# Patient Record
Sex: MALE | Race: OTHER | HISPANIC OR LATINO | Employment: PART TIME | URBAN - METROPOLITAN AREA
[De-identification: names, ages, dates, MRNs, and addresses within clinical notes are randomized per-mention and may not be internally consistent; named-entity substitution may affect disease eponyms.]

---

## 2022-07-29 ENCOUNTER — HOSPITAL ENCOUNTER (OUTPATIENT)
Facility: HOSPITAL | Age: 32
Discharge: HOME | End: 2022-08-02
Attending: STUDENT IN AN ORGANIZED HEALTH CARE EDUCATION/TRAINING PROGRAM | Admitting: STUDENT IN AN ORGANIZED HEALTH CARE EDUCATION/TRAINING PROGRAM

## 2022-07-29 DIAGNOSIS — R07.9 CHEST PAIN: ICD-10-CM

## 2022-07-29 DIAGNOSIS — L03.319 CELLULITIS OF SUPRAPUBIC REGION: Primary | ICD-10-CM

## 2022-07-29 DIAGNOSIS — R06.02 SOB (SHORTNESS OF BREATH): ICD-10-CM

## 2022-07-29 DIAGNOSIS — E66.2 OBESITY HYPOVENTILATION SYNDROME: ICD-10-CM

## 2022-07-29 DIAGNOSIS — J96.02 ACUTE HYPERCAPNIC RESPIRATORY FAILURE: ICD-10-CM

## 2022-07-29 LAB
ALBUMIN SERPL-MCNC: 3.1 GM/DL (ref 3.5–5)
ALBUMIN/GLOB SERPL: 0.9 RATIO (ref 1.1–2)
ALP SERPL-CCNC: 108 UNIT/L (ref 40–150)
ALT SERPL-CCNC: 50 UNIT/L (ref 0–55)
APPEARANCE UR: CLEAR
AST SERPL-CCNC: 27 UNIT/L (ref 5–34)
BACTERIA #/AREA URNS AUTO: ABNORMAL /HPF
BASOPHILS # BLD AUTO: 0.03 X10(3)/MCL (ref 0–0.2)
BASOPHILS NFR BLD AUTO: 0.4 %
BILIRUB UR QL STRIP.AUTO: NEGATIVE MG/DL
BILIRUBIN DIRECT+TOT PNL SERPL-MCNC: 0.8 MG/DL
BUN SERPL-MCNC: 20.5 MG/DL (ref 8.9–20.6)
CALCIUM SERPL-MCNC: 8.6 MG/DL (ref 8.4–10.2)
CHLORIDE SERPL-SCNC: 100 MMOL/L (ref 98–107)
CO2 SERPL-SCNC: 35 MMOL/L (ref 22–29)
COLOR UR AUTO: ABNORMAL
CREAT SERPL-MCNC: 0.9 MG/DL (ref 0.73–1.18)
CRP SERPL-MCNC: 13.3 MG/L
EOSINOPHIL # BLD AUTO: 0.12 X10(3)/MCL (ref 0–0.9)
EOSINOPHIL NFR BLD AUTO: 1.5 %
ERYTHROCYTE [DISTWIDTH] IN BLOOD BY AUTOMATED COUNT: 16.2 % (ref 11.5–17)
GLOBULIN SER-MCNC: 3.6 GM/DL (ref 2.4–3.5)
GLUCOSE SERPL-MCNC: 86 MG/DL (ref 74–100)
GLUCOSE UR QL STRIP.AUTO: NORMAL MG/DL
HCT VFR BLD AUTO: 54.4 % (ref 42–52)
HGB BLD-MCNC: 15.6 GM/DL (ref 14–18)
HYALINE CASTS #/AREA URNS LPF: ABNORMAL /LPF
IMM GRANULOCYTES # BLD AUTO: 0.02 X10(3)/MCL (ref 0–0.04)
IMM GRANULOCYTES NFR BLD AUTO: 0.3 %
KETONES UR QL STRIP.AUTO: NEGATIVE MG/DL
LACTATE SERPL-SCNC: 1.4 MMOL/L (ref 0.5–2.2)
LEUKOCYTE ESTERASE UR QL STRIP.AUTO: NEGATIVE UNIT/L
LYMPHOCYTES # BLD AUTO: 1.23 X10(3)/MCL (ref 0.6–4.6)
LYMPHOCYTES NFR BLD AUTO: 15.9 %
MCH RBC QN AUTO: 26.4 PG (ref 27–31)
MCHC RBC AUTO-ENTMCNC: 28.7 MG/DL (ref 33–36)
MCV RBC AUTO: 91.9 FL (ref 80–94)
MONOCYTES # BLD AUTO: 1.1 X10(3)/MCL (ref 0.1–1.3)
MONOCYTES NFR BLD AUTO: 14.2 %
NEUTROPHILS # BLD AUTO: 5.3 X10(3)/MCL (ref 2.1–9.2)
NEUTROPHILS NFR BLD AUTO: 67.7 %
NITRITE UR QL STRIP.AUTO: NEGATIVE
NRBC BLD AUTO-RTO: 0.5 %
PH UR STRIP.AUTO: 6 [PH]
PLATELET # BLD AUTO: 228 X10(3)/MCL (ref 130–400)
PMV BLD AUTO: 10.5 FL (ref 7.4–10.4)
POTASSIUM SERPL-SCNC: 4.4 MMOL/L (ref 3.5–5.1)
PROT SERPL-MCNC: 6.7 GM/DL (ref 6.4–8.3)
PROT UR QL STRIP.AUTO: ABNORMAL MG/DL
RBC # BLD AUTO: 5.92 X10(6)/MCL (ref 4.7–6.1)
RBC #/AREA URNS AUTO: ABNORMAL /HPF
RBC UR QL AUTO: NEGATIVE UNIT/L
SODIUM SERPL-SCNC: 142 MMOL/L (ref 136–145)
SP GR UR STRIP.AUTO: 1.03
SQUAMOUS #/AREA URNS LPF: ABNORMAL /HPF
UROBILINOGEN UR STRIP-ACNC: NORMAL MG/DL
WBC # SPEC AUTO: 7.8 X10(3)/MCL (ref 4.5–11.5)
WBC #/AREA URNS AUTO: ABNORMAL /HPF

## 2022-07-29 PROCEDURE — 25500020 PHARM REV CODE 255

## 2022-07-29 PROCEDURE — 87591 N.GONORRHOEAE DNA AMP PROB: CPT | Performed by: PHYSICIAN ASSISTANT

## 2022-07-29 PROCEDURE — 36415 COLL VENOUS BLD VENIPUNCTURE: CPT | Performed by: PHYSICIAN ASSISTANT

## 2022-07-29 PROCEDURE — 96365 THER/PROPH/DIAG IV INF INIT: CPT

## 2022-07-29 PROCEDURE — 80053 COMPREHEN METABOLIC PANEL: CPT | Performed by: PHYSICIAN ASSISTANT

## 2022-07-29 PROCEDURE — 99285 EMERGENCY DEPT VISIT HI MDM: CPT | Mod: 25

## 2022-07-29 PROCEDURE — 25000003 PHARM REV CODE 250: Performed by: PHYSICIAN ASSISTANT

## 2022-07-29 PROCEDURE — 83605 ASSAY OF LACTIC ACID: CPT | Performed by: PHYSICIAN ASSISTANT

## 2022-07-29 PROCEDURE — 81001 URINALYSIS AUTO W/SCOPE: CPT | Performed by: PHYSICIAN ASSISTANT

## 2022-07-29 PROCEDURE — 87040 BLOOD CULTURE FOR BACTERIA: CPT | Performed by: PHYSICIAN ASSISTANT

## 2022-07-29 PROCEDURE — 63600175 PHARM REV CODE 636 W HCPCS: Performed by: PHYSICIAN ASSISTANT

## 2022-07-29 PROCEDURE — 96361 HYDRATE IV INFUSION ADD-ON: CPT

## 2022-07-29 PROCEDURE — 87491 CHLMYD TRACH DNA AMP PROBE: CPT | Performed by: PHYSICIAN ASSISTANT

## 2022-07-29 PROCEDURE — 85025 COMPLETE CBC W/AUTO DIFF WBC: CPT | Performed by: PHYSICIAN ASSISTANT

## 2022-07-29 PROCEDURE — 86140 C-REACTIVE PROTEIN: CPT | Performed by: PHYSICIAN ASSISTANT

## 2022-07-29 RX ORDER — CLINDAMYCIN PHOSPHATE 600 MG/50ML
600 INJECTION, SOLUTION INTRAVENOUS
Status: COMPLETED | OUTPATIENT
Start: 2022-07-29 | End: 2022-07-29

## 2022-07-29 RX ADMIN — IOPAMIDOL 100 ML: 755 INJECTION, SOLUTION INTRAVENOUS at 09:07

## 2022-07-29 RX ADMIN — CLINDAMYCIN PHOSPHATE 600 MG: 600 INJECTION, SOLUTION INTRAVENOUS at 11:07

## 2022-07-29 RX ADMIN — SODIUM CHLORIDE, POTASSIUM CHLORIDE, SODIUM LACTATE AND CALCIUM CHLORIDE 1000 ML: 600; 310; 30; 20 INJECTION, SOLUTION INTRAVENOUS at 09:07

## 2022-07-30 LAB
ALBUMIN SERPL-MCNC: 3 GM/DL (ref 3.5–5)
ALBUMIN/GLOB SERPL: 0.8 RATIO (ref 1.1–2)
ALP SERPL-CCNC: 111 UNIT/L (ref 40–150)
ALT SERPL-CCNC: 51 UNIT/L (ref 0–55)
AMPHET UR QL SCN: NEGATIVE
AST SERPL-CCNC: 24 UNIT/L (ref 5–34)
BARBITURATE SCN PRESENT UR: NEGATIVE
BASOPHILS # BLD AUTO: 0.02 X10(3)/MCL (ref 0–0.2)
BASOPHILS NFR BLD AUTO: 0.3 %
BENZODIAZ UR QL SCN: NEGATIVE
BILIRUBIN DIRECT+TOT PNL SERPL-MCNC: 0.5 MG/DL
BNP BLD-MCNC: 378.4 PG/ML
BUN SERPL-MCNC: 18.9 MG/DL (ref 8.9–20.6)
C TRACH DNA SPEC QL NAA+PROBE: NOT DETECTED
CALCIUM SERPL-MCNC: 8.4 MG/DL (ref 8.4–10.2)
CANNABINOIDS UR QL SCN: NEGATIVE
CHLORIDE SERPL-SCNC: 102 MMOL/L (ref 98–107)
CHOLEST SERPL-MCNC: 83 MG/DL
CHOLEST/HDLC SERPL: 6 {RATIO} (ref 0–5)
CO2 SERPL-SCNC: 32 MMOL/L (ref 22–29)
COCAINE UR QL SCN: NEGATIVE
CORRECTED TEMPERATURE (PCO2): 80 MMHG (ref 20–50)
CORRECTED TEMPERATURE (PCO2): 85 MMHG (ref 20–50)
CORRECTED TEMPERATURE (PH): 7.23 (ref 7.3–7.6)
CORRECTED TEMPERATURE (PH): 7.26 (ref 7.3–7.6)
CORRECTED TEMPERATURE (PO2): 246 MMHG (ref 75–100)
CORRECTED TEMPERATURE (PO2): 67 MMHG (ref 75–100)
CREAT SERPL-MCNC: 0.75 MG/DL (ref 0.73–1.18)
D DIMER PPP IA.FEU-MCNC: 1.25 UG/ML FEU (ref 0–0.5)
EOSINOPHIL # BLD AUTO: 0.13 X10(3)/MCL (ref 0–0.9)
EOSINOPHIL NFR BLD AUTO: 1.9 %
ERYTHROCYTE [DISTWIDTH] IN BLOOD BY AUTOMATED COUNT: 16.3 % (ref 11.5–17)
ERYTHROCYTE [SEDIMENTATION RATE] IN BLOOD: 10 MM/HR (ref 0–15)
EST. AVERAGE GLUCOSE BLD GHB EST-MCNC: 142.7 MG/DL
FENTANYL UR QL SCN: NEGATIVE
GLOBULIN SER-MCNC: 3.6 GM/DL (ref 2.4–3.5)
GLUCOSE SERPL-MCNC: 102 MG/DL (ref 74–100)
HBA1C MFR BLD: 6.6 %
HCO3 UR-SCNC: 35.6 MMOL/L (ref 22–26)
HCO3 UR-SCNC: 35.9 MMOL/L (ref 22–26)
HCT VFR BLD AUTO: 54.8 % (ref 42–52)
HDLC SERPL-MCNC: 15 MG/DL (ref 35–60)
HGB BLD-MCNC: 15.7 G/DL (ref 12–18)
HGB BLD-MCNC: 16 G/DL (ref 12–18)
HGB BLD-MCNC: 16.1 GM/DL (ref 14–18)
HIV 1+2 AB+HIV1 P24 AG SERPL QL IA: NONREACTIVE
IMM GRANULOCYTES # BLD AUTO: 0.02 X10(3)/MCL (ref 0–0.04)
IMM GRANULOCYTES NFR BLD AUTO: 0.3 %
LDLC SERPL CALC-MCNC: 47 MG/DL (ref 50–140)
LYMPHOCYTES # BLD AUTO: 1.05 X10(3)/MCL (ref 0.6–4.6)
LYMPHOCYTES NFR BLD AUTO: 15.1 %
MAGNESIUM SERPL-MCNC: 1.6 MG/DL (ref 1.6–2.6)
MCH RBC QN AUTO: 27 PG (ref 27–31)
MCHC RBC AUTO-ENTMCNC: 29.4 MG/DL (ref 33–36)
MCV RBC AUTO: 91.9 FL (ref 80–94)
MDMA UR QL SCN: NEGATIVE
MONOCYTES # BLD AUTO: 0.92 X10(3)/MCL (ref 0.1–1.3)
MONOCYTES NFR BLD AUTO: 13.2 %
N GONORRHOEA DNA SPEC QL NAA+PROBE: NOT DETECTED
NEUTROPHILS # BLD AUTO: 4.8 X10(3)/MCL (ref 2.1–9.2)
NEUTROPHILS NFR BLD AUTO: 69.2 %
NRBC BLD AUTO-RTO: 0.3 %
OPIATES UR QL SCN: NEGATIVE
PCO2 BLDA: 80 MMHG (ref 20–50)
PCO2 BLDA: 85 MMHG (ref 20–50)
PCP UR QL: NEGATIVE
PH SMN: 7.23 [PH] (ref 7.3–7.6)
PH SMN: 7.26 [PH] (ref 7.3–7.6)
PH UR: 5.5 [PH] (ref 3–11)
PHOSPHATE SERPL-MCNC: 3.9 MG/DL (ref 2.3–4.7)
PLATELET # BLD AUTO: 220 X10(3)/MCL (ref 130–400)
PMV BLD AUTO: 10.9 FL (ref 7.4–10.4)
PO2 BLDA: 246 MMHG (ref 75–100)
PO2 BLDA: 67 MMHG (ref 75–100)
POC BASE DEFICIT: 4.4 MMOL/L (ref -2–2)
POC BASE DEFICIT: 5.3 MMOL/L (ref -2–2)
POC COHB: 1.6 % (ref 0.5–1.5)
POC COHB: 2 % (ref 0.5–1.5)
POC METHB: 0.5 % (ref 0–1.5)
POC METHB: 0.7 % (ref 0–1.5)
POC O2HB: 88.5 % (ref 94–100)
POC O2HB: 96.7 % (ref 94–100)
POC PERFORMED BY: ABNORMAL
POC PERFORMED BY: ABNORMAL
POC SATURATED O2: 89.8 % (ref 90–100)
POC SATURATED O2: 99.8 % (ref 90–100)
POC TEMPERATURE: 37 C
POC TEMPERATURE: 37 C
POCT GLUCOSE: 111 MG/DL (ref 70–110)
POCT GLUCOSE: 115 MG/DL (ref 70–110)
POCT GLUCOSE: 120 MG/DL (ref 70–110)
POTASSIUM SERPL-SCNC: 4.5 MMOL/L (ref 3.5–5.1)
PROT SERPL-MCNC: 6.6 GM/DL (ref 6.4–8.3)
RBC # BLD AUTO: 5.96 X10(6)/MCL (ref 4.7–6.1)
SARS-COV-2 RDRP RESP QL NAA+PROBE: NEGATIVE
SODIUM SERPL-SCNC: 141 MMOL/L (ref 136–145)
SPECIFIC GRAVITY, URINE AUTO (.000) (OHS): 1.02 (ref 1–1.03)
SPECIMEN SOURCE: ABNORMAL
SPECIMEN SOURCE: ABNORMAL
T PALLIDUM AB SER QL: NONREACTIVE
TRIGL SERPL-MCNC: 103 MG/DL (ref 34–140)
TSH SERPL-ACNC: 3.27 UIU/ML (ref 0.35–4.94)
VANCOMYCIN SERPL-MCNC: 15.6 UG/ML (ref 15–20)
VANCOMYCIN TROUGH SERPL-MCNC: <1.4 UG/ML (ref 15–20)
VLDLC SERPL CALC-MCNC: 21 MG/DL
WBC # SPEC AUTO: 7 X10(3)/MCL (ref 4.5–11.5)

## 2022-07-30 PROCEDURE — 83036 HEMOGLOBIN GLYCOSYLATED A1C: CPT

## 2022-07-30 PROCEDURE — 80202 ASSAY OF VANCOMYCIN: CPT | Performed by: STUDENT IN AN ORGANIZED HEALTH CARE EDUCATION/TRAINING PROGRAM

## 2022-07-30 PROCEDURE — G0378 HOSPITAL OBSERVATION PER HR: HCPCS

## 2022-07-30 PROCEDURE — 80061 LIPID PANEL: CPT

## 2022-07-30 PROCEDURE — 86780 TREPONEMA PALLIDUM: CPT

## 2022-07-30 PROCEDURE — 87635 SARS-COV-2 COVID-19 AMP PRB: CPT | Performed by: PHYSICIAN ASSISTANT

## 2022-07-30 PROCEDURE — 27000190 HC CPAP FULL FACE MASK W/VALVE

## 2022-07-30 PROCEDURE — 80307 DRUG TEST PRSMV CHEM ANLYZR: CPT

## 2022-07-30 PROCEDURE — 87389 HIV-1 AG W/HIV-1&-2 AB AG IA: CPT

## 2022-07-30 PROCEDURE — 83735 ASSAY OF MAGNESIUM: CPT

## 2022-07-30 PROCEDURE — 96368 THER/DIAG CONCURRENT INF: CPT

## 2022-07-30 PROCEDURE — 80202 ASSAY OF VANCOMYCIN: CPT

## 2022-07-30 PROCEDURE — 99900035 HC TECH TIME PER 15 MIN (STAT)

## 2022-07-30 PROCEDURE — 82803 BLOOD GASES ANY COMBINATION: CPT

## 2022-07-30 PROCEDURE — 96366 THER/PROPH/DIAG IV INF ADDON: CPT

## 2022-07-30 PROCEDURE — 84443 ASSAY THYROID STIM HORMONE: CPT

## 2022-07-30 PROCEDURE — 63600175 PHARM REV CODE 636 W HCPCS

## 2022-07-30 PROCEDURE — 25500020 PHARM REV CODE 255

## 2022-07-30 PROCEDURE — 36415 COLL VENOUS BLD VENIPUNCTURE: CPT

## 2022-07-30 PROCEDURE — 25000003 PHARM REV CODE 250

## 2022-07-30 PROCEDURE — 94660 CPAP INITIATION&MGMT: CPT

## 2022-07-30 PROCEDURE — 36600 WITHDRAWAL OF ARTERIAL BLOOD: CPT

## 2022-07-30 PROCEDURE — 63600175 PHARM REV CODE 636 W HCPCS: Performed by: STUDENT IN AN ORGANIZED HEALTH CARE EDUCATION/TRAINING PROGRAM

## 2022-07-30 PROCEDURE — 25000003 PHARM REV CODE 250: Performed by: STUDENT IN AN ORGANIZED HEALTH CARE EDUCATION/TRAINING PROGRAM

## 2022-07-30 PROCEDURE — 83880 ASSAY OF NATRIURETIC PEPTIDE: CPT

## 2022-07-30 PROCEDURE — 27000221 HC OXYGEN, UP TO 24 HOURS

## 2022-07-30 PROCEDURE — 93005 ELECTROCARDIOGRAM TRACING: CPT

## 2022-07-30 PROCEDURE — 85025 COMPLETE CBC W/AUTO DIFF WBC: CPT

## 2022-07-30 PROCEDURE — 96367 TX/PROPH/DG ADDL SEQ IV INF: CPT

## 2022-07-30 PROCEDURE — 85651 RBC SED RATE NONAUTOMATED: CPT

## 2022-07-30 PROCEDURE — 96372 THER/PROPH/DIAG INJ SC/IM: CPT

## 2022-07-30 PROCEDURE — 94761 N-INVAS EAR/PLS OXIMETRY MLT: CPT

## 2022-07-30 PROCEDURE — 84100 ASSAY OF PHOSPHORUS: CPT

## 2022-07-30 PROCEDURE — 85379 FIBRIN DEGRADATION QUANT: CPT

## 2022-07-30 PROCEDURE — 80053 COMPREHEN METABOLIC PANEL: CPT

## 2022-07-30 RX ORDER — VANCOMYCIN 1.75 GRAM/500 ML IN 0.9 % SODIUM CHLORIDE INTRAVENOUS
1750
Status: DISCONTINUED | OUTPATIENT
Start: 2022-07-30 | End: 2022-07-31

## 2022-07-30 RX ORDER — GLUCAGON 1 MG
1 KIT INJECTION
Status: DISCONTINUED | OUTPATIENT
Start: 2022-07-30 | End: 2022-07-30

## 2022-07-30 RX ORDER — IBUPROFEN 200 MG
24 TABLET ORAL
Status: DISCONTINUED | OUTPATIENT
Start: 2022-07-30 | End: 2022-08-02 | Stop reason: HOSPADM

## 2022-07-30 RX ORDER — ENOXAPARIN SODIUM 60 MG/.6ML
1 INJECTION SUBCUTANEOUS
Status: DISCONTINUED | OUTPATIENT
Start: 2022-07-30 | End: 2022-07-30

## 2022-07-30 RX ORDER — IBUPROFEN 200 MG
16 TABLET ORAL
Status: DISCONTINUED | OUTPATIENT
Start: 2022-07-30 | End: 2022-07-30

## 2022-07-30 RX ORDER — SODIUM CHLORIDE 0.9 % (FLUSH) 0.9 %
10 SYRINGE (ML) INJECTION EVERY 12 HOURS PRN
Status: DISCONTINUED | OUTPATIENT
Start: 2022-07-30 | End: 2022-08-02 | Stop reason: HOSPADM

## 2022-07-30 RX ORDER — INSULIN ASPART 100 [IU]/ML
0-5 INJECTION, SOLUTION INTRAVENOUS; SUBCUTANEOUS
Status: DISCONTINUED | OUTPATIENT
Start: 2022-07-30 | End: 2022-08-02 | Stop reason: HOSPADM

## 2022-07-30 RX ORDER — PANTOPRAZOLE SODIUM 40 MG/1
40 TABLET, DELAYED RELEASE ORAL DAILY
Status: DISCONTINUED | OUTPATIENT
Start: 2022-07-30 | End: 2022-08-02 | Stop reason: HOSPADM

## 2022-07-30 RX ORDER — IBUPROFEN 200 MG
16 TABLET ORAL
Status: DISCONTINUED | OUTPATIENT
Start: 2022-07-30 | End: 2022-08-02 | Stop reason: HOSPADM

## 2022-07-30 RX ORDER — GLUCAGON 1 MG
1 KIT INJECTION
Status: DISCONTINUED | OUTPATIENT
Start: 2022-07-30 | End: 2022-08-02 | Stop reason: HOSPADM

## 2022-07-30 RX ORDER — ENOXAPARIN SODIUM 100 MG/ML
40 INJECTION SUBCUTANEOUS EVERY 12 HOURS
Status: DISCONTINUED | OUTPATIENT
Start: 2022-07-30 | End: 2022-07-30

## 2022-07-30 RX ORDER — IBUPROFEN 200 MG
24 TABLET ORAL
Status: DISCONTINUED | OUTPATIENT
Start: 2022-07-30 | End: 2022-07-30

## 2022-07-30 RX ORDER — ENOXAPARIN SODIUM 150 MG/ML
150 INJECTION SUBCUTANEOUS
Status: DISCONTINUED | OUTPATIENT
Start: 2022-07-30 | End: 2022-07-31

## 2022-07-30 RX ORDER — LEVOFLOXACIN 5 MG/ML
750 INJECTION, SOLUTION INTRAVENOUS
Status: DISCONTINUED | OUTPATIENT
Start: 2022-07-30 | End: 2022-08-01

## 2022-07-30 RX ORDER — MAGNESIUM SULFATE HEPTAHYDRATE 40 MG/ML
2 INJECTION, SOLUTION INTRAVENOUS ONCE
Status: COMPLETED | OUTPATIENT
Start: 2022-07-30 | End: 2022-07-30

## 2022-07-30 RX ORDER — NALOXONE HCL 0.4 MG/ML
0.02 VIAL (ML) INJECTION
Status: DISCONTINUED | OUTPATIENT
Start: 2022-07-30 | End: 2022-08-02 | Stop reason: HOSPADM

## 2022-07-30 RX ADMIN — MAGNESIUM SULFATE 2 G: 2 INJECTION INTRAVENOUS at 04:07

## 2022-07-30 RX ADMIN — IOPAMIDOL 100 ML: 755 INJECTION, SOLUTION INTRAVENOUS at 11:07

## 2022-07-30 RX ADMIN — VANCOMYCIN HYDROCHLORIDE 2000 MG: 1 INJECTION, POWDER, LYOPHILIZED, FOR SOLUTION INTRAVENOUS at 02:07

## 2022-07-30 RX ADMIN — VANCOMYCIN 1.75 GRAM/500 ML IN 0.9 % SODIUM CHLORIDE INTRAVENOUS 1750 MG: at 01:07

## 2022-07-30 RX ADMIN — VANCOMYCIN 1.75 GRAM/500 ML IN 0.9 % SODIUM CHLORIDE INTRAVENOUS 1750 MG: at 10:07

## 2022-07-30 RX ADMIN — ENOXAPARIN SODIUM 150 MG: 150 INJECTION SUBCUTANEOUS at 07:07

## 2022-07-30 RX ADMIN — LEVOFLOXACIN 750 MG: 750 INJECTION, SOLUTION INTRAVENOUS at 03:07

## 2022-07-30 RX ADMIN — PANTOPRAZOLE SODIUM 40 MG: 40 TABLET, DELAYED RELEASE ORAL at 09:07

## 2022-07-30 RX ADMIN — ENOXAPARIN SODIUM 150 MG: 150 INJECTION SUBCUTANEOUS at 06:07

## 2022-07-30 NOTE — PT/OT/SLP PROGRESS
Physical Therapy      Patient Name:  Tawanda Izaguirre   MRN:  [id number]    Patient not seen today secondary to Testing/imaging (xray/CT/MRI), Other (Comment) (Pt awaiting CTE - PE protocol test/results, will re-attempt eval when results available and pt appropriate.  Will check pt status tomorrow.). Will follow-up 7/31/22.

## 2022-07-30 NOTE — PLAN OF CARE
Problem: Adult Inpatient Plan of Care  Goal: Plan of Care Review  Outcome: Ongoing, Progressing  Goal: Patient-Specific Goal (Individualized)  Outcome: Ongoing, Progressing  Goal: Absence of Hospital-Acquired Illness or Injury  Outcome: Ongoing, Progressing  Goal: Optimal Comfort and Wellbeing  Outcome: Ongoing, Progressing  Goal: Readiness for Transition of Care  Outcome: Ongoing, Progressing     Problem: Bariatric Environmental Safety  Goal: Safety Maintained with Care  Outcome: Ongoing, Progressing     Problem: Infection  Goal: Absence of Infection Signs and Symptoms  Outcome: Ongoing, Progressing     Problem: Pain Acute  Goal: Acceptable Pain Control and Functional Ability  Outcome: Ongoing, Progressing

## 2022-07-30 NOTE — ED PROVIDER NOTES
Encounter Date: 7/29/2022       History     Chief Complaint   Patient presents with    Dysuria    Pelvic Pain     Pt arrived to ED after going to urgent care. Pt brought papers from urgent care stating he needs IV abx for possible pelvic cellulitis. Pt does require . Pt states his penis is swollen and red x2 days. Pt states he has had trouble urinating and painful urination since this AM. Pt states pain 4/10     Patient is a 32 year old [place]z speaking male who was sent to ED from urgent care for IV antibiotics for possible pelvic cellulitis of skin.  He states it burns whenever he urinates and he thinks his penis is infected due to swelling and redness x 4 days.  He denies fever, chills, nausea, vomiting.  He also states that his doctor mentioned that he needs oxygen.      The history is provided by the patient. A  was used.     Review of patient's allergies indicates:  No Known Allergies  History reviewed. No pertinent past medical history.  History reviewed. No pertinent surgical history.  History reviewed. No pertinent family history.     Review of Systems   Constitutional: Negative for chills and fever.   Respiratory: Negative for cough and chest tightness.    Cardiovascular: Negative for chest pain and palpitations.   Gastrointestinal: Positive for abdominal pain (lower abdomen/suprapubic swelling, pain and redness consistent with cellulitis ). Negative for nausea and vomiting.   Genitourinary: Positive for dysuria and penile pain (swollen, red).   Neurological: Negative for dizziness, numbness and headaches.   Hematological: Negative.        Physical Exam     Initial Vitals [07/29/22 1955]   BP Pulse Resp Temp SpO2   136/83 86 18 99.1 °F (37.3 °C) 95 %      MAP       --         Physical Exam    Nursing note and vitals reviewed.  Constitutional: He is Obese .   HENT:   Nose: Nose normal.   Mouth/Throat: Oropharynx is clear and moist.   Eyes: Conjunctivae are normal.    Cardiovascular: Normal rate, normal heart sounds and intact distal pulses.   Pulmonary/Chest: Breath sounds normal.   Abdominal: Abdomen is soft. Bowel sounds are normal.   Cellulitis in lower abdomen, suprapubic area into bi groin.  Tenderness, erythema, swelling     Neurological: He is alert.   Skin: Skin is warm. Capillary refill takes less than 2 seconds.         ED Course   Procedures  Labs Reviewed   COMPREHENSIVE METABOLIC PANEL - Abnormal; Notable for the following components:       Result Value    Carbon Dioxide 35 (*)     Albumin Level 3.1 (*)     Globulin 3.6 (*)     Albumin/Globulin Ratio 0.9 (*)     All other components within normal limits   C-REACTIVE PROTEIN - Abnormal; Notable for the following components:    C-Reactive Protein 13.30 (*)     All other components within normal limits   URINALYSIS, REFLEX TO URINE CULTURE - Abnormal; Notable for the following components:    Color, UA Light-Yellow (*)     Protein, UA Trace (*)     All other components within normal limits   CBC WITH DIFFERENTIAL - Abnormal; Notable for the following components:    Hct 54.4 (*)     MCH 26.4 (*)     MCHC 28.7 (*)     MPV 10.5 (*)     All other components within normal limits   LACTIC ACID, PLASMA - Normal   CHLAMYDIA/GONORRHOEAE(GC), PCR   BLOOD CULTURE [facility]   BLOOD CULTURE [facility]   CBC W/ AUTO DIFFERENTIAL    Narrative:     The following orders were created for panel order CBC Auto Differential.  Procedure                               Abnormality         Status                     ---------                               -----------         ------                     CBC with Differential[[id number]]        Abnormal            Final result                 Please view results for these tests on the individual orders.   EXTRA TUBES    Narrative:     The following orders were created for panel order EXTRA TUBES.  Procedure                               Abnormality         Status                     ---------                                -----------         ------                     Light Blue Top Hold[[id number]]                              In process                 Red Top Hold[[id number]]                                     In process                   Please view results for these tests on the individual orders.   LIGHT BLUE TOP HOLD   RED TOP HOLD   EXTRA TUBES    Narrative:     The following orders were created for panel order EXTRA TUBES.  Procedure                               Abnormality         Status                     ---------                               -----------         ------                     Light Green Top Hold[[id number]]                             In process                   Please view results for these tests on the individual orders.   LIGHT GREEN TOP HOLD   SARS-COV-2 RNA AMPLIFICATION, QUAL          Imaging Results          CT Abdomen Pelvis With Contrast (Preliminary result)  Result time 07/29/22 21:46:46    Preliminary result by Interface, Rad Results In (07/29/22 21:46:46)                 Narrative:    START OF REPORT:  Technique: CT of the abdomen and pelvis was performed with axial images as well as sagittal and coronal reconstruction images with intravenous contrast but without oral contrast.    Comparison: None available.    Clinical History: Cellulitis, lower abdomen, genital area.    Dosage Information: Automated Exposure Control was utilized 1436.38 mGy.cm.    Findings:  Lines and Tubes: None.  Thorax:  Lungs: The visualized lung bases appear unremarkable.  Pleura: No effusions or thickening.  Heart: The heart size is within normal limits.  Abdomen:  Abdominal Wall: There is moderate diffuse subcutaneous fat stranding along the ventral mid and lower abdominal wall with associated mild cutaneous thickening. This may reflect cellulitis. No discrete fluid collection or abscess is identified.  Liver: The liver appears unremarkable.  Biliary System: No extrahepatic biliary duct dilatation is  seen.  Gallbladder: The gallbladder is non-distended and appears otherwise unremarkable.  Pancreas: The pancreas appears unremarkable.  Spleen: The spleen appears unremarkable.  Adrenals: The adrenal glands appear unremarkable.  Kidneys: The kidneys appear unremarkable with no stones cysts masses or hydronephrosis.  Aorta: The abdominal aorta appears unremarkable.  IVC: Unremarkable.  Bowel:  Esophagus: The visualized esophagus appears unremarkable.  Stomach: The stomach appears unremarkable.  Duodenum: Unremarkable appearing duodenum.  Small Bowel: The small bowel appears unremarkable.  Colon: Nondistended.  Appendix: The appendix appears unremarkable (series 2 image 61-71).  Peritoneum: No intraperitoneal free air or ascites is seen.    Pelvis:  Bladder: The bladder appears unremarkable.  Male:  Prostate gland: The prostate gland appears unremarkable.  Inguinal Findings: Multiple prominent inguinal lymph nodes are seen bilaterally. This is consistent with reactive lymphadenopathy.  Inguinal Hernia: Incidental note is made of small uncomplicated mesenteric fat containing right inguinal hernia.    Bony structures:  Dorsal Spine: The visualized dorsal spine appears unremarkable.      Impression:  1. There is moderate diffuse subcutaneous fat stranding along the ventral mid and lower abdominal wall with associated mild cutaneous thickening. This may reflect cellulitis. No discrete fluid collection or abscess is identified.  2. Multiple prominent inguinal lymph nodes are seen bilaterally. This is consistent with reactive lymphadenopathy.  3. No acute intraabdominal or pelvic solid organ or bowel pathology identified. Details and other findings as discussed above.                      Preliminary result by Drew Calabrese MD (07/29/22 21:46:46)                 Narrative:    START OF REPORT:  Technique: CT of the abdomen and pelvis was performed with axial images as well as sagittal and coronal reconstruction images with  intravenous contrast but without oral contrast.    Comparison: None available.    Clinical History: Cellulitis, lower abdomen, genital area.    Dosage Information: Automated Exposure Control was utilized 1436.38 mGy.cm.    Findings:  Lines and Tubes: None.  Thorax:  Lungs: The visualized lung bases appear unremarkable.  Pleura: No effusions or thickening.  Heart: The heart size is within normal limits.  Abdomen:  Abdominal Wall: There is moderate diffuse subcutaneous fat stranding along the ventral mid and lower abdominal wall with associated mild cutaneous thickening. This may reflect cellulitis. No discrete fluid collection or abscess is identified.  Liver: The liver appears unremarkable.  Biliary System: No extrahepatic biliary duct dilatation is seen.  Gallbladder: The gallbladder is non-distended and appears otherwise unremarkable.  Pancreas: The pancreas appears unremarkable.  Spleen: The spleen appears unremarkable.  Adrenals: The adrenal glands appear unremarkable.  Kidneys: The kidneys appear unremarkable with no stones cysts masses or hydronephrosis.  Aorta: The abdominal aorta appears unremarkable.  IVC: Unremarkable.  Bowel:  Esophagus: The visualized esophagus appears unremarkable.  Stomach: The stomach appears unremarkable.  Duodenum: Unremarkable appearing duodenum.  Small Bowel: The small bowel appears unremarkable.  Colon: Nondistended.  Appendix: The appendix appears unremarkable (series 2 image 61-71).  Peritoneum: No intraperitoneal free air or ascites is seen.    Pelvis:  Bladder: The bladder appears unremarkable.  Male:  Prostate gland: The prostate gland appears unremarkable.  Inguinal Findings: Multiple prominent inguinal lymph nodes are seen bilaterally. This is consistent with reactive lymphadenopathy.  Inguinal Hernia: Incidental note is made of small uncomplicated mesenteric fat containing right inguinal hernia.    Bony structures:  Dorsal Spine: The visualized dorsal spine appears  unremarkable.      Impression:  1. There is moderate diffuse subcutaneous fat stranding along the ventral mid and lower abdominal wall with associated mild cutaneous thickening. This may reflect cellulitis. No discrete fluid collection or abscess is identified.  2. Multiple prominent inguinal lymph nodes are seen bilaterally. This is consistent with reactive lymphadenopathy.  3. No acute intraabdominal or pelvic solid organ or bowel pathology identified. Details and other findings as discussed above.                                   Medications   lactated ringers bolus 1,000 mL (0 mLs Intravenous Stopped 7/29/22 2204)   iopamidoL (ISOVUE-370) 76 % injection (100 mLs Intravenous Given 7/29/22 2115)   clindamycin in D5W 600 mg/50 mL IVPB 600 mg (600 mg Intravenous New Bag 7/29/22 2300)     Medical Decision Making:   Clinical Tests:   Lab Tests: Ordered and Reviewed  Radiological Study: Reviewed and Ordered  Other:   I have discussed this case with another health care provider.       <> Summary of the Discussion: I consulted Dr. Gipson for a bed for face to face evaluation of patient prior to admission             ED Course as of 07/30/22 0055 Fri Jul 29, 2022 2130 CRP(!): 13.30 [ER]   2130 CO2(!): 35 [ER]   2254 CT Abdomen Pelvis With Contrast  1. There is moderate diffuse subcutaneous fat stranding along the ventral mid and lower abdominal wall with associated mild cutaneous thickening. This may reflect cellulitis. No discrete fluid collection or abscess is identified.  2. Multiple prominent inguinal lymph nodes are seen bilaterally. This is consistent with reactive lymphadenopathy.  3. No acute intraabdominal or pelvic solid organ or bowel pathology identified. Details and other findings as discussed above.    [ER]   2333 Lactate, Damon: 1.4 [ER]      ED Course User Index  [ER] LIZETT Cochran             Clinical Impression:   Final diagnoses:  [L03.319] Cellulitis of suprapubic region (Primary)          ED  Disposition Condition    Admit               LIZETT Cochran  07/30/22 0057

## 2022-07-30 NOTE — H&P
"[facility] Medicine Wards   History & Physical Note     Resident Team: [facility] Medicine List 2  Attending Physician: Juan Miller MD  Resident: Raul Ramirez MD   Intern: Carley Zelaya MD     Date of Admit: 7/29/2022    Chief Complaint:     Dysuria and Pelvic Pain (Pt arrived to ED after going to urgent care. Pt brought papers from urgent care stating he needs IV abx for possible pelvic cellulitis. Pt does require . Pt states his penis is swollen and red x2 days. Pt states he has had trouble urinating and painful urination since this AM. Pt states pain 4/10)      Subjective:      History of Present Illness:  Tawanda Izaguirre is a 32 y.o. male who with no known PMH who presented to [facility] ED on 7/29/2022  with complaint of pelvic pain and dysuria.  Pt states he started to notice small blisters around his groin region aprox. 1 week ago.  The blisters started to spread up his abdomin and he began to notice swelling and redness about 4 days ago.  Over the course of that time he states the rash has continued to worsen and now feels very hot and swollen. He rates the pain as a 5/10 and denies any alleviating or aggravating factors. 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 The swelling has also been associated with problems urinating.  Pt states he has pain with urination and that he feels like "the urine isn't coming out as easily".  He states he has not been sexually active in 6 months and denies any penile discharge or lesions on his genital region. He denies any trauma to this region and states he has not been swimming recently.  He denies any F/C, N/V, chest pain, diarrhea, constipation, hematuria, abdominal pain, or lightheadedness.      Upon arrival to the ED, pt appeared to be short of breath and appeared to be satting in the 50s-60s on room air.  He was placed on 2L NC and his O2 sat " increased slightly.  He was then placed on BiPAP.  He has no history of oxygen requirements and does not use supplemental oxygen at home.  He is Covid negative. An ABG was ordered in the ED which showed evidence of chronic CO2 retention and respiratory acidosis consistent with obesity hypoventilation syndrome.     Pt states he has not been to the doctor since he was 11 years old.  He denies any past medical history and is not currently taking any medications.  Labs in the ED showed a normal WBC of 7.8 and the patient was afebrile on presentation. He had a stable H/H of 15.6/54.4 and his electrolytes were within normal limits.  His CRP was mildly elevated at 13.3 and UA was unremarkable.       Past Medical History:   has no past medical history on file.     Past Surgical History:   has no past surgical history on file.     Family History:  family history is not on file.     Social History:    Pt denies any history of cigerette use or use of tobacco products.  He states he used to drink beer a few days a week but quit drinking about 6 years ago.  He denies any other recreational drug use.     Allergies:  has No Known Allergies.     Home Medications:  Prior to Admission medications    Not on File     Review of Systems   Constitutional: Negative for chills and fever.   HENT: Negative for sore throat.    Eyes: Negative for blurred vision and double vision.   Respiratory: Positive for shortness of breath. Negative for cough and wheezing.    Cardiovascular: Positive for leg swelling. Negative for chest pain.   Gastrointestinal: Negative for abdominal pain, constipation, diarrhea, nausea and vomiting.   Genitourinary: Positive for dysuria and frequency. Negative for flank pain.   Musculoskeletal: Negative.    Skin: Positive for rash.   Neurological: Negative for dizziness, loss of consciousness, weakness and headaches.   Endo/Heme/Allergies: Negative.    Psychiatric/Behavioral: Negative.         Objective:     Vital Signs  (Most Recent):  Temp: 97.6 °F (36.4 °C) (07/30/22 0235)  Pulse: 66 (07/30/22 0235)  Resp: 20 (07/30/22 0235)  BP: 116/81 (07/30/22 0235)  SpO2: 100 % (07/30/22 0235) Vital Signs (24h Range):  Temp:  [97.6 °F (36.4 °C)-99.1 °F (37.3 °C)] 97.6 °F (36.4 °C)  Pulse:  [66-91] 66  Resp:  [18-20] 20  SpO2:  [52 %-100 %] 100 %  BP: (116-137)/(61-84) 116/81     Physical Exam  Vitals reviewed.   Constitutional:       General: He is not in acute distress.     Appearance: Normal appearance.   HENT:      Head: Normocephalic and atraumatic.      Mouth/Throat:      Mouth: Mucous membranes are moist.      Pharynx: Oropharynx is clear.   Eyes:      Extraocular Movements: Extraocular movements intact.      Pupils: Pupils are equal, round, and reactive to light.   Cardiovascular:      Rate and Rhythm: Normal rate and regular rhythm.      Pulses: Normal pulses.      Heart sounds: Normal heart sounds.   Pulmonary:      Effort: Pulmonary effort is normal. No respiratory distress.      Breath sounds: Normal breath sounds. No wheezing or rhonchi.   Abdominal:      General: Bowel sounds are normal.   Musculoskeletal:         General: Normal range of motion.      Cervical back: Normal range of motion and neck supple.   Skin:     Findings: Rash present.      Comments: Red, swollen, erythematous rash appearing on lower abdomin and groin region.     Neurological:      General: No focal deficit present.      Mental Status: He is alert and oriented to person, place, and time.   Psychiatric:         Mood and Affect: Mood normal.         Behavior: Behavior normal.                 Laboratory:  Most Recent Data:  Recent Lab Results  (Last 5 results in the past 24 hours)      07/30/22  0201   07/30/22  0153   07/30/22  0150   07/30/22  0046   07/30/22  0026        Base Deficit       5.3  Comment: Result is outside patient normal (reference) range.         Performed By:       JONAH         Correct Temperature (PH)       7.26  Comment: Result is outside  panic range (critical limits).         Corrected Temperature (pCO2)       80  Comment: Result is outside panic range (critical limits).         Corrected Temperature (pO2)       67  Comment: Result is outside patient normal (reference) range.         POC COHb       2.0  Comment: Result is outside patient normal (reference) range.         POC MetHb       0.50         POC O2Hb       88.5  Comment: Result is outside patient normal (reference) range.         Specimen source       Arterial sample         Albumin/Globulin Ratio   0.8             Albumin   3.0  Comment: Fasting             Alkaline Phosphatase   111  Comment: Fasting             ALT   51  Comment: Fasting             AST   24  Comment: Fasting             Baso #   0.02             Basophil %   0.3             BILIRUBIN TOTAL   0.5  Comment: Fasting             BUN   18.9  Comment: Fasting             Calcium   8.4  Comment: Fasting             Chloride   102  Comment: Fasting             Cholesterol   83  Comment: Fasting             CO2   32  Comment: Fasting             ID NOW COVID-19, (NURIA)         Negative       Creatinine   0.75  Comment: Fasting             eGFR if non    >60             Eos #   0.13             Eosinophil %   1.9             Estimated Avg Glucose   142.7             Globulin, Total   3.6             Glucose   102  Comment: Fasting             HDL   15  Comment: Fasting             Hematocrit   54.8             Hemoglobin   16.1             Hemoglobin A1C External   6.6             HIV   Nonreactive             Immature Grans (Abs)   0.02             Immature Granulocytes   0.3             LDL Cholesterol External   47.00             Lymph #   1.05             LYMPH %   15.1             Magnesium 1.60               MCH   27.0             MCHC   29.4             MCV   91.9             Mono #   0.92             Mono %   13.2             MPV   10.9             Neut #   4.8             Neut %   69.2             nRBC    0.3             Phosphorus 3.9               Platelets   220             POC HCO3       35.9  Comment: Result is outside patient normal (reference) range.         POC HEMOGLOBIN       16.0         POC PCO2       80  Comment: Result is outside panic range (critical limits).         POC PH       7.26  Comment: Result is outside panic range (critical limits).         POC PO2       67  Comment: Result is outside patient normal (reference) range.         POC SATURATED O2       89.8  Comment: Result is outside patient normal (reference) range.         POC Temp       37.0         Potassium   4.5  Comment: Fasting             PROTEIN TOTAL   6.6  Comment: Fasting             RBC   5.96             RDW   16.3             Sed Rate   10             Sodium   141  Comment: Fasting             Syphilis Antibody   Nonreactive             Thyroid Stimulating Hormone   3.2731  Comment: Fasting             Total Cholesterol/HDL Ratio   6             Triglycerides   103  Comment: Fasting             Vancomycin-Trough     <1.4           Very Low Density Lipoprotein   21             WBC   7.0                                    Microbiology Data:  Blood cultures pending   Resp cultures ordered w/ gram stain  Vanc and Levoquin Day 1    Other Results:  EKG (my interpretation): normal EKG, normal sinus rhythm, low voltage read.     Radiology:  Imaging Results          X-Ray Chest 1 View (In process)                CT Abdomen Pelvis With Contrast (Preliminary result)  Result time 07/29/22 21:46:46    Preliminary result by Interface, Rad Results In (07/29/22 21:46:46)                 Narrative:    START OF REPORT:  Technique: CT of the abdomen and pelvis was performed with axial images as well as sagittal and coronal reconstruction images with intravenous contrast but without oral contrast.    Comparison: None available.    Clinical History: Cellulitis, lower abdomen, genital area.    Dosage Information: Automated Exposure Control was  utilized 1436.38 mGy.cm.    Findings:  Lines and Tubes: None.  Thorax:  Lungs: The visualized lung bases appear unremarkable.  Pleura: No effusions or thickening.  Heart: The heart size is within normal limits.  Abdomen:  Abdominal Wall: There is moderate diffuse subcutaneous fat stranding along the ventral mid and lower abdominal wall with associated mild cutaneous thickening. This may reflect cellulitis. No discrete fluid collection or abscess is identified.  Liver: The liver appears unremarkable.  Biliary System: No extrahepatic biliary duct dilatation is seen.  Gallbladder: The gallbladder is non-distended and appears otherwise unremarkable.  Pancreas: The pancreas appears unremarkable.  Spleen: The spleen appears unremarkable.  Adrenals: The adrenal glands appear unremarkable.  Kidneys: The kidneys appear unremarkable with no stones cysts masses or hydronephrosis.  Aorta: The abdominal aorta appears unremarkable.  IVC: Unremarkable.  Bowel:  Esophagus: The visualized esophagus appears unremarkable.  Stomach: The stomach appears unremarkable.  Duodenum: Unremarkable appearing duodenum.  Small Bowel: The small bowel appears unremarkable.  Colon: Nondistended.  Appendix: The appendix appears unremarkable (series 2 image [id number]).  Peritoneum: No intraperitoneal free air or ascites is seen.    Pelvis:  Bladder: The bladder appears unremarkable.  Male:  Prostate gland: The prostate gland appears unremarkable.  Inguinal Findings: Multiple prominent inguinal lymph nodes are seen bilaterally. This is consistent with reactive lymphadenopathy.  Inguinal Hernia: Incidental note is made of small uncomplicated mesenteric fat containing right inguinal hernia.    Bony structures:  Dorsal Spine: The visualized dorsal spine appears unremarkable.      Impression:  1. There is moderate diffuse subcutaneous fat stranding along the ventral mid and lower abdominal wall with associated mild cutaneous thickening. This may reflect  cellulitis. No discrete fluid collection or abscess is identified.  2. Multiple prominent inguinal lymph nodes are seen bilaterally. This is consistent with reactive lymphadenopathy.  3. No acute intraabdominal or pelvic solid organ or bowel pathology identified. Details and other findings as discussed above.                      Preliminary result by Drew Calabrese MD (07/29/22 21:46:46)                 Narrative:    START OF REPORT:  Technique: CT of the abdomen and pelvis was performed with axial images as well as sagittal and coronal reconstruction images with intravenous contrast but without oral contrast.    Comparison: None available.    Clinical History: Cellulitis, lower abdomen, genital area.    Dosage Information: Automated Exposure Control was utilized 1436.38 mGy.cm.    Findings:  Lines and Tubes: None.  Thorax:  Lungs: The visualized lung bases appear unremarkable.  Pleura: No effusions or thickening.  Heart: The heart size is within normal limits.  Abdomen:  Abdominal Wall: There is moderate diffuse subcutaneous fat stranding along the ventral mid and lower abdominal wall with associated mild cutaneous thickening. This may reflect cellulitis. No discrete fluid collection or abscess is identified.  Liver: The liver appears unremarkable.  Biliary System: No extrahepatic biliary duct dilatation is seen.  Gallbladder: The gallbladder is non-distended and appears otherwise unremarkable.  Pancreas: The pancreas appears unremarkable.  Spleen: The spleen appears unremarkable.  Adrenals: The adrenal glands appear unremarkable.  Kidneys: The kidneys appear unremarkable with no stones cysts masses or hydronephrosis.  Aorta: The abdominal aorta appears unremarkable.  IVC: Unremarkable.  Bowel:  Esophagus: The visualized esophagus appears unremarkable.  Stomach: The stomach appears unremarkable.  Duodenum: Unremarkable appearing duodenum.  Small Bowel: The small bowel appears unremarkable.  Colon:  Nondistended.  Appendix: The appendix appears unremarkable (series 2 image 61-71).  Peritoneum: No intraperitoneal free air or ascites is seen.    Pelvis:  Bladder: The bladder appears unremarkable.  Male:  Prostate gland: The prostate gland appears unremarkable.  Inguinal Findings: Multiple prominent inguinal lymph nodes are seen bilaterally. This is consistent with reactive lymphadenopathy.  Inguinal Hernia: Incidental note is made of small uncomplicated mesenteric fat containing right inguinal hernia.    Bony structures:  Dorsal Spine: The visualized dorsal spine appears unremarkable.      Impression:  1. There is moderate diffuse subcutaneous fat stranding along the ventral mid and lower abdominal wall with associated mild cutaneous thickening. This may reflect cellulitis. No discrete fluid collection or abscess is identified.  2. Multiple prominent inguinal lymph nodes are seen bilaterally. This is consistent with reactive lymphadenopathy.  3. No acute intraabdominal or pelvic solid organ or bowel pathology identified. Details and other findings as discussed above.                                     Lines/Drains/Airways     Peripheral Intravenous Line  Duration                Peripheral IV - Single Lumen 07/29/22 2100 18 G Right Antecubital <1 day                 Assessment & Plan:     Cellulitis   Lower abdominal and suprapubic region   -WBC wnl   -afebrile on presentation   -ESR 10, CRP 13.3   -blood culture x2 ordered    -Urinalysis unremarkable   -negative for G/C   -HIV and syphilis negative  -started on vancomycin     Respiratory Acidosis   Obesity Hypoventilation Syndrome  -Pt presented in ED saturating in 50s-60s on room air   -placed on 2LNC with minimal improvement   -ABG ordered in ED shows pH 7.26, CO2 80, and O2 67  -placed on BiPAP   -CO2 retention likely due to morbid obesity with BMI 77  -D-dimer ordered   -BNP ordered  -EKG ordered   -maintain head of bed at 30 degrees  -plan for echo Monday      Community-acquired pneumonia   -bilateral infiltrates present on chest x-ray with left-sided pleural effusion and wide mediastinum  -sputum respiratory cultures and gram stain ordered   -started levoquin 750mg   -possible CT Chest pending D-dimer     T2DM   -A1C 6.6   -LDSS ordered   -will monitor sugars     CODE STATUS: Full   Lines: PIV  Antibiotics: Vancomycin, Levaquin   Diet: Diabetic diet   Fluids: none   Oxygenation: BiPAP  DVT Prophylaxis: Lovenox   GI Prophylaxis: Protonix     Dispo: Pt admitted to telemetry for observation of respiratory status, IV antibiotics, and further workup.  Pt currently on BiPAP.     Carley Zelaya MD  [facility], -1

## 2022-07-31 LAB
ALBUMIN SERPL-MCNC: 3 GM/DL (ref 3.5–5)
ALBUMIN/GLOB SERPL: 0.8 RATIO (ref 1.1–2)
ALP SERPL-CCNC: 93 UNIT/L (ref 40–150)
ALT SERPL-CCNC: 42 UNIT/L (ref 0–55)
AST SERPL-CCNC: 20 UNIT/L (ref 5–34)
BASOPHILS # BLD AUTO: 0.03 X10(3)/MCL (ref 0–0.2)
BASOPHILS NFR BLD AUTO: 0.4 %
BILIRUBIN DIRECT+TOT PNL SERPL-MCNC: 0.5 MG/DL
BUN SERPL-MCNC: 11.2 MG/DL (ref 8.9–20.6)
CALCIUM SERPL-MCNC: 8.7 MG/DL (ref 8.4–10.2)
CHLORIDE SERPL-SCNC: 101 MMOL/L (ref 98–107)
CO2 SERPL-SCNC: 36 MMOL/L (ref 22–29)
CREAT SERPL-MCNC: 0.73 MG/DL (ref 0.73–1.18)
EOSINOPHIL # BLD AUTO: 0.12 X10(3)/MCL (ref 0–0.9)
EOSINOPHIL NFR BLD AUTO: 1.4 %
ERYTHROCYTE [DISTWIDTH] IN BLOOD BY AUTOMATED COUNT: 16.1 % (ref 11.5–17)
GLOBULIN SER-MCNC: 3.7 GM/DL (ref 2.4–3.5)
GLUCOSE SERPL-MCNC: 95 MG/DL (ref 74–100)
HCT VFR BLD AUTO: 54.7 % (ref 42–52)
HGB BLD-MCNC: 15.2 GM/DL (ref 14–18)
IMM GRANULOCYTES # BLD AUTO: 0.02 X10(3)/MCL (ref 0–0.04)
IMM GRANULOCYTES NFR BLD AUTO: 0.2 %
LYMPHOCYTES # BLD AUTO: 1.32 X10(3)/MCL (ref 0.6–4.6)
LYMPHOCYTES NFR BLD AUTO: 15.6 %
MAGNESIUM SERPL-MCNC: 1.6 MG/DL (ref 1.6–2.6)
MCH RBC QN AUTO: 26.3 PG (ref 27–31)
MCHC RBC AUTO-ENTMCNC: 27.8 MG/DL (ref 33–36)
MCV RBC AUTO: 94.5 FL (ref 80–94)
MONOCYTES # BLD AUTO: 1.4 X10(3)/MCL (ref 0.1–1.3)
MONOCYTES NFR BLD AUTO: 16.6 %
NEUTROPHILS # BLD AUTO: 5.6 X10(3)/MCL (ref 2.1–9.2)
NEUTROPHILS NFR BLD AUTO: 65.8 %
NRBC BLD AUTO-RTO: 0.2 %
PHOSPHATE SERPL-MCNC: 4.2 MG/DL (ref 2.3–4.7)
PLATELET # BLD AUTO: 210 X10(3)/MCL (ref 130–400)
PMV BLD AUTO: 11 FL (ref 7.4–10.4)
POCT GLUCOSE: 121 MG/DL (ref 70–110)
POCT GLUCOSE: 127 MG/DL (ref 70–110)
POCT GLUCOSE: 95 MG/DL (ref 70–110)
POCT GLUCOSE: 96 MG/DL (ref 70–110)
POTASSIUM SERPL-SCNC: 4.9 MMOL/L (ref 3.5–5.1)
PROT SERPL-MCNC: 6.7 GM/DL (ref 6.4–8.3)
RBC # BLD AUTO: 5.79 X10(6)/MCL (ref 4.7–6.1)
SODIUM SERPL-SCNC: 144 MMOL/L (ref 136–145)
VANCOMYCIN SERPL-MCNC: 20.7 UG/ML (ref 15–20)
VANCOMYCIN TROUGH SERPL-MCNC: 21.4 UG/ML (ref 15–20)
WBC # SPEC AUTO: 8.4 X10(3)/MCL (ref 4.5–11.5)

## 2022-07-31 PROCEDURE — 96366 THER/PROPH/DIAG IV INF ADDON: CPT

## 2022-07-31 PROCEDURE — 85025 COMPLETE CBC W/AUTO DIFF WBC: CPT

## 2022-07-31 PROCEDURE — 80202 ASSAY OF VANCOMYCIN: CPT | Performed by: STUDENT IN AN ORGANIZED HEALTH CARE EDUCATION/TRAINING PROGRAM

## 2022-07-31 PROCEDURE — 25000003 PHARM REV CODE 250

## 2022-07-31 PROCEDURE — G0378 HOSPITAL OBSERVATION PER HR: HCPCS

## 2022-07-31 PROCEDURE — 63600175 PHARM REV CODE 636 W HCPCS

## 2022-07-31 PROCEDURE — 80053 COMPREHEN METABOLIC PANEL: CPT

## 2022-07-31 PROCEDURE — 80202 ASSAY OF VANCOMYCIN: CPT

## 2022-07-31 PROCEDURE — 97162 PT EVAL MOD COMPLEX 30 MIN: CPT

## 2022-07-31 PROCEDURE — 36415 COLL VENOUS BLD VENIPUNCTURE: CPT

## 2022-07-31 PROCEDURE — 83735 ASSAY OF MAGNESIUM: CPT

## 2022-07-31 PROCEDURE — 84100 ASSAY OF PHOSPHORUS: CPT

## 2022-07-31 PROCEDURE — 25000003 PHARM REV CODE 250: Performed by: STUDENT IN AN ORGANIZED HEALTH CARE EDUCATION/TRAINING PROGRAM

## 2022-07-31 PROCEDURE — 96372 THER/PROPH/DIAG INJ SC/IM: CPT

## 2022-07-31 RX ORDER — VANCOMYCIN 1.75 GRAM/500 ML IN 0.9 % SODIUM CHLORIDE INTRAVENOUS
1750
Status: DISCONTINUED | OUTPATIENT
Start: 2022-07-31 | End: 2022-07-31

## 2022-07-31 RX ORDER — DOXYLAMINE SUCCINATE 25 MG
TABLET ORAL 2 TIMES DAILY
Status: DISCONTINUED | OUTPATIENT
Start: 2022-07-31 | End: 2022-08-01

## 2022-07-31 RX ORDER — ENOXAPARIN SODIUM 100 MG/ML
40 INJECTION SUBCUTANEOUS
Status: DISCONTINUED | OUTPATIENT
Start: 2022-08-01 | End: 2022-08-02 | Stop reason: HOSPADM

## 2022-07-31 RX ADMIN — VANCOMYCIN 1.75 GRAM/500 ML IN 0.9 % SODIUM CHLORIDE INTRAVENOUS 1750 MG: at 10:07

## 2022-07-31 RX ADMIN — ENOXAPARIN SODIUM 150 MG: 150 INJECTION SUBCUTANEOUS at 06:07

## 2022-07-31 RX ADMIN — MICONAZOLE NITRATE: 20 CREAM TOPICAL at 12:07

## 2022-07-31 RX ADMIN — PANTOPRAZOLE SODIUM 40 MG: 40 TABLET, DELAYED RELEASE ORAL at 08:07

## 2022-07-31 RX ADMIN — MICONAZOLE NITRATE: 20 CREAM TOPICAL at 08:07

## 2022-07-31 RX ADMIN — LEVOFLOXACIN 750 MG: 750 INJECTION, SOLUTION INTRAVENOUS at 02:07

## 2022-07-31 NOTE — CARE UPDATE
Received CN for trilogy, attempted to notify team that patient has no payor source, pending response.

## 2022-07-31 NOTE — PT/OT/SLP EVAL
Physical Therapy Evaluation    Patient Name:  Tawanda Izaguirre   MRN:  [id number]    Recommendations:     Discharge Recommendations:  home   Discharge Equipment Recommendations: none   Barriers to discharge: None    Assessment:     Tawanda Izaguirre is a 32 y.o. male admitted with a medical diagnosis of Cellulitis of Suprapubic Region.  He presents with the following impairments/functional limitations:  impaired endurance, impaired mobility .    Rehab Prognosis: Good; patient would benefit from acute skilled PT services to address these deficits and reach maximum level of function.    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  Equipment used at home: none.  DME owned (not currently used): none.  Upon discharge, patient will have assistance from his brother.    Objective:     Communicated with nurse prior to session.  Patient found supine with CPAP, peripheral IV  upon PT entry to room.    General Precautions: Standard, fall   Orthopedic Precautions:    Braces:    Respiratory Status: Room air except for CPAP for sleeping per nurse.    Exams:  · Cognitive Exam:  Patient is oriented to Person, Place, Time and Situation  · Fine Motor Coordination:    · -       Intact  · Gross Motor Coordination:  WFL  · Sensation:    · -       Intact  · RUE ROM: WFL  · RUE Strength: WFL  · LUE ROM: WFL  · LUE Strength: WFL  · RLE ROM:  WFL except as limited by obesity  · RLE Strength: WFL  · LLE ROM: WFL except as limited by obesity  · LLE Strength: WFL    Functional Mobility:  · Bed Mobility:     · Supine to Sit: supervision  · Sit to Supine: supervision  · Transfers:     · Sit to Stand:  supervision with no AD  · Gait: 130' with no AD with CGA on Room air, oxygen level after amb 88%  With return to 93% within 1 min, all on room air    Therapeutic Activities and Exercises:      AM-PAC 6 CLICK MOBILITY  Total Score:20     Patient left supine with all lines intact and call button in reach.    GOALS:   Multidisciplinary Problems     Physical Therapy Goals        Problem: Physical Therapy    Goal Priority Disciplines Outcome Goal Variances Interventions   Physical Therapy Goal     PT, PT/OT      Description: Goals to be met by: 22    Patient will increase functional independence with mobility by performin. Supine to sit with [place]  2. Sit to supine with [place]  3. Sit to stand transfer with [place]  4. Gait  x 260 feet with [place] using No Assistive Device.                      History:     History reviewed. No pertinent past medical history.    History reviewed. No pertinent surgical history.    Time Tracking:     PT Received On: 22  PT Start Time: 1040     PT Stop Time: 1140  PT Total Time (min): 60 min     Billable Minutes: Evaluation 60      2022

## 2022-07-31 NOTE — PROGRESS NOTES
[facility] Medicine Wards Progress Note     Resident Team: [facility] Medicine List 2  Attending Physician: Juan Milelr MD    Subjective:      Brief HPI:  32-year-old  male presented to [facility] ED with complaints of suprapubic rash that started about 4 days ago.  Patient states that he initially noticed a pimple in the suprapubic region 4 days ago and believes that he popped them when he was wearing a belt while at work. He work in construction.  Patient states that there is nothing that makes it better or anything that makes it worse.  Patient has not tried anything to alleviate his symptoms.  Patient also reports shortness of breath that he notices more on exertion.  Patient denies fever, chills, lightheadedness, dizziness, chest pain, PND, abdominal pain nausea, vomiting, dysuria, hematuria, hematochezia    Interval History: No acute events overnight.  #[id number] was used for duration of interview. Difficult to arouse this morning requiring sternal rub. C/o continued pain over suprapubic area and having difficulty breathing with BiPAP mask in place. Denies n/v, fevers, chills, SOB, chest pain, dysuria.    Review of Systems:  ROS completed and negative except as indicated above.     Objective:     Last 24 Hour Vital Signs:  BP  Min: 103/70  Max: 143/85  Temp  Av.5 °F (36.4 °C)  Min: 96.8 °F (36 °C)  Max: 97.9 °F (36.6 °C)  Pulse  Av.2  Min: 73  Max: 82  Resp  Av.7  Min: 20  Max: 22  SpO2  Av %  Min: 81 %  Max: 96 %  I/O last 3 completed shifts:  In: 1739 [P.O.:740; IV Piggyback:999]  Out: 600 [Urine:600]    Physical Examination:  General: alert and oriented, no acute distress  HEENT: normocephalic, atraumatic.   Respiratory: CTAB. No wheezing, crackles, rales, rhonchi. Non-labored breathing  Cardiovascular: RRR, no murmurs, rubs, or gallops. No peripheral edema. No JVD.  Gastrointestinal: soft, non-tender, non-distended. Normal bowel sounds.  Musculoskeletal: moves all extremities  purposefully  Integumentary: suprapubic erythema and induration. Moist beneath pannus with areas of peeling skin.    Laboratory:  Most Recent Data:  CBC:   Lab Results   Component Value Date    WBC 8.4 07/31/2022    HGB 15.2 07/31/2022    HCT 54.7 (H) 07/31/2022     07/31/2022    MCV 94.5 (H) 07/31/2022    RDW 16.1 07/31/2022     WBC Differential:   Recent Labs   Lab 07/29/22  2045 07/30/22  0153 07/31/22  0505   WBC 7.8 7.0 8.4   HGB 15.6 16.1 15.2   HCT 54.4* 54.8* 54.7*    220 210   MCV 91.9 91.9 94.5*     BMP:   Lab Results   Component Value Date     07/31/2022    K 4.9 07/31/2022    CO2 36 (H) 07/31/2022    BUN 11.2 07/31/2022    CREATININE 0.73 07/31/2022    CALCIUM 8.7 07/31/2022    MG 1.60 07/31/2022    PHOS 4.2 07/31/2022     LFTs:   Lab Results   Component Value Date    ALBUMIN 3.0 (L) 07/31/2022    BILITOT 0.5 07/31/2022    AST 20 07/31/2022    ALKPHOS 93 07/31/2022    ALT 42 07/31/2022     Coags: No results found for: INR, PROTIME, PTT  FLP:   Lab Results   Component Value Date    CHOL 83 07/30/2022    HDL 15 (L) 07/30/2022    TRIG 103 07/30/2022     DM:   Lab Results   Component Value Date    HGBA1C 6.6 07/30/2022    CREATININE 0.73 07/31/2022     Thyroid:   Lab Results   Component Value Date    TSH 3.2731 07/30/2022      Anemia: No results found for: IRON, TIBC, FERRITIN, SATURATEDIRO  No results found for: [id number]  No results found for: FOLATE     Cardiac:   Lab Results   Component Value Date    .4 (H) 07/30/2022         Microbiology Data:  Microbiology Results (last 7 days)     Procedure Component Value Units Date/Time    Blood Culture [[id number]]  (Normal) Collected: 07/29/22 2239    Order Status: Completed Specimen: Blood Updated: 07/31/22 1002     CULTURE, BLOOD ([facility]) No Growth At 24 Hours    Blood Culture [[id number]]  (Normal) Collected: 07/29/22 2255    Order Status: Completed Specimen: Blood Updated: 07/31/22 1002     CULTURE, BLOOD ([facility]) No Growth At 24 Hours     Respiratory Culture [[id number]]     Order Status: Sent Specimen: Sputum     Gram Stain [[id number]]     Order Status: Sent Specimen: Sputum     Chlamydia/GC, PCR [[id number]]  (Normal) Collected: 07/29/22 2220    Order Status: Completed Specimen: Urine Updated: 07/30/22 0056     Chlamydia trachomatis PCR Not Detected     N. gonorrhea PCR Not Detected           Other Results:    Radiology:  Imaging Results          X-Ray Chest 1 View (Final result)  Result time 07/30/22 08:10:42    Final result by Jorge Holliday MD (07/30/22 08:10:42)                 Impression:      Mild cardiomegaly.    Increase in interstitial and pulmonary vascular markings indicating a degree of pulmonary vascular congestion      Electronically signed by: Jorge Holliday  Date:    07/30/2022  Time:    08:10             Narrative:    EXAMINATION:  XR CHEST 1 VIEW    CPT 92192    CLINICAL HISTORY:  shortness of breath;    FINDINGS:  Mediastinal silhouette to be within normal limits cardiac silhouette is enlarged there is some increase in interstitial and pulmonary vascular markings which may indicate a degree of pulmonary vascular congestion and cardiac decompensation.    No definite focal consolidative changes                               CT Abdomen Pelvis With Contrast (Final result)  Result time 07/30/22 07:45:12    Final result by Layo Sexton MD (07/30/22 07:45:12)                 Impression:      1. Subcutaneous edema along the flanks with suspected cellulitis of the lower anterior abdominal wall.  2. Borderline enlarged inguinal and external iliac lymph nodes, likely reactive change.  3. Mild hepatomegaly with trace ascites.  No major discrepancy with the preliminary radiology report.      Electronically signed by: Layo Sexton  Date:    07/30/2022  Time:    07:45             Narrative:    EXAMINATION:  CT ABDOMEN PELVIS WITH CONTRAST    CLINICAL HISTORY:  cellulitis, lower abdomen, genital area;    TECHNIQUE:  CT imaging of the  abdomen and pelvis after the administration of intravenous contrast. Dose length product is 1436 mGycm. Automatic exposure control, adjustment of mA/kV or iterative reconstruction technique used to limit radiation dose.    COMPARISON:  No relevant comparison studies available at the time of dictation.    FINDINGS:  Liver/biliary: Liver mildly enlarged.  No biliary dilatation.    Pancreas: Normal.    Spleen: Normal.    Adrenals: Normal.    Genitourinary: Symmetric renal enhancement. No hydronephrosis. Bladder under distended with no definitive abnormality.    Stomach/bowel: No evidence of bowel obstruction. Normal appendix. No definitive sites of bowel inflammation.    Lymph nodes: Borderline enlarged bilateral inguinal and external iliac lymph nodes.    Peritoneum: Trace ascites.    Vasculature: Normal abdominal aortic caliber.  Patent SMV, splenic vein and main portal vein.    Abdominal wall: Mild to moderate subcutaneous edema along the flanks.  Areas of mild skin thickening in the lower anterior abdominal wall.    Lung bases: No consolidation or pleural effusion.    Musculoskeletal: No acute osseous findings.                    Preliminary result by [employer], Rad Results In (07/29/22 21:46:46)                 Narrative:    START OF REPORT:  Technique: CT of the abdomen and pelvis was performed with axial images as well as sagittal and coronal reconstruction images with intravenous contrast but without oral contrast.    Comparison: None available.    Clinical History: Cellulitis, lower abdomen, genital area.    Dosage Information: Automated Exposure Control was utilized 1436.38 mGy.cm.    Findings:  Lines and Tubes: None.  Thorax:  Lungs: The visualized lung bases appear unremarkable.  Pleura: No effusions or thickening.  Heart: The heart size is within normal limits.  Abdomen:  Abdominal Wall: There is moderate diffuse subcutaneous fat stranding along the ventral mid and lower abdominal wall with associated mild  cutaneous thickening. This may reflect cellulitis. No discrete fluid collection or abscess is identified.  Liver: The liver appears unremarkable.  Biliary System: No extrahepatic biliary duct dilatation is seen.  Gallbladder: The gallbladder is non-distended and appears otherwise unremarkable.  Pancreas: The pancreas appears unremarkable.  Spleen: The spleen appears unremarkable.  Adrenals: The adrenal glands appear unremarkable.  Kidneys: The kidneys appear unremarkable with no stones cysts masses or hydronephrosis.  Aorta: The abdominal aorta appears unremarkable.  IVC: Unremarkable.  Bowel:  Esophagus: The visualized esophagus appears unremarkable.  Stomach: The stomach appears unremarkable.  Duodenum: Unremarkable appearing duodenum.  Small Bowel: The small bowel appears unremarkable.  Colon: Nondistended.  Appendix: The appendix appears unremarkable (series 2 image [id number]).  Peritoneum: No intraperitoneal free air or ascites is seen.    Pelvis:  Bladder: The bladder appears unremarkable.  Male:  Prostate gland: The prostate gland appears unremarkable.  Inguinal Findings: Multiple prominent inguinal lymph nodes are seen bilaterally. This is consistent with reactive lymphadenopathy.  Inguinal Hernia: Incidental note is made of small uncomplicated mesenteric fat containing right inguinal hernia.    Bony structures:  Dorsal Spine: The visualized dorsal spine appears unremarkable.      Impression:  1. There is moderate diffuse subcutaneous fat stranding along the ventral mid and lower abdominal wall with associated mild cutaneous thickening. This may reflect cellulitis. No discrete fluid collection or abscess is identified.  2. Multiple prominent inguinal lymph nodes are seen bilaterally. This is consistent with reactive lymphadenopathy.  3. No acute intraabdominal or pelvic solid organ or bowel pathology identified. Details and other findings as discussed above.                      Preliminary result by Layo AUGUST  MD Darshan (07/29/22 21:46:46)                 Narrative:    START OF REPORT:  Technique: CT of the abdomen and pelvis was performed with axial images as well as sagittal and coronal reconstruction images with intravenous contrast but without oral contrast.    Comparison: None available.    Clinical History: Cellulitis, lower abdomen, genital area.    Dosage Information: Automated Exposure Control was utilized 1436.38 mGy.cm.    Findings:  Lines and Tubes: None.  Thorax:  Lungs: The visualized lung bases appear unremarkable.  Pleura: No effusions or thickening.  Heart: The heart size is within normal limits.  Abdomen:  Abdominal Wall: There is moderate diffuse subcutaneous fat stranding along the ventral mid and lower abdominal wall with associated mild cutaneous thickening. This may reflect cellulitis. No discrete fluid collection or abscess is identified.  Liver: The liver appears unremarkable.  Biliary System: No extrahepatic biliary duct dilatation is seen.  Gallbladder: The gallbladder is non-distended and appears otherwise unremarkable.  Pancreas: The pancreas appears unremarkable.  Spleen: The spleen appears unremarkable.  Adrenals: The adrenal glands appear unremarkable.  Kidneys: The kidneys appear unremarkable with no stones cysts masses or hydronephrosis.  Aorta: The abdominal aorta appears unremarkable.  IVC: Unremarkable.  Bowel:  Esophagus: The visualized esophagus appears unremarkable.  Stomach: The stomach appears unremarkable.  Duodenum: Unremarkable appearing duodenum.  Small Bowel: The small bowel appears unremarkable.  Colon: Nondistended.  Appendix: The appendix appears unremarkable (series 2 image 61-71).  Peritoneum: No intraperitoneal free air or ascites is seen.    Pelvis:  Bladder: The bladder appears unremarkable.  Male:  Prostate gland: The prostate gland appears unremarkable.  Inguinal Findings: Multiple prominent inguinal lymph nodes are seen bilaterally. This is consistent with  reactive lymphadenopathy.  Inguinal Hernia: Incidental note is made of small uncomplicated mesenteric fat containing right inguinal hernia.    Bony structures:  Dorsal Spine: The visualized dorsal spine appears unremarkable.      Impression:  1. There is moderate diffuse subcutaneous fat stranding along the ventral mid and lower abdominal wall with associated mild cutaneous thickening. This may reflect cellulitis. No discrete fluid collection or abscess is identified.  2. Multiple prominent inguinal lymph nodes are seen bilaterally. This is consistent with reactive lymphadenopathy.  3. No acute intraabdominal or pelvic solid organ or bowel pathology identified. Details and other findings as discussed above.                                  Current Medications:     Infusions:       Scheduled:   [START ON 8/1/2022] enoxaparin  40 mg Subcutaneous Q12H    levoFLOXacin  750 mg Intravenous Q24H    miconazole   Topical (Top) BID    pantoprazole  40 mg Oral Daily    vancomycin in 0.9 % sodium chl  1,750 mg Intravenous Q8H        PRN:  dextrose 10%, dextrose 10%, glucagon (human recombinant), glucose, glucose, insulin aspart U-100, naloxone, sodium chloride 0.9%    Antibiotics and Day Number of Therapy:  Vanc and Levaquin (7/30) day 2    Assessment & Plan:     Suprapubic cellulitis  - Suprapubic erythema, mildly tender to touch  - CRP 13, ESR and WBC wnl  - CTAP findings consistent with cellulitis   - Afebrile on presentation. Blood Cx x 2 with NGTD @24h  - Continue Vancomycin and levofloxacin (started 7/30) day 2  - Poss fungal involvement, topical micanazole 2%     Respiratory acidosis 2/2 Obesity hypoventilation syndrome  - BMI 77  - In ED, O2 sats dropped into 50s while he was sleeping  - ABG pH is 7.26, pCO2 80, PO2 67  - Patient was placed on a BiPAP to be worn anytime patient is sleeping during day or night  - Please avoid sedative medications to prevent further inhibition of respiratory drive  - Pt will need  Trilogy outpatient, will work with      Community-acquired pneumonia  - Bilateral infiltrates present on a chest x-ray with left-sided pleural effusion. Heart border difficult to appreciate on CXR  - Sputum Cx has not been collected  - Started patient on Levaquin 750 mg  - CTA PE was ordered     Elevated D-dimer  - D-dimer of 1.25  - Patient is on full-dose lovenox. Transition to prophylactic dose if CTA PE negative.  - CTA PE negative for PE. Multiple enlarged lymph nodes noted in mediastinum as well as a few lung nodules.  - Will need f/u outpatient for findings on CTA PE    Elevated BNP  - CXR with increased interstitial vascular markings indicative of cardiac decompensation. Mild cardiomegaly noted  - EKG normal sinus with R axis deviation  - .4  - Echo scheduled for Monday (8/1)    DM II  - A1c 6.6 on admission, no home medications currently   - Low-dose SSI   - Consider GLP 1 for concomitant treatment of DM II and obesity    CODE STATUS: FULL  Access: PIV  Antibiotics: Vanc and Levo (7/30) day 2  Diet: Diabetic/cardiac  DVT Prophylaxis: Lovenox  GI Prophylaxis: Protonix  Fluids: none    Disposition: Monitor respiratory status and place on BiPAP while sleeping. IV antibiotic for CAP and cellulitis treatment. Echo to be done 8/1. Will need to arrange Trilogy outpatient.    Lauri Tan MD  [facility] -I

## 2022-08-01 LAB
ALBUMIN SERPL-MCNC: 3 GM/DL (ref 3.5–5)
ALBUMIN/GLOB SERPL: 0.9 RATIO (ref 1.1–2)
ALP SERPL-CCNC: 89 UNIT/L (ref 40–150)
ALT SERPL-CCNC: 32 UNIT/L (ref 0–55)
AST SERPL-CCNC: 16 UNIT/L (ref 5–34)
BASOPHILS # BLD AUTO: 0.02 X10(3)/MCL (ref 0–0.2)
BASOPHILS NFR BLD AUTO: 0.3 %
BILIRUBIN DIRECT+TOT PNL SERPL-MCNC: 0.7 MG/DL
BSA FOR ECHO PROCEDURE: 2.77 M2
BUN SERPL-MCNC: 9.6 MG/DL (ref 8.9–20.6)
CALCIUM SERPL-MCNC: 8.7 MG/DL (ref 8.4–10.2)
CHLORIDE SERPL-SCNC: 98 MMOL/L (ref 98–107)
CO2 SERPL-SCNC: 33 MMOL/L (ref 22–29)
CREAT SERPL-MCNC: 0.66 MG/DL (ref 0.73–1.18)
CV ECHO LV RWT: 0.37 CM
DOP CALC LVOT AREA: 3.9 CM2
DOP CALC LVOT DIAMETER: 2.23 CM
DOP CALC MV VTI: 24.3 CM
E WAVE DECELERATION TIME: 249.69 MSEC
E/A RATIO: 1.57
ECHO LV POSTERIOR WALL: 0.99 CM (ref 0.6–1.1)
EJECTION FRACTION: 55 %
EOSINOPHIL # BLD AUTO: 0.13 X10(3)/MCL (ref 0–0.9)
EOSINOPHIL NFR BLD AUTO: 1.7 %
ERYTHROCYTE [DISTWIDTH] IN BLOOD BY AUTOMATED COUNT: 16 % (ref 11.5–17)
FRACTIONAL SHORTENING: 27 % (ref 28–44)
GLOBULIN SER-MCNC: 3.5 GM/DL (ref 2.4–3.5)
GLUCOSE SERPL-MCNC: 86 MG/DL (ref 74–100)
HCT VFR BLD AUTO: 53.9 % (ref 42–52)
HGB BLD-MCNC: 15.5 GM/DL (ref 14–18)
IMM GRANULOCYTES # BLD AUTO: 0.01 X10(3)/MCL (ref 0–0.04)
IMM GRANULOCYTES NFR BLD AUTO: 0.1 %
INTERVENTRICULAR SEPTUM: 1.18 CM (ref 0.6–1.1)
LEFT ATRIUM SIZE: 4.3 CM
LEFT INTERNAL DIMENSION IN SYSTOLE: 3.93 CM (ref 2.1–4)
LEFT VENTRICLE DIASTOLIC VOLUME INDEX: 56.1 ML/M2
LEFT VENTRICLE DIASTOLIC VOLUME: 140.81 ML
LEFT VENTRICLE MASS INDEX: 92 G/M2
LEFT VENTRICLE SYSTOLIC VOLUME INDEX: 26.7 ML/M2
LEFT VENTRICLE SYSTOLIC VOLUME: 66.93 ML
LEFT VENTRICULAR INTERNAL DIMENSION IN DIASTOLE: 5.39 CM (ref 3.5–6)
LEFT VENTRICULAR MASS: 229.81 G
LV LATERAL E/E' RATIO: 8.55 M/S
LYMPHOCYTES # BLD AUTO: 1.1 X10(3)/MCL (ref 0.6–4.6)
LYMPHOCYTES NFR BLD AUTO: 14.7 %
MAGNESIUM SERPL-MCNC: 1.5 MG/DL (ref 1.6–2.6)
MCH RBC QN AUTO: 27 PG (ref 27–31)
MCHC RBC AUTO-ENTMCNC: 28.8 MG/DL (ref 33–36)
MCV RBC AUTO: 93.9 FL (ref 80–94)
MONOCYTES # BLD AUTO: 1.06 X10(3)/MCL (ref 0.1–1.3)
MONOCYTES NFR BLD AUTO: 14.2 %
MV MEAN GRADIENT: 2 MMHG
MV PEAK A VEL: 0.6 M/S
MV PEAK E VEL: 0.94 M/S
MV PEAK GRADIENT: 6 MMHG
MV STENOSIS PRESSURE HALF TIME: 72.41 MS
MV VALVE AREA P 1/2 METHOD: 3.04 CM2
NEUTROPHILS # BLD AUTO: 5.2 X10(3)/MCL (ref 2.1–9.2)
NEUTROPHILS NFR BLD AUTO: 69 %
NRBC BLD AUTO-RTO: 0 %
PHOSPHATE SERPL-MCNC: 3.5 MG/DL (ref 2.3–4.7)
PISA TR MAX VEL: 2.21 M/S
PLATELET # BLD AUTO: 203 X10(3)/MCL (ref 130–400)
PMV BLD AUTO: 11.1 FL (ref 7.4–10.4)
POCT GLUCOSE: 116 MG/DL (ref 70–110)
POCT GLUCOSE: 143 MG/DL (ref 70–110)
POCT GLUCOSE: 96 MG/DL (ref 70–110)
POCT GLUCOSE: 98 MG/DL (ref 70–110)
POTASSIUM SERPL-SCNC: 4.4 MMOL/L (ref 3.5–5.1)
PROT SERPL-MCNC: 6.5 GM/DL (ref 6.4–8.3)
RA PRESSURE: 8 MMHG
RBC # BLD AUTO: 5.74 X10(6)/MCL (ref 4.7–6.1)
RIGHT VENTRICULAR END-DIASTOLIC DIMENSION: 3.8 CM
SODIUM SERPL-SCNC: 138 MMOL/L (ref 136–145)
TDI LATERAL: 0.11 M/S
TR MAX PG: 20 MMHG
TV REST PULMONARY ARTERY PRESSURE: 28 MMHG
VANCOMYCIN SERPL-MCNC: 12 UG/ML (ref 15–20)
VANCOMYCIN SERPL-MCNC: 6.7 UG/ML (ref 15–20)
WBC # SPEC AUTO: 7.5 X10(3)/MCL (ref 4.5–11.5)

## 2022-08-01 PROCEDURE — 80053 COMPREHEN METABOLIC PANEL: CPT

## 2022-08-01 PROCEDURE — 25000003 PHARM REV CODE 250

## 2022-08-01 PROCEDURE — 96366 THER/PROPH/DIAG IV INF ADDON: CPT

## 2022-08-01 PROCEDURE — 96365 THER/PROPH/DIAG IV INF INIT: CPT | Mod: 59

## 2022-08-01 PROCEDURE — 97116 GAIT TRAINING THERAPY: CPT

## 2022-08-01 PROCEDURE — 96368 THER/DIAG CONCURRENT INF: CPT

## 2022-08-01 PROCEDURE — 27000221 HC OXYGEN, UP TO 24 HOURS

## 2022-08-01 PROCEDURE — 97166 OT EVAL MOD COMPLEX 45 MIN: CPT

## 2022-08-01 PROCEDURE — 63600175 PHARM REV CODE 636 W HCPCS

## 2022-08-01 PROCEDURE — 80202 ASSAY OF VANCOMYCIN: CPT | Performed by: STUDENT IN AN ORGANIZED HEALTH CARE EDUCATION/TRAINING PROGRAM

## 2022-08-01 PROCEDURE — 36415 COLL VENOUS BLD VENIPUNCTURE: CPT | Performed by: STUDENT IN AN ORGANIZED HEALTH CARE EDUCATION/TRAINING PROGRAM

## 2022-08-01 PROCEDURE — 99900035 HC TECH TIME PER 15 MIN (STAT)

## 2022-08-01 PROCEDURE — 83735 ASSAY OF MAGNESIUM: CPT

## 2022-08-01 PROCEDURE — 84100 ASSAY OF PHOSPHORUS: CPT

## 2022-08-01 PROCEDURE — 63700000 PHARM REV CODE 250 ALT 637 W/O HCPCS

## 2022-08-01 PROCEDURE — 63600175 PHARM REV CODE 636 W HCPCS: Performed by: STUDENT IN AN ORGANIZED HEALTH CARE EDUCATION/TRAINING PROGRAM

## 2022-08-01 PROCEDURE — G0378 HOSPITAL OBSERVATION PER HR: HCPCS

## 2022-08-01 PROCEDURE — 25500020 PHARM REV CODE 255: Performed by: INTERNAL MEDICINE

## 2022-08-01 PROCEDURE — 94799 UNLISTED PULMONARY SVC/PX: CPT

## 2022-08-01 PROCEDURE — 36415 COLL VENOUS BLD VENIPUNCTURE: CPT

## 2022-08-01 PROCEDURE — 96372 THER/PROPH/DIAG INJ SC/IM: CPT | Performed by: STUDENT IN AN ORGANIZED HEALTH CARE EDUCATION/TRAINING PROGRAM

## 2022-08-01 PROCEDURE — 94660 CPAP INITIATION&MGMT: CPT

## 2022-08-01 PROCEDURE — 25000003 PHARM REV CODE 250: Performed by: STUDENT IN AN ORGANIZED HEALTH CARE EDUCATION/TRAINING PROGRAM

## 2022-08-01 PROCEDURE — 85025 COMPLETE CBC W/AUTO DIFF WBC: CPT

## 2022-08-01 RX ORDER — CLINDAMYCIN HYDROCHLORIDE 150 MG/1
450 CAPSULE ORAL EVERY 8 HOURS
Status: DISCONTINUED | OUTPATIENT
Start: 2022-08-01 | End: 2022-08-02 | Stop reason: HOSPADM

## 2022-08-01 RX ORDER — MAGNESIUM SULFATE HEPTAHYDRATE 40 MG/ML
4 INJECTION, SOLUTION INTRAVENOUS ONCE
Status: COMPLETED | OUTPATIENT
Start: 2022-08-01 | End: 2022-08-01

## 2022-08-01 RX ORDER — MICONAZOLE NITRATE 2 %
POWDER (GRAM) TOPICAL 2 TIMES DAILY
Status: DISCONTINUED | OUTPATIENT
Start: 2022-08-01 | End: 2022-08-02 | Stop reason: HOSPADM

## 2022-08-01 RX ADMIN — ENOXAPARIN SODIUM 40 MG: 40 INJECTION SUBCUTANEOUS at 05:08

## 2022-08-01 RX ADMIN — PANTOPRAZOLE SODIUM 40 MG: 40 TABLET, DELAYED RELEASE ORAL at 08:08

## 2022-08-01 RX ADMIN — CLINDAMYCIN HYDROCHLORIDE 450 MG: 150 CAPSULE ORAL at 02:08

## 2022-08-01 RX ADMIN — MICONAZOLE NITRATE 2 % TOPICAL POWDER: at 08:08

## 2022-08-01 RX ADMIN — HUMAN ALBUMIN MICROSPHERES AND PERFLUTREN 0.11 MG: 10; .22 INJECTION, SOLUTION INTRAVENOUS at 07:08

## 2022-08-01 RX ADMIN — MICONAZOLE NITRATE: 20 CREAM TOPICAL at 08:08

## 2022-08-01 RX ADMIN — VANCOMYCIN HYDROCHLORIDE 1500 MG: 1 INJECTION, POWDER, LYOPHILIZED, FOR SOLUTION INTRAVENOUS at 02:08

## 2022-08-01 RX ADMIN — ENOXAPARIN SODIUM 40 MG: 40 INJECTION SUBCUTANEOUS at 04:08

## 2022-08-01 RX ADMIN — MAGNESIUM SULFATE 4 G: 2 INJECTION INTRAVENOUS at 08:08

## 2022-08-01 RX ADMIN — LEVOFLOXACIN 750 MG: 750 INJECTION, SOLUTION INTRAVENOUS at 02:08

## 2022-08-01 RX ADMIN — FLUCONAZOLE 150 MG: 100 TABLET ORAL at 02:08

## 2022-08-01 RX ADMIN — CLINDAMYCIN HYDROCHLORIDE 450 MG: 150 CAPSULE ORAL at 08:08

## 2022-08-01 NOTE — CONSULTS
Consult for Trilogy. Notified Dr Tan that pt is self pay and an illegal immigrant so we are unable to get this for him. He stated understanding.

## 2022-08-01 NOTE — PT/OT/SLP PROGRESS
Physical Therapy Treatment Note  and Discharge Summary    Name: Tawanda Izaguirre  MRN: [id number]   Principal Problem: <principal problem not specified>       Recommendations:     Discharge Recommendations:  home  Discharge Equipment Recommendations:  none  Barriers to discharge: None    Subjective     Chief Complaint: none  Patient/Family Comments/goals:  Return home  Pain/Comfort:  ·  Pain Rating 1: 0/10  · Pain Rating Post-Intervention 1: 0/10      Objective:     Communicated with nurse Evangelina prior to session.  Patient found ambulatory in room/jimenez with peripheral IV, telemetry upon PT entry to room.     General Precautions: Standard, fall   Orthopedic Precautions:N/A   Braces: N/A  Respiratory Status: Room air     Functional Mobility:  · Bed Mobility:     · Supine to Sit: modified independence  · Transfers:     · Sit to Stand:  modified independence with no AD  · Gait: Pt. ambulated in jimenez and in his room mod I with good balance. Pt. has a wide based gait, otherwise WFL's  · Balance: good    Patient left supine with all lines intact, call button in reach and nurse notified..      Time Tracking:     PT Received On: 22  PT Start Time: 1230     PT Stop Time: 1241  PT Total Time (min): 11 min       Billable Minutes: Gait Training 11       Patient Discharged from acute Physical Therapy on 2022  Pt. Is mod I and met all of his goals.       Assessment:     Patient has met all goals and is not appropriate for therapy.    Objective:     GOALS:   Multidisciplinary Problems     Physical Therapy Goals        Problem: Physical Therapy    Goal Priority Disciplines Outcome Goal Variances Interventions   Physical Therapy Goal     PT, PT/OT      Description: Goals to be met by: 22    Patient will increase functional independence with mobility by performin. Supine to sit with [place]  2. Sit to supine with [place]  3. Sit to stand transfer with [place]  4. Gait  x 260 feet with  [place] using No Assistive Device.   All goals were met                   Reasons for Discontinuation of Therapy Services  Satisfactory goal achievement.      Plan:     Patient Discharged to: Pt. is still a pt. at this hospital.      8/1/2022

## 2022-08-01 NOTE — PROGRESS NOTES
[facility] Medicine Wards Progress Note     Resident Team: [facility] Medicine List 2  Attending Physician: Juan Miller MD    Subjective:      Brief HPI:  32-year-old  male presented to [facility] ED with complaints of suprapubic rash that started about 4 days ago.  Patient states that he initially noticed a pimple in the suprapubic region 4 days ago and believes that he popped them when he was wearing a belt while at work. He work in construction.  Patient states that there is nothing that makes it better or anything that makes it worse.  Patient has not tried anything to alleviate his symptoms.  Patient also reports shortness of breath that he notices more on exertion.  Patient denies fever, chills, lightheadedness, dizziness, chest pain, PND, abdominal pain nausea, vomiting, dysuria, hematuria, hematochezia    Interval History: No acute events overnight. Reporting no fevers/chills. Mild pain over affected area in suprapubic region. C/o difficulty breathing only while wearing BiPAP. States that he has machine at home to help with breathing but unable to characterize further. Denies n/v, headache, chest pain, constipation, dysuria.     Review of Systems:  ROS completed and negative except as indicated above.     Objective:     Last 24 Hour Vital Signs:  BP  Min: 93/58  Max: 115/70  Temp  Av.8 °F (36.6 °C)  Min: 96.8 °F (36 °C)  Max: 98.3 °F (36.8 °C)  Pulse  Av.2  Min: 73  Max: 91  Resp  Av.6  Min: 18  Max: 20  SpO2  Av %  Min: 81 %  Max: 99 %  I/O last 3 completed shifts:  In: 1700 [P.O.:1700]  Out: 600 [Urine:600]    Physical Examination:  General: alert and oriented, no acute distress  HEENT: normocephalic, atraumatic   Respiratory: rhonchi present bilaterally. Breathing somewhat labored 2/2 habitus. No wheezing, crackles, rales.   Cardiovascular: RRR, no murmurs, rubs, or gallops. No peripheral edema. No JVD.  Gastrointestinal: soft, non-tender, non-distended. Normal bowel sounds.  Musculoskeletal:  moves all extremities purposefully  Integumentary: suprapubic region mildly TTP, erythematous, indurated. Multiple comedones over affected area. No fluctuance, drainage.  Neurologic: CNs II-XII are grossly intact.   Cognition and Speech: oriented, speech clear and coherent.     Laboratory:  Most Recent Data:  CBC:   Lab Results   Component Value Date    WBC 7.5 08/01/2022    HGB 15.5 08/01/2022    HCT 53.9 (H) 08/01/2022     08/01/2022    MCV 93.9 08/01/2022    RDW 16.0 08/01/2022     WBC Differential:   Recent Labs   Lab 07/29/22  2045 07/30/22  0153 07/31/22  0505 08/01/22  0442   WBC 7.8 7.0 8.4 7.5   HGB 15.6 16.1 15.2 15.5   HCT 54.4* 54.8* 54.7* 53.9*    220 210 203   MCV 91.9 91.9 94.5* 93.9     BMP:   Lab Results   Component Value Date     08/01/2022    K 4.4 08/01/2022    CO2 33 (H) 08/01/2022    BUN 9.6 08/01/2022    CREATININE 0.66 (L) 08/01/2022    CALCIUM 8.7 08/01/2022    MG 1.50 (L) 08/01/2022    PHOS 3.5 08/01/2022     LFTs:   Lab Results   Component Value Date    ALBUMIN 3.0 (L) 08/01/2022    BILITOT 0.7 08/01/2022    AST 16 08/01/2022    ALKPHOS 89 08/01/2022    ALT 32 08/01/2022     Coags: No results found for: INR, PROTIME, PTT  FLP:   Lab Results   Component Value Date    CHOL 83 07/30/2022    HDL 15 (L) 07/30/2022    TRIG 103 07/30/2022     DM:   Lab Results   Component Value Date    HGBA1C 6.6 07/30/2022    CREATININE 0.66 (L) 08/01/2022     Thyroid:   Lab Results   Component Value Date    TSH 3.2731 07/30/2022      Anemia: No results found for: IRON, TIBC, FERRITIN, SATURATEDIRO  No results found for: TGOVVFTV74  No results found for: FOLATE     Cardiac:   Lab Results   Component Value Date    .4 (H) 07/30/2022       Microbiology Data:  Microbiology Results (last 7 days)     Procedure Component Value Units Date/Time    Blood Culture [[id number]]  (Normal) Collected: 07/29/22 2239    Order Status: Completed Specimen: Blood Updated: 07/31/22 1002     CULTURE, BLOOD  ([facility]) No Growth At 24 Hours    Blood Culture [[id number]]  (Normal) Collected: 07/29/22 2255    Order Status: Completed Specimen: Blood Updated: 07/31/22 1002     CULTURE, BLOOD ([facility]) No Growth At 24 Hours    Respiratory Culture [[id number]]     Order Status: Sent Specimen: Sputum     Gram Stain [[id number]]     Order Status: Sent Specimen: Sputum     Chlamydia/GC, PCR [[id number]]  (Normal) Collected: 07/29/22 2220    Order Status: Completed Specimen: Urine Updated: 07/30/22 0056     Chlamydia trachomatis PCR Not Detected     N. gonorrhea PCR Not Detected         Other Results:    Radiology:  Imaging Results          X-Ray Chest 1 View (Final result)  Result time 07/30/22 08:10:42    Final result by Jorge Holliday MD (07/30/22 08:10:42)                 Impression:      Mild cardiomegaly.    Increase in interstitial and pulmonary vascular markings indicating a degree of pulmonary vascular congestion      Electronically signed by: Jorge Holliday  Date:    07/30/2022  Time:    08:10             Narrative:    EXAMINATION:  XR CHEST 1 VIEW    CPT 36742    CLINICAL HISTORY:  shortness of breath;    FINDINGS:  Mediastinal silhouette to be within normal limits cardiac silhouette is enlarged there is some increase in interstitial and pulmonary vascular markings which may indicate a degree of pulmonary vascular congestion and cardiac decompensation.    No definite focal consolidative changes                               CT Abdomen Pelvis With Contrast (Final result)  Result time 07/30/22 07:45:12    Final result by Layo Sexton MD (07/30/22 07:45:12)                 Impression:      1. Subcutaneous edema along the flanks with suspected cellulitis of the lower anterior abdominal wall.  2. Borderline enlarged inguinal and external iliac lymph nodes, likely reactive change.  3. Mild hepatomegaly with trace ascites.  No major discrepancy with the preliminary radiology report.      Electronically signed by: Layo  Darshan  Date:    07/30/2022  Time:    07:45             Narrative:    EXAMINATION:  CT ABDOMEN PELVIS WITH CONTRAST    CLINICAL HISTORY:  cellulitis, lower abdomen, genital area;    TECHNIQUE:  CT imaging of the abdomen and pelvis after the administration of intravenous contrast. Dose length product is 1436 mGycm. Automatic exposure control, adjustment of mA/kV or iterative reconstruction technique used to limit radiation dose.    COMPARISON:  No relevant comparison studies available at the time of dictation.    FINDINGS:  Liver/biliary: Liver mildly enlarged.  No biliary dilatation.    Pancreas: Normal.    Spleen: Normal.    Adrenals: Normal.    Genitourinary: Symmetric renal enhancement. No hydronephrosis. Bladder under distended with no definitive abnormality.    Stomach/bowel: No evidence of bowel obstruction. Normal appendix. No definitive sites of bowel inflammation.    Lymph nodes: Borderline enlarged bilateral inguinal and external iliac lymph nodes.    Peritoneum: Trace ascites.    Vasculature: Normal abdominal aortic caliber.  Patent SMV, splenic vein and main portal vein.    Abdominal wall: Mild to moderate subcutaneous edema along the flanks.  Areas of mild skin thickening in the lower anterior abdominal wall.    Lung bases: No consolidation or pleural effusion.    Musculoskeletal: No acute osseous findings.                    Preliminary result by [employer], Rad Results In (07/29/22 21:46:46)                 Narrative:    START OF REPORT:  Technique: CT of the abdomen and pelvis was performed with axial images as well as sagittal and coronal reconstruction images with intravenous contrast but without oral contrast.    Comparison: None available.    Clinical History: Cellulitis, lower abdomen, genital area.    Dosage Information: Automated Exposure Control was utilized 1436.38 mGy.cm.    Findings:  Lines and Tubes: None.  Thorax:  Lungs: The visualized lung bases appear unremarkable.  Pleura: No  effusions or thickening.  Heart: The heart size is within normal limits.  Abdomen:  Abdominal Wall: There is moderate diffuse subcutaneous fat stranding along the ventral mid and lower abdominal wall with associated mild cutaneous thickening. This may reflect cellulitis. No discrete fluid collection or abscess is identified.  Liver: The liver appears unremarkable.  Biliary System: No extrahepatic biliary duct dilatation is seen.  Gallbladder: The gallbladder is non-distended and appears otherwise unremarkable.  Pancreas: The pancreas appears unremarkable.  Spleen: The spleen appears unremarkable.  Adrenals: The adrenal glands appear unremarkable.  Kidneys: The kidneys appear unremarkable with no stones cysts masses or hydronephrosis.  Aorta: The abdominal aorta appears unremarkable.  IVC: Unremarkable.  Bowel:  Esophagus: The visualized esophagus appears unremarkable.  Stomach: The stomach appears unremarkable.  Duodenum: Unremarkable appearing duodenum.  Small Bowel: The small bowel appears unremarkable.  Colon: Nondistended.  Appendix: The appendix appears unremarkable (series 2 image [id number]).  Peritoneum: No intraperitoneal free air or ascites is seen.    Pelvis:  Bladder: The bladder appears unremarkable.  Male:  Prostate gland: The prostate gland appears unremarkable.  Inguinal Findings: Multiple prominent inguinal lymph nodes are seen bilaterally. This is consistent with reactive lymphadenopathy.  Inguinal Hernia: Incidental note is made of small uncomplicated mesenteric fat containing right inguinal hernia.    Bony structures:  Dorsal Spine: The visualized dorsal spine appears unremarkable.      Impression:  1. There is moderate diffuse subcutaneous fat stranding along the ventral mid and lower abdominal wall with associated mild cutaneous thickening. This may reflect cellulitis. No discrete fluid collection or abscess is identified.  2. Multiple prominent inguinal lymph nodes are seen bilaterally. This is  consistent with reactive lymphadenopathy.  3. No acute intraabdominal or pelvic solid organ or bowel pathology identified. Details and other findings as discussed above.                      Preliminary result by Layo Sexton MD (07/29/22 21:46:46)                 Narrative:    START OF REPORT:  Technique: CT of the abdomen and pelvis was performed with axial images as well as sagittal and coronal reconstruction images with intravenous contrast but without oral contrast.    Comparison: None available.    Clinical History: Cellulitis, lower abdomen, genital area.    Dosage Information: Automated Exposure Control was utilized 1436.38 mGy.cm.    Findings:  Lines and Tubes: None.  Thorax:  Lungs: The visualized lung bases appear unremarkable.  Pleura: No effusions or thickening.  Heart: The heart size is within normal limits.  Abdomen:  Abdominal Wall: There is moderate diffuse subcutaneous fat stranding along the ventral mid and lower abdominal wall with associated mild cutaneous thickening. This may reflect cellulitis. No discrete fluid collection or abscess is identified.  Liver: The liver appears unremarkable.  Biliary System: No extrahepatic biliary duct dilatation is seen.  Gallbladder: The gallbladder is non-distended and appears otherwise unremarkable.  Pancreas: The pancreas appears unremarkable.  Spleen: The spleen appears unremarkable.  Adrenals: The adrenal glands appear unremarkable.  Kidneys: The kidneys appear unremarkable with no stones cysts masses or hydronephrosis.  Aorta: The abdominal aorta appears unremarkable.  IVC: Unremarkable.  Bowel:  Esophagus: The visualized esophagus appears unremarkable.  Stomach: The stomach appears unremarkable.  Duodenum: Unremarkable appearing duodenum.  Small Bowel: The small bowel appears unremarkable.  Colon: Nondistended.  Appendix: The appendix appears unremarkable (series 2 image 61-71).  Peritoneum: No intraperitoneal free air or ascites is  seen.    Pelvis:  Bladder: The bladder appears unremarkable.  Male:  Prostate gland: The prostate gland appears unremarkable.  Inguinal Findings: Multiple prominent inguinal lymph nodes are seen bilaterally. This is consistent with reactive lymphadenopathy.  Inguinal Hernia: Incidental note is made of small uncomplicated mesenteric fat containing right inguinal hernia.    Bony structures:  Dorsal Spine: The visualized dorsal spine appears unremarkable.      Impression:  1. There is moderate diffuse subcutaneous fat stranding along the ventral mid and lower abdominal wall with associated mild cutaneous thickening. This may reflect cellulitis. No discrete fluid collection or abscess is identified.  2. Multiple prominent inguinal lymph nodes are seen bilaterally. This is consistent with reactive lymphadenopathy.  3. No acute intraabdominal or pelvic solid organ or bowel pathology identified. Details and other findings as discussed above.                                Current Medications:     Infusions:       Scheduled:   enoxaparin  40 mg Subcutaneous Q12H    levoFLOXacin  750 mg Intravenous Q24H    magnesium sulfate IVPB  4 g Intravenous Once    miconazole   Topical (Top) BID    pantoprazole  40 mg Oral Daily        PRN:  dextrose 10%, dextrose 10%, glucagon (human recombinant), glucose, glucose, insulin aspart U-100, naloxone, sodium chloride 0.9%    Assessment & Plan:     Suprapubic cellulitis  - Suprapubic erythema, mildly tender to touch  - CRP 13, ESR and WBC wnl  - CTAP findings consistent with cellulitis   - Afebrile on presentation. Blood Cx x 2 with NGTD @48h  - Continue Vancomycin and levofloxacin (started 7/30) day 3, discontinued on (8/1). De-escalate to clindamycin 450 mg (started 8/1)  - Poss fungal involvement, topical miconazole cream 2% replaced with miconazole 2% powder BID per wound care recs  - Wound care: CHG wipes, miconazole 2% powder and systemic antifungals per recs. Start PO fluconazole  150 mg qd (started 8/1). Appreciate recs     Respiratory acidosis 2/2 Obesity hypoventilation syndrome  - BMI 77  - In ED, O2 sats dropped into 50s while he was sleeping  - ABG pH is 7.26, pCO2 80, PO2 67  - Patient was placed on a BiPAP to be worn anytime patient is sleeping during day or night  - Please avoid sedative medications to prevent further inhibition of respiratory drive  - Unable to get Trilogy outpatient due to immigration status  - Ambulatory sats to be done today.     Community-acquired pneumonia  - Bilateral infiltrates present on a chest x-ray with left-sided pleural effusion. Heart border difficult to appreciate on CXR  - Sputum Cx has not been collected  - Started patient on Levaquin 750 mg, d/c'd Levaquin (8/1)     Elevated D-dimer  - D-dimer of 1.25  - Patient is on full-dose lovenox. Transitioned to prophylactic dose if CTA PE negative.  - CTA PE negative for PE. Multiple enlarged lymph nodes noted in mediastinum as well as a few lung nodules.  - Will need f/u outpatient for findings on CTA PE     Elevated BNP  - CXR with increased interstitial vascular markings indicative of cardiac decompensation. Mild cardiomegaly noted  - EKG normal sinus with R axis deviation  - .4  - Echo completed 8/1, pending official read     DM II  - A1c 6.6 on admission, no home medications currently   - Low-dose SSI   - Consider GLP 1 for concomitant treatment of DM II and obesity     CODE STATUS: FULL  Access: PIV  Antimicrobials: Clindamycin and fluconazole (started 8/1)  Diet: Diabetic/cardiac  DVT Prophylaxis: Lovenox  GI Prophylaxis: Protonix  Fluids: none    Disposition: Monitor respiratory status and place on BiPAP only while sleeping. F/u Echo official read. Discharge pending results of ambulatory sats.    Lauri Tan MD  [facility]

## 2022-08-01 NOTE — PT/OT/SLP EVAL
Occupational Therapy   Evaluation/Eval only    Name: Tawanda Izaguirre  MRN: [id number]  Admitting Diagnosis:    Patient Active Problem List   Diagnosis    Cellulitis of suprapubic region    SOB (shortness of breath)    Obesity hypoventilation syndrome    Acute hypercapnic respiratory failure     Recent Surgery: * No surgery found *      Recommendations:     Discharge Recommendations: home  Discharge Equipment Recommendations:  none  Barriers to discharge:  None    Assessment:     Tawanda Izaguirre is a 32 y.o. male with a medical diagnosis of see above.  Pt is supervision ambulation in room and for basic self care tasks although thoroughness in hygiene is fair due to obesity; pt educated and instructed with adaptive epuipment  to assist and improve efficiency with LB dressing however despite instruction, pt declined adaptive equipment.        Plan:     Patient to be seen  (OT eval only) to address the above listed problems via    · Plan of Care Expires: 08/01/22  · Plan of Care Reviewed with: patient    Subjective     Chief Complaint: no c/o  Patient/Family Comments/goals: return home and to work ASAP    Occupational Profile:  Living Environment:Pt lives with his brother in single story home with 3 steps and R rail;   Previous level of function: Pt was Independent basic and extended ADL's and working full time doing  construction work  Equipment Used at Home:  none  Assistance upon Discharge: pt will have assistance from his brother     Pain/Comfort:  · Pain Rating 1: 0/10  · Pain Rating Post-Intervention 1: 0/10    Patients cultural, spiritual, Alevism conflicts given the current situation: no    Objective:     Communicated with: Nurse Evangelina prior to session.  Patient found up in chair with peripheral IV, telemetry upon OT entry to room.    General Precautions: Standard, other (see comments) (per nurse, BiPAP when in bed)   Orthopedic Precautions:N/A   Braces: N/A  Respiratory Status: Room air  except CPAP for sleeping per nurse       Functional Mobility/Transfers:  · Patient completed Bed <> Chair Transfer using Stand Pivot technique with modified independence with no assistive device  · Patient completed Toilet Transfer Stand Pivot technique with supervision with  no AD  · Functional Mobility: supervision ambulate in room without AD , no c/o fatigue and no LOB to toilet and lavatory for grooming task     Activities of Daily Living:  · Feeding:  independence .  · Grooming: modified independence standing at sink   · Lower Body Dressing: modified independence shorts and socks with increased time  and difficulty for socks however despite instruction and education, pt denied need for AE   · Toileting: modified independence  clothing mgt and hygiene however thoroughness fair due to obesity which challanges ability to reach issa area for thorough cleaning     Cognitive/Visual Perceptual:  alert and oriented x 4 ; followed basic commands; translation utilized at times     Physical Exam:  Balance: -       sitting balance EOB WFL ; standing balance for basic self care tasks mod Independent ; no LOB   Dominant hand: -       Right  Upper Extremity Range of Motion:  -       Right Upper Extremity: WFL  -       Left Upper Extremity: WFL  Upper Extremity Strength: -       Right Upper Extremity: WFL  -       Left Upper Extremity: WFL   Strength: -       Right Upper Extremity: WFL  -       Left Upper Extremity: WFL  Fine Motor Coordination: -       Intact  Gross motor coordination: WFL      Treatment & Education:  Educated and instructed in use of AE to improve efficiency in LB dressing; pt denied need for AE  Education:    Patient left up on toilet with all lines intact and Nurse Evangelina and CNA  notified that pt was on the toilet    GOALS:   Multidisciplinary Problems     Occupational Therapy Goals     Not on file                History:     History reviewed. No pertinent past medical history.    History reviewed. No  pertinent surgical history.    Time Tracking:     OT Date of Treatment: 08/01/22  OT Start Time: 0955  OT Stop Time: 1017  OT Total Time (min): 22 min    Billable Minutes:Evaluation 22 min    8/1/2022

## 2022-08-01 NOTE — PLAN OF CARE
Received call from Dr Paige stating pt needs home O2. We discussed how pt is self pay and he is an illegal immigrant and how he will need to pay OOP, I notified her that the cheapest O2 around is with [employer] @ $120/mth. I let her know that I would reach out to [employer] and get back with her. Left VM for Lucille @ [employer], will follow.    Received call back from Lucille. She stated that she has [place] speaking representative that can assist pt. Sent referral on pt behalf. Notified Dr Paige.

## 2022-08-01 NOTE — CONSULTS
Patient is seen at bedside to eval and treat lower abdomen/pannus cellulitis. Erythema and induration noted without open wounds or draining at this time. 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 Pt also noted with fungal intertrigo to folds. Area was cleaned well with CHG wipes. Antifungal cream currently ordered that will be changed to powder to assist with moisture absorption. Pt would also benefit from systemic antifungals. This was recommenced to the IM team. Wound care to continue to follow as needed. (see admission photos below)

## 2022-08-01 NOTE — PROGRESS NOTES
Pharmacokinetic Assessment Follow Up: IV Vancomycin    Vancomycin serum concentration assessment(s):    The trough level was drawn correctly and can be used to guide therapy at this time. The measurement is within the desired definitive target range of 10 to 15 mcg/mL.    Vancomycin Regimen Plan:    Change regimen to Vancomycin 1500 mg IV every 12 hours with next serum trough concentration measured at 2300 prior to 0000 dose on 08/2    Drug levels (last 3 results):  Recent Labs   Lab Result Units 07/30/22  0150 07/30/22  2214 07/31/22  0505 07/31/22  1700 07/31/22  2306   Vanc Lvl Random ug/ml  --  15.6  --  20.7* 12.0*   Vancomycin Trough ug/ml <1.4*  --  21.4*  --   --        Pharmacy will continue to follow and monitor vancomycin.    Please contact pharmacy at extension [phone]   for questions regarding this assessment.    Thank you for the consult,   Shae Olivera       Patient brief summary:  Taawnda Izaguirre is a 32 y.o. male initiated on antimicrobial therapy with IV Vancomycin for treatment of skin & soft tissue infection    The patient's current regimen is Vancomycin 1500mg q 12 hours      Drug Allergies:   Review of patient's allergies indicates:  No Known Allergies    Actual Body Weight:   179.6kg      Renal Function:   Estimated Creatinine Clearance: 227.1 mL/min (A) (based on SCr of 0.66 mg/dL (L)).,     Dialysis Method (if applicable):  N/A      CBC (last 72 hours):  Recent Labs   Lab Result Units 07/29/22 2045 07/30/22  0153 07/31/22  0505 08/01/22  0442   WBC x10(3)/mcL 7.8 7.0 8.4 7.5   Hgb gm/dL 15.6 16.1 15.2 15.5   Hemoglobin A1c %  --  6.6  --   --    Hct % 54.4* 54.8* 54.7* 53.9*   Platelet x10(3)/mcL 228 220 210 203   Mono % % 14.2 13.2 16.6 14.2   Eos % % 1.5 1.9 1.4 1.7   Basophil % % 0.4 0.3 0.4 0.3       Metabolic Panel (last 72 hours):  Recent Labs   Lab Result Units 07/29/22 2045 07/29/22  2220 07/30/22  0153 07/30/22  0201 07/31/22  0505 08/01/22  0442   Sodium Level mmol/L 142  --   141  --  144 138   Potassium Level mmol/L 4.4  --  4.5  --  4.9 4.4   Chloride mmol/L 100  --  102  --  101 98   Carbon Dioxide mmol/L 35*  --  32*  --  36* 33*   Glucose Level mg/dL 86  --  102*  --  95 86   Glucose, UA mg/dL  --  Normal  --   --   --   --    Blood Urea Nitrogen mg/dL 20.5  --  18.9  --  11.2 9.6   Creatinine mg/dL 0.90  --  0.75  --  0.73 0.66*   Albumin Level gm/dL 3.1*  --  3.0*  --  3.0* 3.0*   Bilirubin Total mg/dL 0.8  --  0.5  --  0.5 0.7   Alkaline Phosphatase unit/L 108  --  111  --  93 89   Aspartate Aminotransferase unit/L 27  --  24  --  20 16   Alanine Aminotransferase unit/L 50  --  51  --  42 32   Magnesium Level mg/dL  --   --   --  1.60 1.60 1.50*   Phosphorus Level mg/dL  --   --   --  3.9 4.2 3.5       Vancomycin Administrations:  vancomycin given in the last 96 hours                     vancomycin (VANCOCIN) 1,500 mg in sodium chloride 0.9% 250 mL IVPB (mg) 1,500 mg New Bag 08/01/22 0209    vancomycin 1750 mg in 0.9% sodium chloride 500 mL IVPB (mg) 1,750 mg New Bag 07/31/22 1028    vancomycin 1750 mg in 0.9% sodium chloride 500 mL IVPB (mg) 1,750 mg New Bag 07/30/22 2240     1,750 mg New Bag  1325    vancomycin (VANCOCIN) 2,000 mg in sodium chloride 0.9% 500 mL IVPB (mg) 2,000 mg New Bag 07/30/22 0247                    Microbiologic Results:  Microbiology Results (last 7 days)       Procedure Component Value Units Date/Time    Blood Culture [[id number]]  (Normal) Collected: 07/29/22 2239    Order Status: Completed Specimen: Blood Updated: 07/31/22 1002     CULTURE, BLOOD ([facility]) No Growth At 24 Hours    Blood Culture [[id number]]  (Normal) Collected: 07/29/22 2255    Order Status: Completed Specimen: Blood Updated: 07/31/22 1002     CULTURE, BLOOD ([facility]) No Growth At 24 Hours    Respiratory Culture [[id number]]     Order Status: Sent Specimen: Sputum     Gram Stain [[id number]]     Order Status: Sent Specimen: Sputum     Chlamydia/GC, PCR [[id number]]  (Normal) Collected: 07/29/22  2220    Order Status: Completed Specimen: Urine Updated: 07/30/22 0056     Chlamydia trachomatis PCR Not Detected     N. gonorrhea PCR Not Detected

## 2022-08-02 VITALS
BODY MASS INDEX: 61.84 KG/M2 | OXYGEN SATURATION: 99 % | HEART RATE: 67 BPM | HEIGHT: 60 IN | SYSTOLIC BLOOD PRESSURE: 120 MMHG | DIASTOLIC BLOOD PRESSURE: 73 MMHG | WEIGHT: 315 LBS | TEMPERATURE: 98 F | RESPIRATION RATE: 16 BRPM

## 2022-08-02 LAB
ALBUMIN SERPL-MCNC: 3.1 GM/DL (ref 3.5–5)
ALBUMIN/GLOB SERPL: 0.8 RATIO (ref 1.1–2)
ALP SERPL-CCNC: 93 UNIT/L (ref 40–150)
ALT SERPL-CCNC: 29 UNIT/L (ref 0–55)
AST SERPL-CCNC: 23 UNIT/L (ref 5–34)
BASOPHILS # BLD AUTO: 0.02 X10(3)/MCL (ref 0–0.2)
BASOPHILS NFR BLD AUTO: 0.3 %
BILIRUBIN DIRECT+TOT PNL SERPL-MCNC: 0.8 MG/DL
BUN SERPL-MCNC: 7.7 MG/DL (ref 8.9–20.6)
CALCIUM SERPL-MCNC: 8.9 MG/DL (ref 8.4–10.2)
CHLORIDE SERPL-SCNC: 93 MMOL/L (ref 98–107)
CO2 SERPL-SCNC: 38 MMOL/L (ref 22–29)
CREAT SERPL-MCNC: 0.72 MG/DL (ref 0.73–1.18)
EOSINOPHIL # BLD AUTO: 0.11 X10(3)/MCL (ref 0–0.9)
EOSINOPHIL NFR BLD AUTO: 1.8 %
ERYTHROCYTE [DISTWIDTH] IN BLOOD BY AUTOMATED COUNT: 16 % (ref 11.5–17)
GLOBULIN SER-MCNC: 3.7 GM/DL (ref 2.4–3.5)
GLUCOSE SERPL-MCNC: 99 MG/DL (ref 74–100)
HCT VFR BLD AUTO: 53.5 % (ref 42–52)
HGB BLD-MCNC: 15.6 GM/DL (ref 14–18)
IMM GRANULOCYTES # BLD AUTO: 0.01 X10(3)/MCL (ref 0–0.04)
IMM GRANULOCYTES NFR BLD AUTO: 0.2 %
LYMPHOCYTES # BLD AUTO: 1.23 X10(3)/MCL (ref 0.6–4.6)
LYMPHOCYTES NFR BLD AUTO: 20.7 %
MCH RBC QN AUTO: 26.3 PG (ref 27–31)
MCHC RBC AUTO-ENTMCNC: 29.2 MG/DL (ref 33–36)
MCV RBC AUTO: 90.2 FL (ref 80–94)
MONOCYTES # BLD AUTO: 0.78 X10(3)/MCL (ref 0.1–1.3)
MONOCYTES NFR BLD AUTO: 13.1 %
NEUTROPHILS # BLD AUTO: 3.8 X10(3)/MCL (ref 2.1–9.2)
NEUTROPHILS NFR BLD AUTO: 63.9 %
NRBC BLD AUTO-RTO: 0 %
PLATELET # BLD AUTO: 196 X10(3)/MCL (ref 130–400)
PMV BLD AUTO: 11 FL (ref 7.4–10.4)
POCT GLUCOSE: 118 MG/DL (ref 70–110)
POCT GLUCOSE: 120 MG/DL (ref 70–110)
POCT GLUCOSE: 93 MG/DL (ref 70–110)
POTASSIUM SERPL-SCNC: 4.1 MMOL/L (ref 3.5–5.1)
PROT SERPL-MCNC: 6.8 GM/DL (ref 6.4–8.3)
RBC # BLD AUTO: 5.93 X10(6)/MCL (ref 4.7–6.1)
SODIUM SERPL-SCNC: 140 MMOL/L (ref 136–145)
WBC # SPEC AUTO: 6 X10(3)/MCL (ref 4.5–11.5)

## 2022-08-02 PROCEDURE — 94761 N-INVAS EAR/PLS OXIMETRY MLT: CPT

## 2022-08-02 PROCEDURE — 25000003 PHARM REV CODE 250: Performed by: STUDENT IN AN ORGANIZED HEALTH CARE EDUCATION/TRAINING PROGRAM

## 2022-08-02 PROCEDURE — 94660 CPAP INITIATION&MGMT: CPT

## 2022-08-02 PROCEDURE — 25000003 PHARM REV CODE 250

## 2022-08-02 PROCEDURE — 85025 COMPLETE CBC W/AUTO DIFF WBC: CPT

## 2022-08-02 PROCEDURE — 36415 COLL VENOUS BLD VENIPUNCTURE: CPT

## 2022-08-02 PROCEDURE — 63700000 PHARM REV CODE 250 ALT 637 W/O HCPCS

## 2022-08-02 PROCEDURE — 80053 COMPREHEN METABOLIC PANEL: CPT

## 2022-08-02 PROCEDURE — G0378 HOSPITAL OBSERVATION PER HR: HCPCS

## 2022-08-02 PROCEDURE — 96372 THER/PROPH/DIAG INJ SC/IM: CPT | Performed by: STUDENT IN AN ORGANIZED HEALTH CARE EDUCATION/TRAINING PROGRAM

## 2022-08-02 PROCEDURE — 27000221 HC OXYGEN, UP TO 24 HOURS

## 2022-08-02 PROCEDURE — 63600175 PHARM REV CODE 636 W HCPCS: Performed by: STUDENT IN AN ORGANIZED HEALTH CARE EDUCATION/TRAINING PROGRAM

## 2022-08-02 PROCEDURE — 99900035 HC TECH TIME PER 15 MIN (STAT)

## 2022-08-02 RX ORDER — PEN NEEDLE, DIABETIC 32GX 5/32"
1 NEEDLE, DISPOSABLE MISCELLANEOUS DAILY
Qty: 30 EACH | Refills: 1 | Status: SHIPPED | OUTPATIENT
Start: 2022-08-02 | End: 2022-11-16

## 2022-08-02 RX ORDER — MICONAZOLE NITRATE 2 %
POWDER (GRAM) TOPICAL 2 TIMES DAILY
Qty: 85 G | Refills: 0 | Status: SHIPPED | OUTPATIENT
Start: 2022-08-02 | End: 2022-11-16

## 2022-08-02 RX ORDER — CLINDAMYCIN HYDROCHLORIDE 150 MG/1
450 CAPSULE ORAL EVERY 8 HOURS
Qty: 54 CAPSULE | Refills: 0 | Status: SHIPPED | OUTPATIENT
Start: 2022-08-02 | End: 2022-08-08

## 2022-08-02 RX ORDER — FLUCONAZOLE 150 MG/1
150 TABLET ORAL
Qty: 3 TABLET | Refills: 0 | Status: SHIPPED | OUTPATIENT
Start: 2022-08-09 | End: 2022-08-30

## 2022-08-02 RX ORDER — FLUCONAZOLE 150 MG/1
150 TABLET ORAL
Qty: 3 TABLET | Refills: 0 | Status: SHIPPED | OUTPATIENT
Start: 2022-08-02 | End: 2022-08-02 | Stop reason: SDUPTHER

## 2022-08-02 RX ADMIN — FLUCONAZOLE 150 MG: 100 TABLET ORAL at 09:08

## 2022-08-02 RX ADMIN — CLINDAMYCIN HYDROCHLORIDE 450 MG: 150 CAPSULE ORAL at 01:08

## 2022-08-02 RX ADMIN — ENOXAPARIN SODIUM 40 MG: 40 INJECTION SUBCUTANEOUS at 05:08

## 2022-08-02 RX ADMIN — PANTOPRAZOLE SODIUM 40 MG: 40 TABLET, DELAYED RELEASE ORAL at 09:08

## 2022-08-02 RX ADMIN — MICONAZOLE NITRATE 2 % TOPICAL POWDER: at 11:08

## 2022-08-02 RX ADMIN — CLINDAMYCIN HYDROCHLORIDE 450 MG: 150 CAPSULE ORAL at 05:08

## 2022-08-02 NOTE — PLAN OF CARE
Problem: Adult Inpatient Plan of Care  Goal: Plan of Care Review  Outcome: Ongoing, Progressing  Goal: Patient-Specific Goal (Individualized)  Outcome: Ongoing, Progressing  Goal: Absence of Hospital-Acquired Illness or Injury  Outcome: Ongoing, Progressing  Goal: Optimal Comfort and Wellbeing  Outcome: Ongoing, Progressing  Goal: Readiness for Transition of Care  Outcome: Ongoing, Progressing     Problem: Bariatric Environmental Safety  Goal: Safety Maintained with Care  Outcome: Ongoing, Progressing     Problem: Infection  Goal: Absence of Infection Signs and Symptoms  Outcome: Ongoing, Progressing     Problem: Pain Acute  Goal: Acceptable Pain Control and Functional Ability  Outcome: Ongoing, Progressing     Problem: Impaired Wound Healing  Goal: Optimal Wound Healing  Outcome: Ongoing, Progressing

## 2022-08-02 NOTE — PLAN OF CARE
Obtained O2 script from Dr Paige. Emailing script to Elisa Lester @ [email or web address]. She has DME companies that they use. She told me that pt agreed to pay for O2 mthly, she will handle referrals and delivery to hospital.    Sent referral to [facility] along with script, they will deliver tanks to hospital.    I handled the rest of pt referral. He will pay $162.67 cash for the first mth of O2, [facility] will collect @ pickup. He will get a bank card in the meantime for future purchases. Notified Dr Paige and nurse Sandra, as well as Caitlyn with [facility].

## 2022-08-02 NOTE — PLAN OF CARE
Called Elisa MARADIAGA with [facility] and she said that she was unaware that she was assigned this patient. She stated that she is calling him right now. Notified Dr Paige.

## 2022-08-02 NOTE — HPI
32-year-old  male presented to [facility] ED with complaints of suprapubic rash that started about 4 days ago.  Patient states that he initially noticed a pimple in the suprapubic region 4 days ago and believes that he popped them when he was wearing a belt while at work. He work in construction.  Patient states that there is nothing that makes it better or anything that makes it worse.  Patient has not tried anything to alleviate his symptoms.  Patient also reports shortness of breath that he notices more on exertion.  Patient denies fever, chills, lightheadedness, dizziness, chest pain, PND, abdominal pain nausea, vomiting, dysuria, hematuria, hematochezia

## 2022-08-04 LAB
BACTERIA BLD CULT: NORMAL
BACTERIA BLD CULT: NORMAL

## 2022-09-08 ENCOUNTER — HOSPITAL ENCOUNTER (EMERGENCY)
Facility: HOSPITAL | Age: 32
Discharge: HOME | End: 2022-09-09
Attending: INTERNAL MEDICINE

## 2022-09-08 DIAGNOSIS — R07.9 CHEST PAIN: ICD-10-CM

## 2022-09-08 DIAGNOSIS — R07.89 ATYPICAL CHEST PAIN: Primary | ICD-10-CM

## 2022-09-08 LAB
ANION GAP SERPL CALC-SCNC: 9 MEQ/L
BUN SERPL-MCNC: 13.3 MG/DL (ref 8.9–20.6)
CALCIUM SERPL-MCNC: 9.6 MG/DL (ref 8.4–10.2)
CHLORIDE SERPL-SCNC: 97 MMOL/L (ref 98–107)
CO2 SERPL-SCNC: 31 MMOL/L (ref 22–29)
CREAT SERPL-MCNC: 0.76 MG/DL (ref 0.73–1.18)
CREAT/UREA NIT SERPL: 18
D DIMER PPP IA.FEU-MCNC: <0.27 UG/ML FEU (ref 0–0.5)
GFR SERPLBLD CREATININE-BSD FMLA CKD-EPI: >60 MLS/MIN/1.73/M2
GLUCOSE SERPL-MCNC: 105 MG/DL (ref 74–100)
POTASSIUM SERPL-SCNC: 3.7 MMOL/L (ref 3.5–5.1)
SODIUM SERPL-SCNC: 137 MMOL/L (ref 136–145)
TROPONIN I SERPL-MCNC: 0.02 NG/ML (ref 0–0.04)

## 2022-09-08 PROCEDURE — 85379 FIBRIN DEGRADATION QUANT: CPT | Performed by: INTERNAL MEDICINE

## 2022-09-08 PROCEDURE — 99285 EMERGENCY DEPT VISIT HI MDM: CPT | Mod: 25

## 2022-09-08 PROCEDURE — 36415 COLL VENOUS BLD VENIPUNCTURE: CPT | Performed by: INTERNAL MEDICINE

## 2022-09-08 PROCEDURE — 84484 ASSAY OF TROPONIN QUANT: CPT | Performed by: INTERNAL MEDICINE

## 2022-09-08 PROCEDURE — 93005 ELECTROCARDIOGRAM TRACING: CPT

## 2022-09-08 PROCEDURE — 80048 BASIC METABOLIC PNL TOTAL CA: CPT | Performed by: INTERNAL MEDICINE

## 2022-09-09 VITALS
WEIGHT: 315 LBS | TEMPERATURE: 98 F | HEART RATE: 76 BPM | DIASTOLIC BLOOD PRESSURE: 77 MMHG | OXYGEN SATURATION: 99 % | BODY MASS INDEX: 50.62 KG/M2 | RESPIRATION RATE: 18 BRPM | HEIGHT: 66 IN | SYSTOLIC BLOOD PRESSURE: 128 MMHG

## 2022-09-09 RX ORDER — DICLOFENAC SODIUM 50 MG/1
50 TABLET, DELAYED RELEASE ORAL 2 TIMES DAILY PRN
Qty: 20 TABLET | Refills: 0 | OUTPATIENT
Start: 2022-09-09 | End: 2022-09-23

## 2022-09-09 NOTE — ED PROVIDER NOTES
Encounter Date: 9/8/2022       History     Chief Complaint   Patient presents with    Chest Pain     Pt c/o constant rt sided chest pain starting yesterday morning, describes as burning. vss     Presents with chest pain for the last 3 days, states intermittent for few days, constant since today AM. Denies medical problems    The history is provided by the patient.   Chest Pain  The current episode started two days ago. Chest pain occurs intermittently. The chest pain is unchanged. At its most intense, the chest pain is at 5/10. The chest pain is currently at 3/10. The quality of the pain is described as aching and brief. The pain does not radiate. Primary symptoms include shortness of breath and palpitations. Pertinent negatives for primary symptoms include no fever, no cough, no nausea, no vomiting, no dizziness and no altered mental status.   The palpitations also occurred with shortness of breath. The palpitations did not occur with dizziness.   Pertinent negatives for associated symptoms include no weakness. He tried nothing for the symptoms. Risk factors include male gender and obesity.   Pertinent negatives for past medical history include no CAD, no CHF, no diabetes, no MI and no PE.   His family medical history is significant for diabetes.   Pertinent negatives for family medical history include: no CAD.   Review of patient's allergies indicates:  No Known Allergies  No past medical history on file.  No past surgical history on file.  No family history on file.  Social History     Tobacco Use    Smoking status: Never    Smokeless tobacco: Never     Review of Systems   Constitutional:  Negative for fever.   HENT:  Negative for sore throat.    Respiratory:  Positive for shortness of breath. Negative for cough.    Cardiovascular:  Positive for chest pain and palpitations.   Gastrointestinal:  Negative for nausea and vomiting.   Genitourinary:  Negative for dysuria.   Musculoskeletal:  Negative for back pain.    Skin:  Negative for rash.   Neurological:  Negative for dizziness and weakness.   Hematological:  Does not bruise/bleed easily.   All other systems reviewed and are negative.    Physical Exam     Initial Vitals [09/08/22 2103]   BP Pulse Resp Temp SpO2   121/72 66 19 98.4 °F (36.9 °C) 99 %      MAP       --         Physical Exam    Nursing note and vitals reviewed.  Constitutional: He appears well-developed and well-nourished.   HENT:   Head: Normocephalic and atraumatic.   Eyes: Conjunctivae and EOM are normal. Pupils are equal, round, and reactive to light.   Neck: Neck supple.   Normal range of motion.  Cardiovascular:  Regular rhythm, normal heart sounds and intact distal pulses.           Pulmonary/Chest: Breath sounds normal. No respiratory distress.   Abdominal: Abdomen is soft. Bowel sounds are normal. He exhibits no distension. There is no abdominal tenderness. There is no rebound and no guarding.   Musculoskeletal:         General: No edema. Normal range of motion.      Cervical back: Normal range of motion and neck supple.     Neurological: He is alert and oriented to person, place, and time. He has normal strength. GCS score is 15. GCS eye subscore is 4. GCS verbal subscore is 5. GCS motor subscore is 6.   Skin: Skin is warm and dry. No rash noted.   Psychiatric: His behavior is normal.       ED Course   Procedures  Labs Reviewed   BASIC METABOLIC PANEL - Abnormal; Notable for the following components:       Result Value    Chloride 97 (*)     Carbon Dioxide 31 (*)     Glucose Level 105 (*)     All other components within normal limits   TROPONIN I - Normal   D DIMER, QUANTITATIVE - Normal     EKG Readings: (Independently Interpreted)   Initial Reading: No STEMI. Rhythm: Normal Sinus Rhythm. Heart Rate: 66. Ectopy: No Ectopy. Conduction: Normal. ST Segments: Normal ST Segments. T Waves: Normal. Axis: Right Axis Deviation. Clinical Impression: Normal Sinus Rhythm     Imaging Results              X-Ray  Chest PA And Lateral (In process)                   X-Rays:   Independently Interpreted Readings:   Chest X-Ray: Normal heart size.  No acute abnormalities. Retrocardiac opacity   Medications - No data to display                       Clinical Impression:   Final diagnoses:  [R07.9] Chest pain  [R07.89] Atypical chest pain (Primary)        ED Disposition Condition    Discharge Stable          ED Prescriptions       Medication Sig Dispense Start Date End Date Auth. Provider    diclofenac (VOLTAREN) 50 MG EC tablet Take 1 tablet (50 mg total) by mouth 2 (two) times daily as needed (Pain). 20 tablet 9/9/2022 -- Ian La MD          Follow-up Information       Follow up With Specialties Details Why Contact Info Additional Information    [facility] Emergency Dept Emergency Medicine   [address] [address] [phone]     [facility] - Internal Medicine Internal Medicine In 1 month  [address] [address] [phone] Internal Medicine Clinic Entrance #1             Ian La MD  09/09/22 0004       Ian La MD  09/09/22 0004

## 2022-09-23 ENCOUNTER — HOSPITAL ENCOUNTER (EMERGENCY)
Facility: HOSPITAL | Age: 32
Discharge: HOME | End: 2022-09-23
Attending: STUDENT IN AN ORGANIZED HEALTH CARE EDUCATION/TRAINING PROGRAM

## 2022-09-23 VITALS
TEMPERATURE: 98 F | DIASTOLIC BLOOD PRESSURE: 90 MMHG | WEIGHT: 315 LBS | RESPIRATION RATE: 16 BRPM | OXYGEN SATURATION: 100 % | SYSTOLIC BLOOD PRESSURE: 122 MMHG | BODY MASS INDEX: 53.78 KG/M2 | HEART RATE: 70 BPM | HEIGHT: 64 IN

## 2022-09-23 DIAGNOSIS — R07.89 ATYPICAL CHEST PAIN: ICD-10-CM

## 2022-09-23 LAB
ALBUMIN SERPL-MCNC: 3.7 GM/DL (ref 3.5–5)
ALBUMIN/GLOB SERPL: 1 RATIO (ref 1.1–2)
ALP SERPL-CCNC: 103 UNIT/L (ref 40–150)
ALT SERPL-CCNC: 30 UNIT/L (ref 0–55)
AST SERPL-CCNC: 21 UNIT/L (ref 5–34)
BASOPHILS # BLD AUTO: 0.01 X10(3)/MCL (ref 0–0.2)
BASOPHILS NFR BLD AUTO: 0.2 %
BILIRUBIN DIRECT+TOT PNL SERPL-MCNC: 0.6 MG/DL
BNP BLD-MCNC: 51 PG/ML
BUN SERPL-MCNC: 11.3 MG/DL (ref 8.9–20.6)
CALCIUM SERPL-MCNC: 9.1 MG/DL (ref 8.4–10.2)
CHLORIDE SERPL-SCNC: 101 MMOL/L (ref 98–107)
CK MB SERPL-MCNC: 1.6 NG/ML
CK SERPL-CCNC: 94 U/L (ref 30–200)
CO2 SERPL-SCNC: 31 MMOL/L (ref 22–29)
CREAT SERPL-MCNC: 0.77 MG/DL (ref 0.73–1.18)
D DIMER PPP IA.FEU-MCNC: 0.29 UG/ML FEU (ref 0–0.5)
EOSINOPHIL # BLD AUTO: 0.19 X10(3)/MCL (ref 0–0.9)
EOSINOPHIL NFR BLD AUTO: 2.9 %
ERYTHROCYTE [DISTWIDTH] IN BLOOD BY AUTOMATED COUNT: 17 % (ref 11.5–17)
GFR SERPLBLD CREATININE-BSD FMLA CKD-EPI: >60 MLS/MIN/1.73/M2
GLOBULIN SER-MCNC: 3.6 GM/DL (ref 2.4–3.5)
GLUCOSE SERPL-MCNC: 107 MG/DL (ref 74–100)
HCT VFR BLD AUTO: 51.5 % (ref 42–52)
HGB BLD-MCNC: 15.9 GM/DL (ref 14–18)
IMM GRANULOCYTES # BLD AUTO: 0.02 X10(3)/MCL (ref 0–0.04)
IMM GRANULOCYTES NFR BLD AUTO: 0.3 %
LYMPHOCYTES # BLD AUTO: 1.32 X10(3)/MCL (ref 0.6–4.6)
LYMPHOCYTES NFR BLD AUTO: 20.2 %
MCH RBC QN AUTO: 27.1 PG (ref 27–31)
MCHC RBC AUTO-ENTMCNC: 30.9 MG/DL (ref 33–36)
MCV RBC AUTO: 87.7 FL (ref 80–94)
MONOCYTES # BLD AUTO: 0.79 X10(3)/MCL (ref 0.1–1.3)
MONOCYTES NFR BLD AUTO: 12.1 %
NEUTROPHILS # BLD AUTO: 4.2 X10(3)/MCL (ref 2.1–9.2)
NEUTROPHILS NFR BLD AUTO: 64.3 %
NRBC BLD AUTO-RTO: 0 %
PLATELET # BLD AUTO: 189 X10(3)/MCL (ref 130–400)
PMV BLD AUTO: 10.8 FL (ref 7.4–10.4)
POTASSIUM SERPL-SCNC: 3.7 MMOL/L (ref 3.5–5.1)
PROT SERPL-MCNC: 7.3 GM/DL (ref 6.4–8.3)
RBC # BLD AUTO: 5.87 X10(6)/MCL (ref 4.7–6.1)
SODIUM SERPL-SCNC: 140 MMOL/L (ref 136–145)
TROPONIN I SERPL-MCNC: <0.01 NG/ML (ref 0–0.04)
WBC # SPEC AUTO: 6.5 X10(3)/MCL (ref 4.5–11.5)

## 2022-09-23 PROCEDURE — 85025 COMPLETE CBC W/AUTO DIFF WBC: CPT | Performed by: PHYSICIAN ASSISTANT

## 2022-09-23 PROCEDURE — 84484 ASSAY OF TROPONIN QUANT: CPT | Performed by: PHYSICIAN ASSISTANT

## 2022-09-23 PROCEDURE — 82553 CREATINE MB FRACTION: CPT | Performed by: PHYSICIAN ASSISTANT

## 2022-09-23 PROCEDURE — 93010 ELECTROCARDIOGRAM REPORT: CPT | Mod: ,,, | Performed by: STUDENT IN AN ORGANIZED HEALTH CARE EDUCATION/TRAINING PROGRAM

## 2022-09-23 PROCEDURE — 93005 ELECTROCARDIOGRAM TRACING: CPT

## 2022-09-23 PROCEDURE — 80053 COMPREHEN METABOLIC PANEL: CPT | Performed by: PHYSICIAN ASSISTANT

## 2022-09-23 PROCEDURE — 82550 ASSAY OF CK (CPK): CPT | Performed by: PHYSICIAN ASSISTANT

## 2022-09-23 PROCEDURE — 99285 EMERGENCY DEPT VISIT HI MDM: CPT | Mod: 25

## 2022-09-23 PROCEDURE — 83880 ASSAY OF NATRIURETIC PEPTIDE: CPT | Performed by: PHYSICIAN ASSISTANT

## 2022-09-23 PROCEDURE — 93010 EKG 12-LEAD: ICD-10-PCS | Mod: ,,, | Performed by: STUDENT IN AN ORGANIZED HEALTH CARE EDUCATION/TRAINING PROGRAM

## 2022-09-23 PROCEDURE — 36415 COLL VENOUS BLD VENIPUNCTURE: CPT | Performed by: PHYSICIAN ASSISTANT

## 2022-09-23 PROCEDURE — 85379 FIBRIN DEGRADATION QUANT: CPT | Performed by: PHYSICIAN ASSISTANT

## 2022-09-23 RX ORDER — METHOCARBAMOL 500 MG/1
500 TABLET, FILM COATED ORAL 4 TIMES DAILY
Qty: 20 TABLET | Refills: 0 | Status: SHIPPED | OUTPATIENT
Start: 2022-09-23 | End: 2022-09-28

## 2022-09-23 RX ORDER — INDOMETHACIN 25 MG/1
25 CAPSULE ORAL
Qty: 15 CAPSULE | Refills: 0 | Status: SHIPPED | OUTPATIENT
Start: 2022-09-23 | End: 2022-09-28

## 2022-09-23 NOTE — ED PROVIDER NOTES
Encounter Date: 9/23/2022       History     Chief Complaint   Patient presents with    Chest Pain    Shortness of Breath     Patient presents to the ER with complaints of right sided chest pain and sob x 5 days; pt denies fever or sick contacts; pt reports the pain occurs intermittently    The history is provided by the patient.   Chest Pain  The current episode started several days ago. Duration of episode(s) is 5 days. Chest pain occurs intermittently. The chest pain is unchanged. At its most intense, the chest pain is at 5/10. The chest pain is currently at 2/10. The quality of the pain is described as aching. The pain does not radiate. Primary symptoms include shortness of breath. Pertinent negatives for primary symptoms include no fever, no cough, no abdominal pain, no nausea, no vomiting and no altered mental status.   The shortness of breath began  more than 2 days ago. The patient's medical history does not include CHF, COPD or asthma.   Pertinent negatives for associated symptoms include no weakness. Risk factors include male gender and obesity.   His past medical history is significant for diabetes.   Pertinent negatives for past medical history include no CAD, no CHF, no DVT, no MI, no PE, no recent injury, no stimulant use and no strokes.   Review of patient's allergies indicates:  No Known Allergies  No past medical history on file.  No past surgical history on file.  No family history on file.  Social History     Tobacco Use    Smoking status: Never    Smokeless tobacco: Never     Review of Systems   Constitutional:  Negative for fever.   HENT:  Negative for sore throat.    Respiratory:  Positive for shortness of breath. Negative for cough.    Cardiovascular:  Positive for chest pain.   Gastrointestinal:  Negative for abdominal pain, nausea and vomiting.   Genitourinary:  Negative for dysuria.   Musculoskeletal:  Negative for back pain.   Skin:  Negative for rash.   Neurological:  Negative for  weakness.   Hematological:  Does not bruise/bleed easily.   Psychiatric/Behavioral: Negative.       Physical Exam     Initial Vitals [09/23/22 1620]   BP Pulse Resp Temp SpO2   132/85 65 18 98.4 °F (36.9 °C) 98 %      MAP       --         Physical Exam    Vitals reviewed.  Constitutional: He appears well-developed.   HENT:   Head: Normocephalic and atraumatic.   Eyes: Conjunctivae and EOM are normal. Pupils are equal, round, and reactive to light.   Neck:   Normal range of motion.  Cardiovascular:  Normal rate, regular rhythm and normal heart sounds.           Pulmonary/Chest: Breath sounds normal. He exhibits no tenderness.   Abdominal: Abdomen is soft. Bowel sounds are normal. He exhibits no distension. There is no abdominal tenderness.   Musculoskeletal:         General: Normal range of motion.      Cervical back: Normal range of motion.     Neurological: He is alert and oriented to person, place, and time. He displays normal reflexes. No cranial nerve deficit or sensory deficit. GCS score is 15. GCS eye subscore is 4. GCS verbal subscore is 5. GCS motor subscore is 6.   Skin: Skin is warm. No pallor.   Psychiatric: He has a normal mood and affect. His behavior is normal. Judgment and thought content normal.       ED Course   Procedures  Labs Reviewed   COMPREHENSIVE METABOLIC PANEL - Abnormal; Notable for the following components:       Result Value    Carbon Dioxide 31 (*)     Glucose Level 107 (*)     Globulin 3.6 (*)     Albumin/Globulin Ratio 1.0 (*)     All other components within normal limits   CBC WITH DIFFERENTIAL - Abnormal; Notable for the following components:    MCHC 30.9 (*)     MPV 10.8 (*)     All other components within normal limits   TROPONIN I - Normal   D DIMER, QUANTITATIVE - Normal   CK - Normal   CK-MB - Normal   B-TYPE NATRIURETIC PEPTIDE - Normal   CBC W/ AUTO DIFFERENTIAL    Narrative:     The following orders were created for panel order CBC auto differential.  Procedure                                Abnormality         Status                     ---------                               -----------         ------                     CBC with Differential[[id number]]        Abnormal            Final result                 Please view results for these tests on the individual orders.   EXTRA TUBES    Narrative:     The following orders were created for panel order EXTRA TUBES.  Procedure                               Abnormality         Status                     ---------                               -----------         ------                     Light Blue Top Hold[[id number]]                                                         Red Top Hold[[id number]]                                     In process                   Please view results for these tests on the individual orders.   RED TOP HOLD        ECG Results              EKG 12-lead (In process)  Result time 09/23/22 16:36:37      In process by Interface, Lab In [facility] (09/23/22 16:36:37)                   Narrative:    Test Reason : R07.9,    Vent. Rate : 079 BPM     Atrial Rate : 079 BPM     P-R Int : 188 ms          QRS Dur : 096 ms      QT Int : 380 ms       P-R-T Axes : 032 148 053 degrees     QTc Int : 435 ms    Normal sinus rhythm  Right axis deviation  Incomplete right bundle branch block  Possible Right ventricular hypertrophy  Abnormal ECG  When compared with ECG of 08-SEP-2022 21:16,  No significant change was found    Referred By: [email or web address]   SELF           Confirmed By:                                   Imaging Results              X-Ray Chest PA And Lateral (Final result)  Result time 09/23/22 17:25:49      Final result by Gonzalez Sapp MD (09/23/22 17:25:49)                   Impression:      Chronic appearing lung changes bilaterally    Cardiomegaly      Electronically signed by: Gonzalez Sapp  Date:    09/23/2022  Time:    17:25               Narrative:    EXAMINATION:  XR CHEST PA AND LATERAL    CLINICAL  HISTORY:  Chest pain, unspecified    TECHNIQUE:  PA and lateral views of the chest were performed.    COMPARISON:  09/08/2022    FINDINGS:  There are chronic appearing lung changes seen bilaterally. No evidence of acute infiltrate is seen. No mass is seen. No lesion is seen. No pleural effusion is seen. The heart appears enlarged in size.  The aorta appears normal. The bones and joints show no acute abnormality.                                       Medications - No data to display        APC / Resident Notes:    I was not physically present during the history or exam of this patient. I was available at all times for consultation. (Mary Anne)                   Clinical Impression:   Final diagnoses:  [R07.89] Atypical chest pain        ED Disposition Condition    Discharge Stable          ED Prescriptions       Medication Sig Dispense Start Date End Date Auth. Provider    indomethacin (INDOCIN) 25 MG capsule Take 1 capsule (25 mg total) by mouth 3 (three) times daily with meals. for 5 days 15 capsule 9/23/2022 9/28/2022 LIZETT Orta    methocarbamoL (ROBAXIN) 500 MG Tab Take 1 tablet (500 mg total) by mouth 4 (four) times daily. for 5 days 20 tablet 9/23/2022 9/28/2022 LIZETT Orta          Follow-up Information       Follow up With Specialties Details Why Contact Info    discharge followup    If your symptoms become WORSE or you DO NOT IMPROVE and you are unable to reach your health care provider, you should RETURN to the emergency department    discharge info    Discussed all pertinent ED information, results, diagnosis and treatment plan; All questions and concerns were addressed at this time. Patient voices understanding of information and instructions. Patient is comfortable with plan and discharge             LIZETT Orta  09/23/22 1840       Abdulkadir North MD  09/24/22 8581

## 2022-11-01 NOTE — PROGRESS NOTES
Sindi L Alexus, NP   [facility]  [address] [address]      PATIENT NAME: Tawanda Izaguirre  : 1990  DATE: 22  MRN: [id number]      Billing Provider: Sindi Vaughan NP  Level of Service: CT PREVENTIVE VISIT,NEW,18-39  Patient PCP Information       Provider PCP Type    Sindi Vaughan NP General            Reason for Visit / Chief Complaint: Establish Care, atypical chest pain, Gastroesophageal Reflux, and Rectal Bleeding       History of Present Illness / Problem Focused Workflow     Tawanda Izaguirre presents to the clinic with Establish Care, atypical chest pain, Gastroesophageal Reflux, and Rectal Bleeding     31 yo  male to establish PCP care. He denies past medical history. His primary concern today is right sided chest pain. Describes as burning. Intermittent. Unable to identify alleviating/ triggering factors. Will last a few seconds- minutes. Ongoing for a while. Was seen in ER twice for same with work up and referral to Cardiology. Appointment with cardiologist is next week and he plans to attend. He endorses blood in stool for the last few weeks without accompanying abdominal pain, n/v/d, poor appetite. He does endorse acid reflux and indigestion. Denies treatment. Not on current medications. He was hospitalized in 2022 with elevated A1c 6.6%. Has been out of [facility]. He uses home oxygen at night. He denies prior sleep study. Denies any fever, chills, night sweats, HA, dizziness, sore throat, cough, SOB, hematuria.     Other providers  [facility]- initial visit 11/10/22      Review of Systems     Review of Systems   Constitutional:  Negative for appetite change, chills, fatigue, fever and unexpected weight change.   HENT:  Negative for congestion, ear pain, hearing loss, sinus pressure, sore throat and tinnitus.    Respiratory:  Negative for cough, chest tightness, shortness of breath  "and wheezing.    Cardiovascular:  Positive for chest pain (right sided chest pain). Negative for palpitations and leg swelling.   Gastrointestinal:  Positive for blood in stool. Negative for abdominal distention, abdominal pain, constipation, diarrhea, nausea and vomiting.        Acid reflux   Genitourinary:  Negative for dysuria and hematuria.   Musculoskeletal:  Negative for arthralgias and myalgias.   Skin:  Negative for pallor.   Allergic/Immunologic: Negative for environmental allergies.   Neurological:  Negative for dizziness, syncope, weakness, numbness and headaches.   Hematological:  Negative for adenopathy. Does not bruise/bleed easily.   Psychiatric/Behavioral:  Negative for agitation, behavioral problems, confusion, hallucinations, sleep disturbance and suicidal ideas. The patient is not nervous/anxious.      Medical / Social / Family History   History reviewed. No pertinent past medical history.    Past Surgical History:   Procedure Laterality Date    denies         Social History  Mr. Neff  reports that he has never smoked. He has never used smokeless tobacco. He reports that he does not currently use alcohol. He reports that he does not use drugs.    Family History  Mr. Neff' family history includes Diabetes Mellitus in his mother; No Known Problems in his father.    Medications and Allergies     Medications  Medication List with Changes/Refills   New Medications    OMEPRAZOLE (PRILOSEC) 20 MG CAPSULE    Take 1 capsule (20 mg total) by mouth once daily.   Current Medications    LIRAGLUTIDE 0.6 MG/0.1 ML, 18 MG/3 ML, SUBQ PNIJ (VICTOZA 2-LOIS) 0.6 MG/0.1 ML (18 MG/3 ML) PNIJ PEN    Inject 0.6 mg into the skin once daily.    MICONAZOLE NITRATE 2 % (MICOTIN) 2 % TOP POWDER    Apply topically 2 (two) times daily.    PEN NEEDLE, DIABETIC 32 GAUGE X 5/16" NDLE    1 each by Misc.(Non-Drug; Combo Route) route once daily.    TECHLITE PEN NEEDLE 32 GAUGE X 5/32" NDLE    USE 1 new pen needle FOR EACH " injection       Allergies  Review of patient's allergies indicates:  No Known Allergies    Physical Examination     Vitals:    11/02/22 0808   BP: 103/71   Pulse: 83   Resp: 20   Temp: 98.5 °F (36.9 °C)     Physical Exam  Vitals and nursing note reviewed.   Constitutional:       Appearance: Normal appearance. He is not ill-appearing.   HENT:      Head: Normocephalic.      Right Ear: Tympanic membrane normal.      Left Ear: Tympanic membrane normal.      Nose: Nose normal.      Mouth/Throat:      Mouth: Mucous membranes are moist.   Eyes:      Extraocular Movements: Extraocular movements intact.      Conjunctiva/sclera: Conjunctivae normal.      Pupils: Pupils are equal, round, and reactive to light.   Neck:      Vascular: No carotid bruit.      Comments: enlarged  Cardiovascular:      Rate and Rhythm: Normal rate and regular rhythm.      Pulses: Normal pulses.      Heart sounds: No murmur heard.  Pulmonary:      Effort: Pulmonary effort is normal. No respiratory distress.      Breath sounds: Normal breath sounds.   Abdominal:      General: Bowel sounds are normal. There is no distension.      Palpations: Abdomen is soft. There is no mass.      Tenderness: There is no abdominal tenderness.      Hernia: No hernia is present.   Musculoskeletal:         General: Normal range of motion.      Cervical back: Normal range of motion and neck supple.      Right lower leg: No edema.      Left lower leg: No edema.   Lymphadenopathy:      Cervical: No cervical adenopathy.   Skin:     General: Skin is warm and dry.      Capillary Refill: Capillary refill takes less than 2 seconds.   Neurological:      Mental Status: He is alert and oriented to person, place, and time. Mental status is at baseline.      Motor: No weakness.   Psychiatric:         Mood and Affect: Mood normal.         Behavior: Behavior normal.         Thought Content: Thought content normal.         Judgment: Judgment normal.         Results     Lab Results    Component Value Date    WBC 6.5 09/23/2022    RBC 5.87 09/23/2022    HGB 15.9 09/23/2022    HCT 51.5 09/23/2022    MCV 87.7 09/23/2022    MCH 27.1 09/23/2022    MCHC 30.9 (L) 09/23/2022    RDW 17.0 09/23/2022     09/23/2022    MPV 10.8 (H) 09/23/2022     CMP  Sodium Level   Date Value Ref Range Status   09/23/2022 140 136 - 145 mmol/L Final     Potassium Level   Date Value Ref Range Status   09/23/2022 3.7 3.5 - 5.1 mmol/L Final     Carbon Dioxide   Date Value Ref Range Status   09/23/2022 31 (H) 22 - 29 mmol/L Final     Blood Urea Nitrogen   Date Value Ref Range Status   09/23/2022 11.3 8.9 - 20.6 mg/dL Final     Creatinine   Date Value Ref Range Status   09/23/2022 0.77 0.73 - 1.18 mg/dL Final     Calcium Level Total   Date Value Ref Range Status   09/23/2022 9.1 8.4 - 10.2 mg/dL Final     Albumin Level   Date Value Ref Range Status   09/23/2022 3.7 3.5 - 5.0 gm/dL Final     Bilirubin Total   Date Value Ref Range Status   09/23/2022 0.6 <=1.5 mg/dL Final     Alkaline Phosphatase   Date Value Ref Range Status   09/23/2022 103 40 - 150 unit/L Final     Aspartate Aminotransferase   Date Value Ref Range Status   09/23/2022 21 5 - 34 unit/L Final     Alanine Aminotransferase   Date Value Ref Range Status   09/23/2022 30 0 - 55 unit/L Final     Estimated GFR-Non    Date Value Ref Range Status   08/02/2022 >60 mls/min/1.73/m2 Final     Lab Results   Component Value Date    CHOL 83 07/30/2022     Lab Results   Component Value Date    HDL 15 (L) 07/30/2022     No results found for: LDLCALC  Lab Results   Component Value Date    TRIG 103 07/30/2022     No results found for: CHOLHDL  Lab Results   Component Value Date    TSH 3.2731 07/30/2022     Lab Results   Component Value Date    PROTEINUA Trace (A) 07/29/2022    LEUKOCYTESUR Negative 07/29/2022           Assessment and Plan (including Health Maintenance)     Plan:         Health Maintenance Due   Topic Date Due    Hepatitis C Screening  Never  done    COVID-19 Vaccine (1) Never done    Pneumococcal Vaccines (Age 0-64) (1 - PCV) Never done    TETANUS VACCINE  Never done    Influenza Vaccine (1) Never done       Problem List Items Addressed This Visit          Pulmonary    On home oxygen therapy    Current Assessment & Plan     Hospitalized August 2022 with diagnosis of obesity hypoventilation syndrome and discharged with home oxygen with use at night         Pulmonary nodules    Current Assessment & Plan     Patient hospitalized in August 2022 with CT thorax with scattered benign appearing nodules   Negative tobacco history; negative family history of lung cancer            Endocrine    Morbid obesity    Current Assessment & Plan     BMI 59.49  Educated on increased risk of disease s/t obesity.  Educated on health benefits of at least 5 days/ week of 30 minutes moderate intensity exercise (brisk walking) and 2 or more days/ week of muscle strength activities (as tolerated).  Eat well balanced diet of fresh fruits/ vegetables, whole grains, lean meats and limit high carbohydrate foods.            Prediabetes    Current Assessment & Plan     August 2022 A1c 6.6%- patient was on Victoza at discharge from hospitalization but has not been taking  Recheck level today          Relevant Orders    Hemoglobin A1C       GI    Blood in stool    Current Assessment & Plan     Reports a few episodes of blood in stools in the last few weeks  Recent CBC with stable H/H   XR abdomen, FIT ordered and will complete today         Relevant Orders    OCCULT BLOOD FECAL IMMUNOASSAY    X-Ray Abdomen Flat And Erect    Gastroesophageal reflux disease without esophagitis    Current Assessment & Plan     C/o acid reflux and indigestion  GERD diet and precautions discussed such as:  Avoid tobacco/ alcohol, NSAID use; Avoid spicy/ acidic foods, tomato sauce, jose, caffeine, chocolate, onions  Eat smaller meals; keep HOB elevated at least 2 hours after eating  Begin omeprazole 20 mg           Relevant Medications    omeprazole (PRILOSEC) 20 MG capsule    Hepatic steatosis    Current Assessment & Plan     Patient hospitalized August 2022- CT abd/pelvis reviewed with hepatic steatosis/ enlarged liver noted  Low fat/ chol diet, regular exercise/lower BMI, limit tylenol/ alcohol  Will need annual US liver         Relevant Orders    Urinalysis       Other    Atypical chest pain    Current Assessment & Plan     He reports right sided chest pain described as burning pain. Intermittent. Lasts a few minutes usually. Unable to identify triggers/ relieving factors.   Recent CXR reviewed from ER visits and has upcoming Cardiology appt next week which he plans to attend 11/10/22         Obesity hypoventilation syndrome    Current Assessment & Plan     Diagnosed with obesity hypoventilation syndrome during hospitalization August 2022 and discharged on home oxygen therapy which he admits to using at night only          Other Visit Diagnoses       Wellness examination    -  Primary  Avoid tobacco/ alcohol/ drugs  Regular exercise as tolerated  Healthy lifestyle habits      Relevant Orders    Urinalysis    Screening examination for infectious disease        Relevant Orders    Hepatitis Panel, Acute            The patient has no Health Maintenance topics of status Not Due    Future Appointments   Date Time Provider Department Center   11/10/2022 12:45 PM FATIMAH Montes [facility]   11/16/2022  7:15 AM Sindi L Alexus, NP [facility] [place] [facility]        Follow up in 2 weeks.    Signature:  Sindi Vaughan NP  [facility]  [address] [address]    Date of encounter: 11/2/22

## 2022-11-02 ENCOUNTER — OFFICE VISIT (OUTPATIENT)
Dept: INTERNAL MEDICINE | Facility: CLINIC | Age: 32
End: 2022-11-02

## 2022-11-02 ENCOUNTER — HOSPITAL ENCOUNTER (OUTPATIENT)
Dept: RADIOLOGY | Facility: HOSPITAL | Age: 32
Discharge: HOME | End: 2022-11-02
Attending: NURSE PRACTITIONER

## 2022-11-02 ENCOUNTER — TELEPHONE (OUTPATIENT)
Dept: INTERNAL MEDICINE | Facility: CLINIC | Age: 32
End: 2022-11-02

## 2022-11-02 VITALS
TEMPERATURE: 99 F | HEART RATE: 83 BPM | DIASTOLIC BLOOD PRESSURE: 71 MMHG | HEIGHT: 64 IN | BODY MASS INDEX: 53.78 KG/M2 | SYSTOLIC BLOOD PRESSURE: 103 MMHG | RESPIRATION RATE: 20 BRPM | WEIGHT: 315 LBS

## 2022-11-02 DIAGNOSIS — R07.89 ATYPICAL CHEST PAIN: ICD-10-CM

## 2022-11-02 DIAGNOSIS — Z99.81 ON HOME OXYGEN THERAPY: ICD-10-CM

## 2022-11-02 DIAGNOSIS — E66.2 OBESITY HYPOVENTILATION SYNDROME: ICD-10-CM

## 2022-11-02 DIAGNOSIS — R91.8 PULMONARY NODULES: ICD-10-CM

## 2022-11-02 DIAGNOSIS — E66.01 MORBID OBESITY: ICD-10-CM

## 2022-11-02 DIAGNOSIS — Z11.9 SCREENING EXAMINATION FOR INFECTIOUS DISEASE: ICD-10-CM

## 2022-11-02 DIAGNOSIS — K21.9 GASTROESOPHAGEAL REFLUX DISEASE WITHOUT ESOPHAGITIS: ICD-10-CM

## 2022-11-02 DIAGNOSIS — K92.1 BLOOD IN STOOL: ICD-10-CM

## 2022-11-02 DIAGNOSIS — K76.0 HEPATIC STEATOSIS: ICD-10-CM

## 2022-11-02 DIAGNOSIS — Z00.00 WELLNESS EXAMINATION: Primary | ICD-10-CM

## 2022-11-02 DIAGNOSIS — R73.03 PREDIABETES: ICD-10-CM

## 2022-11-02 PROCEDURE — 99214 OFFICE O/P EST MOD 30 MIN: CPT | Mod: PBBFAC | Performed by: NURSE PRACTITIONER

## 2022-11-02 PROCEDURE — 99385 PR PREVENTIVE VISIT,NEW,18-39: ICD-10-PCS | Mod: S$PBB,,, | Performed by: NURSE PRACTITIONER

## 2022-11-02 PROCEDURE — 99212 PR OFFICE/OUTPT VISIT, EST, LEVL II, 10-19 MIN: ICD-10-PCS | Mod: 25,S$PBB,, | Performed by: NURSE PRACTITIONER

## 2022-11-02 PROCEDURE — 99212 OFFICE O/P EST SF 10 MIN: CPT | Mod: 25,S$PBB,, | Performed by: NURSE PRACTITIONER

## 2022-11-02 PROCEDURE — 99385 PREV VISIT NEW AGE 18-39: CPT | Mod: S$PBB,,, | Performed by: NURSE PRACTITIONER

## 2022-11-02 PROCEDURE — 74019 RADEX ABDOMEN 2 VIEWS: CPT | Mod: TC

## 2022-11-02 RX ORDER — OMEPRAZOLE 20 MG/1
20 CAPSULE, DELAYED RELEASE ORAL DAILY
Qty: 30 CAPSULE | Refills: 2 | Status: SHIPPED | OUTPATIENT
Start: 2022-11-02 | End: 2022-11-16 | Stop reason: SDUPTHER

## 2022-11-02 RX ORDER — PEN NEEDLE, DIABETIC 32GX 5/32"
NEEDLE, DISPOSABLE MISCELLANEOUS
COMMUNITY
Start: 2022-08-02 | End: 2022-11-16 | Stop reason: SDUPTHER

## 2022-11-02 NOTE — ASSESSMENT & PLAN NOTE
BMI 59.49  Educated on increased risk of disease s/t obesity.  Educated on health benefits of at least 5 days/ week of 30 minutes moderate intensity exercise (brisk walking) and 2 or more days/ week of muscle strength activities (as tolerated).  Eat well balanced diet of fresh fruits/ vegetables, whole grains, lean meats and limit high carbohydrate foods.

## 2022-11-02 NOTE — ASSESSMENT & PLAN NOTE
Patient hospitalized August 2022- CT abd/pelvis reviewed with hepatic steatosis/ enlarged liver noted  Low fat/ chol diet, regular exercise/lower BMI, limit tylenol/ alcohol  Will need annual US liver

## 2022-11-02 NOTE — ASSESSMENT & PLAN NOTE
Reports a few episodes of blood in stools in the last few weeks  Recent CBC with stable H/H   XR abdomen, FIT ordered and will complete today

## 2022-11-02 NOTE — ASSESSMENT & PLAN NOTE
Patient hospitalized in August 2022 with CT thorax with scattered benign appearing nodules   Negative tobacco history; negative family history of lung cancer

## 2022-11-02 NOTE — TELEPHONE ENCOUNTER
Please let patient know abdominal XR was normal. Labs look good. Please remind him to complete stool test if he has not done so already.     Thank you

## 2022-11-02 NOTE — ASSESSMENT & PLAN NOTE
He reports right sided chest pain described as burning pain. Intermittent. Lasts a few minutes usually. Unable to identify triggers/ relieving factors.   Recent CXR reviewed from ER visits and has upcoming Cardiology appt next week which he plans to attend 11/10/22

## 2022-11-02 NOTE — ASSESSMENT & PLAN NOTE
C/o acid reflux and indigestion  GERD diet and precautions discussed such as:  Avoid tobacco/ alcohol, NSAID use; Avoid spicy/ acidic foods, tomato sauce, jose, caffeine, chocolate, onions  Eat smaller meals; keep HOB elevated at least 2 hours after eating  Begin omeprazole 20 mg

## 2022-11-02 NOTE — ASSESSMENT & PLAN NOTE
August 2022 A1c 6.6%- patient was on Victoza at discharge from hospitalization but has not been taking  Recheck level today

## 2022-11-02 NOTE — ASSESSMENT & PLAN NOTE
Diagnosed with obesity hypoventilation syndrome during hospitalization August 2022 and discharged on home oxygen therapy which he admits to using at night only

## 2022-11-02 NOTE — ASSESSMENT & PLAN NOTE
Hospitalized August 2022 with diagnosis of obesity hypoventilation syndrome and discharged with home oxygen with use at night

## 2022-11-10 ENCOUNTER — OFFICE VISIT (OUTPATIENT)
Dept: CARDIOLOGY | Facility: CLINIC | Age: 32
End: 2022-11-10

## 2022-11-10 VITALS
DIASTOLIC BLOOD PRESSURE: 71 MMHG | RESPIRATION RATE: 18 BRPM | BODY MASS INDEX: 61.84 KG/M2 | WEIGHT: 315 LBS | TEMPERATURE: 98 F | OXYGEN SATURATION: 100 % | HEIGHT: 60 IN | HEART RATE: 76 BPM | SYSTOLIC BLOOD PRESSURE: 133 MMHG

## 2022-11-10 DIAGNOSIS — K21.9 GASTROESOPHAGEAL REFLUX DISEASE WITHOUT ESOPHAGITIS: ICD-10-CM

## 2022-11-10 DIAGNOSIS — R06.02 SOB (SHORTNESS OF BREATH): ICD-10-CM

## 2022-11-10 DIAGNOSIS — R07.89 OTHER CHEST PAIN: Primary | ICD-10-CM

## 2022-11-10 DIAGNOSIS — E66.01 MORBID OBESITY: ICD-10-CM

## 2022-11-10 DIAGNOSIS — E66.2 OBESITY HYPOVENTILATION SYNDROME: ICD-10-CM

## 2022-11-10 PROCEDURE — 93005 ELECTROCARDIOGRAM TRACING: CPT

## 2022-11-10 PROCEDURE — 99214 OFFICE O/P EST MOD 30 MIN: CPT | Mod: PBBFAC | Performed by: NURSE PRACTITIONER

## 2022-11-10 PROCEDURE — 99204 PR OFFICE/OUTPT VISIT, NEW, LEVL IV, 45-59 MIN: ICD-10-PCS | Mod: S$PBB,,, | Performed by: NURSE PRACTITIONER

## 2022-11-10 PROCEDURE — 99204 OFFICE O/P NEW MOD 45 MIN: CPT | Mod: S$PBB,,, | Performed by: NURSE PRACTITIONER

## 2022-11-10 NOTE — PROGRESS NOTES
"CHIEF COMPLAINT:   Chief Complaint   Patient presents with    New pt referral to eval/tx chest pain.     Pt c/o sharp chest while at rest and exercise lasting up to 1 hour x 2 months                     Review of patient's allergies indicates:  No Known Allergies                                       HPI:  Tawanda Izaguirre 32 y.o. male with a past medical history of chest pain, obesity hypoventilation syndrome (on home oxygen nightly), pulmonary nodules, morbid obesity, DM2, GERD, and hepatic steatosis who was referred to Cardiology Clinic for chest pain after 2 ER visits in September 2022. Patient had 2 ER visits September 2022 which revealed negative troponins and EKG changes of right axis deviation, possible right ventricular hypertrophy, and incomplete RBBB. Echocardiogram completed on 8.1.22 with EF of 55%, mild right ventricular enlargement, and intermediate central venous pressure (8mmHg).                                                                                                                                                                                      Patient complains of right sided chest pain for the past 2 months which occurs with rest and exertion. Patient describes it as a "sharp" pain and is relieved on its own. Patient does endorse associated SOB. Patient states he experience palpitations several years ago, but none recently. Patient denies orthopnea, PND, and lower extremity edema. Patient denies family history of early CAD. Patient states he is active at work where he is required to walk frequently.                                                                                                                                                                                                                                                                                                   CARDIAC TESTING:  Echo 8.1.22  The estimated ejection fraction is 55%.  Normal systolic " "function.  Mild right ventricular enlargement with normal right ventricular systolic function.  Intermediate central venous pressure (8 mmHg).  The estimated PA systolic pressure is 28 mmHg.  IVC is dilated and collapses >50% with inspiration.  There is no pulmonary hypertension.    Patient Active Problem List   Diagnosis    SOB (shortness of breath)    Obesity hypoventilation syndrome    Hepatic steatosis    Atypical chest pain    Morbid obesity    Pulmonary nodules    Blood in stool    Gastroesophageal reflux disease without esophagitis    Prediabetes    On home oxygen therapy     Past Surgical History:   Procedure Laterality Date    denies       Social History     Socioeconomic History    Marital status: Single   Tobacco Use    Smoking status: Never    Smokeless tobacco: Never   Substance and Sexual Activity    Alcohol use: Not Currently     Comment: quit drinking 5 years ago    Drug use: Never     Social Determinants of Health     Physical Activity: Inactive    Days of Exercise per Week: 0 days    Minutes of Exercise per Session: 0 min        Family History   Problem Relation Age of Onset    Diabetes Mellitus Mother     No Known Problems Father        Current Outpatient Medications:     omeprazole (PRILOSEC) 20 MG capsule, Take 1 capsule (20 mg total) by mouth once daily., Disp: 30 capsule, Rfl: 2    liraglutide 0.6 mg/0.1 mL, 18 mg/3 mL, subq PNIJ (VICTOZA 2-LOIS) 0.6 mg/0.1 mL (18 mg/3 mL) PnIj pen, Inject 0.6 mg into the skin once daily. (Patient not taking: Reported on 11/10/2022), Disp: 3 mL, Rfl: 1    miconazole NITRATE 2 % (MICOTIN) 2 % top powder, Apply topically 2 (two) times daily. (Patient not taking: Reported on 11/2/2022), Disp: 85 g, Rfl: 0    pen needle, diabetic 32 gauge x 5/16" Ndle, 1 each by Misc.(Non-Drug; Combo Route) route once daily. (Patient not taking: Reported on 11/2/2022), Disp: 30 each, Rfl: 1    TECHLITE PEN NEEDLE 32 gauge x 5/32" Ndle, USE 1 new pen needle FOR EACH injection, Disp: " , Rfl:      ROS:                                                                                                                                                                             Review of Systems   Constitutional: Negative.    Respiratory:  Positive for shortness of breath.    Cardiovascular:  Positive for chest pain.   Gastrointestinal: Negative.    Musculoskeletal: Negative.    Skin: Negative.    Neurological: Negative.    Psychiatric/Behavioral: Negative.        Blood pressure 133/71, pulse 76, temperature 98.2 °F (36.8 °C), resp. rate 18, height 5' (1.524 m), weight (!) 156 kg (343 lb 14.7 oz), SpO2 100 %.     PE:  Physical Exam  Constitutional:       Appearance: Normal appearance.   HENT:      Head: Normocephalic.   Eyes:      Extraocular Movements: Extraocular movements intact.   Cardiovascular:      Rate and Rhythm: Normal rate and regular rhythm.   Pulmonary:      Effort: Pulmonary effort is normal.   Abdominal:      Palpations: Abdomen is soft.   Musculoskeletal:         General: Normal range of motion.      Cervical back: Neck supple.   Skin:     General: Skin is warm and dry.   Neurological:      General: No focal deficit present.      Mental Status: He is alert and oriented to person, place, and time.   Psychiatric:         Mood and Affect: Mood normal.      ASSESSMENT/PLAN:  Chest pain  Patient reports episodes of chest pain for the past 2 months exertional and at rest  Patient had 2 ER visits September 2022 which revealed negative troponins and EKG changes on right axis deviation, possible right ventricular hypertrophy, and incomplete RBBB  Echocardiogram completed on 8.1.22 with EF of 55%, mild right ventricular enlargement, and intermediate central venous pressure (8mmHg)  Order a Treadmill Stress Test   Encourage heart healthy/low fat/low cholesterol diet and increase exercise as tolerated   Will get EKG today due to complaints of chest pain in clinic    Obesity hypoventilation syndrome  (on home oxygen nightly)  Continue nightly oxygen   Management per PCP     Pulmonary nodules  Management per PCP     Morbid obesity  Encourage heart healthy diet and increase exercise as tolerated     DM2  A1c 6.2%  Management per PCP   Encourage heart healthy/ADA diet and increase exercise as tolerated     GERD  Management per PCP     Hepatic steatosis  Management per PCP     EKG today   Order a Treadmill Stress Test    Follow up in Cardiology Clinic in 3 months  Follow up with PCP as directed

## 2022-11-10 NOTE — PATIENT INSTRUCTIONS
EKG today   Order a Treadmill Stress Test    Follow up in Cardiology Clinic in 3 months  Follow up with PCP as directed   
No

## 2022-11-16 ENCOUNTER — OFFICE VISIT (OUTPATIENT)
Dept: INTERNAL MEDICINE | Facility: CLINIC | Age: 32
End: 2022-11-16

## 2022-11-16 ENCOUNTER — CLINICAL SUPPORT (OUTPATIENT)
Dept: INTERNAL MEDICINE | Facility: CLINIC | Age: 32
End: 2022-11-16
Attending: NURSE PRACTITIONER

## 2022-11-16 ENCOUNTER — TELEPHONE (OUTPATIENT)
Dept: INTERNAL MEDICINE | Facility: CLINIC | Age: 32
End: 2022-11-16

## 2022-11-16 VITALS
HEIGHT: 60 IN | BODY MASS INDEX: 61.84 KG/M2 | DIASTOLIC BLOOD PRESSURE: 74 MMHG | TEMPERATURE: 98 F | RESPIRATION RATE: 20 BRPM | WEIGHT: 315 LBS | SYSTOLIC BLOOD PRESSURE: 110 MMHG | HEART RATE: 80 BPM

## 2022-11-16 DIAGNOSIS — R59.0 LOCALIZED ENLARGED LYMPH NODES: ICD-10-CM

## 2022-11-16 DIAGNOSIS — E66.01 MORBID OBESITY: ICD-10-CM

## 2022-11-16 DIAGNOSIS — Z99.81 ON HOME OXYGEN THERAPY: ICD-10-CM

## 2022-11-16 DIAGNOSIS — K21.9 GASTROESOPHAGEAL REFLUX DISEASE WITHOUT ESOPHAGITIS: ICD-10-CM

## 2022-11-16 DIAGNOSIS — B35.1 ONYCHOMYCOSIS: ICD-10-CM

## 2022-11-16 DIAGNOSIS — E11.9 TYPE 2 DIABETES MELLITUS WITHOUT COMPLICATION, WITHOUT LONG-TERM CURRENT USE OF INSULIN: ICD-10-CM

## 2022-11-16 DIAGNOSIS — E11.9 TYPE 2 DIABETES MELLITUS WITHOUT COMPLICATION, WITHOUT LONG-TERM CURRENT USE OF INSULIN: Primary | ICD-10-CM

## 2022-11-16 DIAGNOSIS — R91.8 PULMONARY NODULES: ICD-10-CM

## 2022-11-16 DIAGNOSIS — K92.1 BLOOD IN STOOL: ICD-10-CM

## 2022-11-16 DIAGNOSIS — E66.2 OBESITY HYPOVENTILATION SYNDROME: ICD-10-CM

## 2022-11-16 DIAGNOSIS — R59.0 MEDIASTINAL LYMPHADENOPATHY: ICD-10-CM

## 2022-11-16 PROBLEM — R73.03 PREDIABETES: Status: RESOLVED | Noted: 2022-11-02 | Resolved: 2022-11-16

## 2022-11-16 PROCEDURE — 92228 IMG RTA DETC/MNTR DS PHY/QHP: CPT | Mod: PBBFAC

## 2022-11-16 PROCEDURE — 99214 PR OFFICE/OUTPT VISIT, EST, LEVL IV, 30-39 MIN: ICD-10-PCS | Mod: S$PBB,,, | Performed by: NURSE PRACTITIONER

## 2022-11-16 PROCEDURE — 99215 OFFICE O/P EST HI 40 MIN: CPT | Mod: PBBFAC | Performed by: NURSE PRACTITIONER

## 2022-11-16 PROCEDURE — 99214 OFFICE O/P EST MOD 30 MIN: CPT | Mod: S$PBB,,, | Performed by: NURSE PRACTITIONER

## 2022-11-16 RX ORDER — OMEPRAZOLE 20 MG/1
20 CAPSULE, DELAYED RELEASE ORAL DAILY
Qty: 30 CAPSULE | Refills: 6 | Status: SHIPPED | OUTPATIENT
Start: 2022-11-16 | End: 2023-05-16

## 2022-11-16 RX ORDER — LANCETS
EACH MISCELLANEOUS
Qty: 100 EACH | Refills: 3 | Status: SHIPPED | OUTPATIENT
Start: 2022-11-16

## 2022-11-16 RX ORDER — INSULIN PUMP SYRINGE, 3 ML
EACH MISCELLANEOUS
Qty: 1 EACH | Refills: 0 | Status: SHIPPED | OUTPATIENT
Start: 2022-11-16

## 2022-11-16 RX ORDER — TERBINAFINE HYDROCHLORIDE 250 MG/1
250 TABLET ORAL DAILY
Qty: 30 TABLET | Refills: 3 | Status: SHIPPED | OUTPATIENT
Start: 2022-11-16 | End: 2022-12-16

## 2022-11-16 RX ORDER — PEN NEEDLE, DIABETIC 32GX 5/32"
NEEDLE, DISPOSABLE MISCELLANEOUS
Qty: 100 EACH | Refills: 3 | Status: SHIPPED | OUTPATIENT
Start: 2022-11-16

## 2022-11-16 NOTE — ASSESSMENT & PLAN NOTE
7/31/22 CT chest  FINDINGS:  Images were reviewed in soft tissue, lung, and bone windows.     Exam quality: Limited secondary to extensive respiratory motion throughout image acquisition, with resulting artifact.     Lines/tubes: none visualized     Pulmonary Vessels: No central or large segmental filling defect.     Cardiovascular: No suggestion of right heart strain; the RV/LV ratio is <1. No significant heart chamber enlargement. There is no pericardial effusion. No focal contour irregularity or intraluminal abnormality is appreciated involving the visualized aorta and branch vessels.     Lungs/Pleura: Central airways are patent. No organized airspace consolidation or other convincing acute pulmonary process is identified.  There is no aggressive appearing lung lesion.  Scattered punctate, benign-appearing nodules are present, for example lateral subpleural left upper lobe 2 mm nodule (series 13, image 41) and calcified anterior left apical granuloma (image 26).  No pleural thickening, significant effusion, or evidence of pneumothorax.     Other findings: Scattered enlarged mediastinal and bihilar lymph nodes are present, for example 12 mm short axis subcarinal node (series 5, image 52).  No necrotic adenopathy is identified.  The visualized thyroid is unremarkable. No acute or suspicious focal abnormality through the visualized upper abdomen. No abnormality of the thoracic wall soft tissues. No acute osseous displacement or destructive focal lesion.        Impression:        1. No CT evidence of central or segmental pulmonary thromboembolism.  2. Nonspecific mildly enlarged mediastinal/bihilar lymph nodes may represent reactive adenopathy, otherwise indeterminate etiology in eating close consideration within the full clinical context.  3. No evidence of acute airspace consolidation or other suspicious pulmonary process.  4. Chronic secondary details discussed above.  ==========     Will follow up with CT thorax    Referred to Pulmonology

## 2022-11-16 NOTE — ASSESSMENT & PLAN NOTE
A1c 6.6% and most recently 6.2%- patient to resume Victoza  CBGs:   Hypoglycemia episodes:  Microalbumin:   Educated on ADA diet: eliminate/decrease high carbohydrate foods (rice, pasta, bread, potatoes (french fries, chips), candy, sweets, fruit juices, canned fruit, junk food, crackers, carbonated beverages)  Educated on health benefits of at least 5 days/ week of 30 minutes moderate intensity exercise (brisk walking) and 2 or more days/ week of muscle strength activities  Avoid alcohol or tobacco use  Eye exam: fundus photos today  Foot exam: 11/16/22   Resume Victoza

## 2022-11-16 NOTE — PROGRESS NOTES
Sindi L Alexus, NP   [facility]  [address] [facility] [address]      PATIENT NAME: Tawanda Izaguirre  : 1990  DATE: 22  MRN: [id number]      Billing Provider: Sindi Vaughan NP  Level of Service: MT OFFICE/OUTPT VISIT, EST, LEVL IV, 30-39 MIN  Patient PCP Information       Provider PCP Type    Sindi Vaughan NP General            Reason for Visit / Chief Complaint: Follow-up (Lab results)       History of Present Illness / Problem Focused Workflow     Tawanda Izaguirre presents to the clinic with Follow-up (Lab results)     2022 Initial Visit: 33 yo  male to establish PCP care. He denies past medical history. His primary concern today is right sided chest pain. Describes as burning. Intermittent. Unable to identify alleviating/ triggering factors. Will last a few seconds- minutes. Ongoing for a while. Was seen in ER twice for same with work up and referral to Cardiology. Appointment with cardiologist is next week and he plans to attend. He endorses blood in stool for the last few weeks without accompanying abdominal pain, n/v/d, poor appetite. He does endorse acid reflux and indigestion. Denies treatment. Not on current medications. He was hospitalized in 2022 with elevated A1c 6.6%. Has been out of [facility]. He uses home oxygen at night. He denies prior sleep study. Denies any fever, chills, night sweats, HA, dizziness, sore throat, cough, SOB, hematuria.     2022: 33 yo  male for follow up to discuss lab results. A1c improved to 6.2%. He had visit with Cardiologist with further testing ordered. Acid reflux improved with PPI. Continues to endorse intermittent episodes of bloody stools. Reviewed CT chest results from hospitalizations with need for f/u. DM foot/ eye exam today. Has not completed sleep study. Has home oxygen 2 L q HS. Otherwise, denies any other concerns/  complaints.     Other providers  [facility]- initial visit 11/10/22      Review of Systems     Review of Systems   Constitutional:  Negative for appetite change, chills, fatigue, fever and unexpected weight change.   HENT:  Negative for congestion, ear pain, hearing loss, sinus pressure, sore throat and tinnitus.    Respiratory:  Negative for cough, chest tightness, shortness of breath and wheezing.    Cardiovascular:  Negative for chest pain, palpitations and leg swelling.   Gastrointestinal:  Negative for abdominal distention, abdominal pain, blood in stool, constipation, diarrhea, nausea and vomiting.   Genitourinary:  Negative for dysuria and hematuria.   Musculoskeletal:  Negative for arthralgias and myalgias.   Skin:  Negative for pallor.   Allergic/Immunologic: Negative for environmental allergies.   Neurological:  Negative for dizziness, syncope, weakness, numbness and headaches.   Hematological:  Negative for adenopathy. Does not bruise/bleed easily.   Psychiatric/Behavioral:  Negative for agitation, behavioral problems, confusion, hallucinations, sleep disturbance and suicidal ideas. The patient is not nervous/anxious.      Medical / Social / Family History     Past Medical History:   Diagnosis Date    Chest pain        Past Surgical History:   Procedure Laterality Date    denies         Social History  Mr. Neff  reports that he has never smoked. He has never used smokeless tobacco. He reports that he does not currently use alcohol. He reports that he does not use drugs.    Family History  Mr. Neff' family history includes Diabetes Mellitus in his mother; No Known Problems in his father.    Medications and Allergies     Medications  Medication List with Changes/Refills   New Medications    BLOOD SUGAR DIAGNOSTIC STRP    To check BG once daily, to use with insurance preferred meter    BLOOD-GLUCOSE METER KIT    To check BG once daily, to use with insurance preferred meter    LANCETS MISC    To  "check BG once daily, to use with insurance preferred meter    LIRAGLUTIDE 0.6 MG/0.1 ML, 18 MG/3 ML, SUBQ PNIJ (VICTOZA 2-LOIS) 0.6 MG/0.1 ML (18 MG/3 ML) PNIJ PEN    Inject 0.6 mg into the skin once daily.    TERBINAFINE HCL (LAMISIL) 250 MG TABLET    Take 1 tablet (250 mg total) by mouth once daily.   Changed and/or Refilled Medications    Modified Medication Previous Medication    OMEPRAZOLE (PRILOSEC) 20 MG CAPSULE omeprazole (PRILOSEC) 20 MG capsule       Take 1 capsule (20 mg total) by mouth once daily.    Take 1 capsule (20 mg total) by mouth once daily.    TECHLITE PEN NEEDLE 32 GAUGE X 5/32" NDLE TECHLITE PEN NEEDLE 32 gauge x 5/32" Ndle       Use one pen needle for victoza injection.    USE 1 new pen needle FOR EACH injection   Discontinued Medications    LIRAGLUTIDE 0.6 MG/0.1 ML, 18 MG/3 ML, SUBQ PNIJ (VICTOZA 2-LOIS) 0.6 MG/0.1 ML (18 MG/3 ML) PNIJ PEN    Inject 0.6 mg into the skin once daily.    MICONAZOLE NITRATE 2 % (MICOTIN) 2 % TOP POWDER    Apply topically 2 (two) times daily.    PEN NEEDLE, DIABETIC 32 GAUGE X 5/16" NDLE    1 each by Misc.(Non-Drug; Combo Route) route once daily.       Allergies  Review of patient's allergies indicates:  No Known Allergies    Physical Examination     Vitals:    11/16/22 0756   BP: 110/74   Pulse: 80   Resp: 20   Temp: 98.4 °F (36.9 °C)     Physical Exam  Vitals and nursing note reviewed.   Constitutional:       Appearance: Normal appearance. He is not ill-appearing.   HENT:      Head: Normocephalic.   Cardiovascular:      Rate and Rhythm: Normal rate and regular rhythm.      Pulses: Normal pulses.           Dorsalis pedis pulses are 2+ on the right side and 2+ on the left side.        Posterior tibial pulses are 2+ on the right side and 2+ on the left side.   Pulmonary:      Effort: Pulmonary effort is normal. No respiratory distress.      Breath sounds: Normal breath sounds.   Abdominal:      General: Bowel sounds are normal. There is no distension.      " Palpations: Abdomen is soft. There is no mass.      Tenderness: There is no abdominal tenderness.      Hernia: No hernia is present.   Musculoskeletal:         General: Normal range of motion.      Cervical back: Normal range of motion and neck supple.      Right lower leg: No edema.      Left lower leg: No edema.   Feet:      Right foot:      Protective Sensation: 5 sites tested.  5 sites sensed.      Skin integrity: Skin integrity normal.      Toenail Condition: Fungal disease present.     Left foot:      Protective Sensation: 5 sites tested.  5 sites sensed.      Skin integrity: Skin integrity normal.      Toenail Condition: Fungal disease present.  Skin:     General: Skin is warm and dry.      Capillary Refill: Capillary refill takes less than 2 seconds.   Neurological:      Mental Status: He is alert and oriented to person, place, and time. Mental status is at baseline.      Motor: No weakness.   Psychiatric:         Mood and Affect: Mood normal.         Behavior: Behavior normal.         Thought Content: Thought content normal.         Judgment: Judgment normal.         Results     Lab Results   Component Value Date    WBC 6.5 09/23/2022    RBC 5.87 09/23/2022    HGB 15.9 09/23/2022    HCT 51.5 09/23/2022    MCV 87.7 09/23/2022    MCH 27.1 09/23/2022    MCHC 30.9 (L) 09/23/2022    RDW 17.0 09/23/2022     09/23/2022    MPV 10.8 (H) 09/23/2022     CMP  Sodium Level   Date Value Ref Range Status   09/23/2022 140 136 - 145 mmol/L Final     Potassium Level   Date Value Ref Range Status   09/23/2022 3.7 3.5 - 5.1 mmol/L Final     Carbon Dioxide   Date Value Ref Range Status   09/23/2022 31 (H) 22 - 29 mmol/L Final     Blood Urea Nitrogen   Date Value Ref Range Status   09/23/2022 11.3 8.9 - 20.6 mg/dL Final     Creatinine   Date Value Ref Range Status   09/23/2022 0.77 0.73 - 1.18 mg/dL Final     Calcium Level Total   Date Value Ref Range Status   09/23/2022 9.1 8.4 - 10.2 mg/dL Final     Albumin Level   Date  Value Ref Range Status   09/23/2022 3.7 3.5 - 5.0 gm/dL Final     Bilirubin Total   Date Value Ref Range Status   09/23/2022 0.6 <=1.5 mg/dL Final     Alkaline Phosphatase   Date Value Ref Range Status   09/23/2022 103 40 - 150 unit/L Final     Aspartate Aminotransferase   Date Value Ref Range Status   09/23/2022 21 5 - 34 unit/L Final     Alanine Aminotransferase   Date Value Ref Range Status   09/23/2022 30 0 - 55 unit/L Final     Estimated GFR-Non    Date Value Ref Range Status   08/02/2022 >60 mls/min/1.73/m2 Final     Lab Results   Component Value Date    CHOL 83 07/30/2022     Lab Results   Component Value Date    HDL 15 (L) 07/30/2022     No results found for: LDLCALC  Lab Results   Component Value Date    TRIG 103 07/30/2022     No results found for: CHOLHDL  Lab Results   Component Value Date    TSH 3.2731 07/30/2022     Lab Results   Component Value Date    PROTEINUA Trace (A) 07/29/2022    LEUKOCYTESUR Negative 07/29/2022           Assessment and Plan (including Health Maintenance)     Plan:         Health Maintenance Due   Topic Date Due    COVID-19 Vaccine (1) Never done    Pneumococcal Vaccines (Age 0-64) (1 - PCV) Never done    Foot Exam  Never done    TETANUS VACCINE  Never done    Influenza Vaccine (1) Never done       Problem List Items Addressed This Visit          Derm    Onychomycosis    Current Assessment & Plan     Bilateral toe nails with fungal disease  Keep feet clean and dry, daily inspection of feet  Rx terbinafine 250 mg once daily x 12 weeks         Relevant Medications    terbinafine HCL (LAMISIL) 250 mg tablet       Pulmonary    On home oxygen therapy    Current Assessment & Plan     On home oxygen 2 L q HS, continue  Referral for sleep study          Pulmonary nodules    Current Assessment & Plan     7/31/22 CT chest  FINDINGS:  Images were reviewed in soft tissue, lung, and bone windows.     Exam quality: Limited secondary to extensive respiratory motion throughout  image acquisition, with resulting artifact.     Lines/tubes: none visualized     Pulmonary Vessels: No central or large segmental filling defect.     Cardiovascular: No suggestion of right heart strain; the RV/LV ratio is <1. No significant heart chamber enlargement. There is no pericardial effusion. No focal contour irregularity or intraluminal abnormality is appreciated involving the visualized aorta and branch vessels.     Lungs/Pleura: Central airways are patent. No organized airspace consolidation or other convincing acute pulmonary process is identified.  There is no aggressive appearing lung lesion.  Scattered punctate, benign-appearing nodules are present, for example lateral subpleural left upper lobe 2 mm nodule (series 13, image 41) and calcified anterior left apical granuloma (image 26).  No pleural thickening, significant effusion, or evidence of pneumothorax.     Other findings: Scattered enlarged mediastinal and bihilar lymph nodes are present, for example 12 mm short axis subcarinal node (series 5, image 52).  No necrotic adenopathy is identified.  The visualized thyroid is unremarkable. No acute or suspicious focal abnormality through the visualized upper abdomen. No abnormality of the thoracic wall soft tissues. No acute osseous displacement or destructive focal lesion.        Impression:        1. No CT evidence of central or segmental pulmonary thromboembolism.  2. Nonspecific mildly enlarged mediastinal/bihilar lymph nodes may represent reactive adenopathy, otherwise indeterminate etiology in eating close consideration within the full clinical context.  3. No evidence of acute airspace consolidation or other suspicious pulmonary process.  4. Chronic secondary details discussed above.  ==========     Will follow up with CT thorax   Referred to Pulmonology         Relevant Orders    Ambulatory referral/consult to Pulmonology    Basic Metabolic Panel    CT Chest With Contrast       Endocrine     "Morbid obesity    Current Assessment & Plan     BMI 67.53   Educated on increased risk of disease s/t obesity.  Educated on health benefits of at least 5 days/ week of 30 minutes moderate intensity exercise (brisk walking) and 2 or more days/ week of muscle strength activities (as tolerated).  Eat well balanced diet of fresh fruits/ vegetables, whole grains, lean meats and limit high carbohydrate foods.            Type 2 diabetes mellitus without complication, without long-term current use of insulin - Primary    Current Assessment & Plan     A1c 6.6% and most recently 6.2%- patient to resume Victoza  CBGs:   Hypoglycemia episodes:  Microalbumin:   Educated on ADA diet: eliminate/decrease high carbohydrate foods (rice, pasta, bread, potatoes (french fries, chips), candy, sweets, fruit juices, canned fruit, junk food, crackers, carbonated beverages)  Educated on health benefits of at least 5 days/ week of 30 minutes moderate intensity exercise (brisk walking) and 2 or more days/ week of muscle strength activities  Avoid alcohol or tobacco use  Eye exam: fundus photos today  Foot exam: 11/16/22   Resume Victoza            Relevant Medications    liraglutide 0.6 mg/0.1 mL, 18 mg/3 mL, subq PNIJ (VICTOZA 2-LOIS) 0.6 mg/0.1 mL (18 mg/3 mL) PnIj pen    TECHLITE PEN NEEDLE 32 gauge x 5/32" Ndle    blood-glucose meter kit    lancets Misc    blood sugar diagnostic Strp    Other Relevant Orders    Comprehensive Metabolic Panel    Hemoglobin A1C    Microalbumin/Creatinine Ratio, Urine       GI    Blood in stool    Current Assessment & Plan     Continues to endorse intermittent episodes of blood in stool  XR abdomen reviewed without acute concerns  FIT re-ordered   Referred to GI for further evaluation         Relevant Orders    OCCULT BLOOD FECAL IMMUNOASSAY    Gastroesophageal reflux disease without esophagitis    Current Assessment & Plan     Improved with PPI  GERD diet and precautions discussed such as:  Avoid tobacco/ " alcohol, NSAID use; Avoid spicy/ acidic foods, tomato sauce, jose, caffeine, chocolate, onions  Eat smaller meals; keep HOB elevated at least 2 hours after eating  Continue with current medication regimen           Relevant Medications    omeprazole (PRILOSEC) 20 MG capsule       Other    Mediastinal lymphadenopathy    Current Assessment & Plan     See CT chest results; CT thorax w/ contrast ordered for f/u  Referral to Pulmonology         Relevant Orders    Ambulatory referral/consult to Pulmonology    Basic Metabolic Panel    CT Chest With Contrast    Obesity hypoventilation syndrome    Current Assessment & Plan     EPWORTH SLEEPINESS SCALE 11/16/2022   Sitting and reading 1   Watching TV 1   Sitting, inactive in a public place (e.g. a theatre or a meeting) 1   As a passenger in a car for an hour without a break 3   Lying down to rest in the afternoon when circumstances permit 2   Sitting and talking to someone 1   Sitting quietly after a lunch without alcohol 2   In a car, while stopped for a few minutes in traffic 1   Total score 12     On 2 L home oxygen q HS  Referral to sleep lab          Relevant Orders    Ambulatory referral/consult to Sleep Disorders     Other Visit Diagnoses       Localized enlarged lymph nodes        Relevant Orders    CT Chest With Contrast            Health Maintenance Topics with due status: Not Due       Topic Last Completion Date    Eye Exam 11/16/2022       Future Appointments   Date Time Provider Department Center   11/22/2022 10:00 AM CV [facility] EXERCISE STRESS 01 [facility] [place]   3/7/2023  9:00 AM [facility]   5/16/2023  7:45 [facility]      Follow up in 6 months with labs.       Signature:  Sindi Vaughan NP  [facility]  [address] [facility] [address]    Date of encounter: 11/16/22

## 2022-11-16 NOTE — ASSESSMENT & PLAN NOTE
Improved with PPI  GERD diet and precautions discussed such as:  Avoid tobacco/ alcohol, NSAID use; Avoid spicy/ acidic foods, tomato sauce, jose, caffeine, chocolate, onions  Eat smaller meals; keep HOB elevated at least 2 hours after eating  Continue with current medication regimen

## 2022-11-16 NOTE — ASSESSMENT & PLAN NOTE
Continues to endorse intermittent episodes of blood in stool  XR abdomen reviewed without acute concerns  FIT re-ordered   Referred to GI for further evaluation

## 2022-11-16 NOTE — PROGRESS NOTES
Tawanda Izaguirre is a 32 y.o. male here for a diabetic eye screening with non-dilated fundus photos per Sindi Vaughan NP.    Patient cooperative?: Yes  Small pupils?: No  Last eye exam: unknown    For exam results, see Encounter Report.

## 2022-11-16 NOTE — ASSESSMENT & PLAN NOTE
EPWORTH SLEEPINESS SCALE 11/16/2022   Sitting and reading 1   Watching TV 1   Sitting, inactive in a public place (e.g. a theatre or a meeting) 1   As a passenger in a car for an hour without a break 3   Lying down to rest in the afternoon when circumstances permit 2   Sitting and talking to someone 1   Sitting quietly after a lunch without alcohol 2   In a car, while stopped for a few minutes in traffic 1   Total score 12     On 2 L home oxygen q HS  Referral to sleep lab

## 2022-11-16 NOTE — ASSESSMENT & PLAN NOTE
Bilateral toe nails with fungal disease  Keep feet clean and dry, daily inspection of feet  Rx terbinafine 250 mg once daily x 12 weeks

## 2022-11-16 NOTE — ASSESSMENT & PLAN NOTE
BMI 67.53   Educated on increased risk of disease s/t obesity.  Educated on health benefits of at least 5 days/ week of 30 minutes moderate intensity exercise (brisk walking) and 2 or more days/ week of muscle strength activities (as tolerated).  Eat well balanced diet of fresh fruits/ vegetables, whole grains, lean meats and limit high carbohydrate foods.

## 2022-11-16 NOTE — TELEPHONE ENCOUNTER
Please let patient know diabetic eye results did not show any findings of retinopathy. It is recommended to recheck in a year for screening.     Thank you

## 2022-11-22 ENCOUNTER — HOSPITAL ENCOUNTER (OUTPATIENT)
Dept: CARDIOLOGY | Facility: HOSPITAL | Age: 32
Discharge: HOME | End: 2022-11-22
Attending: NURSE PRACTITIONER

## 2022-11-22 VITALS — RESPIRATION RATE: 20 BRPM | DIASTOLIC BLOOD PRESSURE: 76 MMHG | SYSTOLIC BLOOD PRESSURE: 104 MMHG | HEART RATE: 80 BPM

## 2022-11-22 DIAGNOSIS — R07.89 OTHER CHEST PAIN: ICD-10-CM

## 2022-11-22 DIAGNOSIS — R06.02 SOB (SHORTNESS OF BREATH): ICD-10-CM

## 2022-11-22 LAB
CV STRESS BASE HR: 80 BPM
DIASTOLIC BLOOD PRESSURE: 76 MMHG
OHS CV CPX 85 PERCENT MAX PREDICTED HEART RATE MALE: 158
OHS CV CPX MAX PREDICTED HEART RATE: 186
OHS CV CPX PATIENT IS FEMALE: 0
OHS CV CPX PATIENT IS MALE: 1
OHS CV CPX PEAK DIASTOLIC BLOOD PRESSURE: 68 MMHG
OHS CV CPX PEAK HEAR RATE: 155 BPM
OHS CV CPX PEAK RATE PRESSURE PRODUCT: NORMAL
OHS CV CPX PEAK SYSTOLIC BLOOD PRESSURE: 175 MMHG
OHS CV CPX PERCENT MAX PREDICTED HEART RATE ACHIEVED: 83
OHS CV CPX RATE PRESSURE PRODUCT PRESENTING: 8320
SYSTOLIC BLOOD PRESSURE: 104 MMHG

## 2022-11-22 PROCEDURE — 93017 CV STRESS TEST TRACING ONLY: CPT

## 2022-11-24 PROCEDURE — 82274 ASSAY TEST FOR BLOOD FECAL: CPT | Performed by: NURSE PRACTITIONER

## 2022-11-27 LAB — MAYO GENERIC ORDERABLE RESULT: ABNORMAL

## 2022-11-28 ENCOUNTER — TELEPHONE (OUTPATIENT)
Dept: INTERNAL MEDICINE | Facility: CLINIC | Age: 32
End: 2022-11-28

## 2022-11-28 NOTE — TELEPHONE ENCOUNTER
Please let patient know stool test is abnormal/ positive for blood noted. I had placed referral to GI clinic for further evaluation at his last visit but no appointment noted at this time. It seems patient may have refused per chart review? Please ask patient if he declined an appointment?     Please let me know his response.     Thanks

## 2022-11-29 NOTE — TELEPHONE ENCOUNTER
Pt verbalized understanding. Pt stated he did not decline appointment he states they have not contact him at all in regards to an appointment I will contact GI clinic and have them contact pt again.

## 2022-12-12 ENCOUNTER — TELEPHONE (OUTPATIENT)
Dept: INTERNAL MEDICINE | Facility: CLINIC | Age: 32
End: 2022-12-12

## 2022-12-12 ENCOUNTER — HOSPITAL ENCOUNTER (OUTPATIENT)
Dept: RADIOLOGY | Facility: HOSPITAL | Age: 32
Discharge: HOME | End: 2022-12-12
Attending: NURSE PRACTITIONER

## 2022-12-12 DIAGNOSIS — R91.8 PULMONARY NODULES: ICD-10-CM

## 2022-12-12 DIAGNOSIS — R59.0 MEDIASTINAL LYMPHADENOPATHY: ICD-10-CM

## 2022-12-12 DIAGNOSIS — R59.0 LOCALIZED ENLARGED LYMPH NODES: ICD-10-CM

## 2022-12-12 LAB
CREAT SERPL-MCNC: 0.66 MG/DL (ref 0.73–1.18)
GFR SERPLBLD CREATININE-BSD FMLA CKD-EPI: >60 MLS/MIN/1.73/M2

## 2022-12-12 PROCEDURE — 25500020 PHARM REV CODE 255: Performed by: NURSE PRACTITIONER

## 2022-12-12 PROCEDURE — 36415 COLL VENOUS BLD VENIPUNCTURE: CPT | Performed by: NURSE PRACTITIONER

## 2022-12-12 PROCEDURE — 82565 ASSAY OF CREATININE: CPT | Performed by: NURSE PRACTITIONER

## 2022-12-12 PROCEDURE — 71260 CT THORAX DX C+: CPT | Mod: TC

## 2022-12-12 RX ADMIN — IOHEXOL 100 ML: 350 INJECTION, SOLUTION INTRAVENOUS at 11:12

## 2022-12-12 NOTE — TELEPHONE ENCOUNTER
Please let patient know lung CT results showed improved/ resolved findings from prior scan. No abnormal lymph nodules/ nodules or concerning areas noted.     Please let me know if he has any further questions/ concerns.     Thanks

## 2023-01-20 DIAGNOSIS — G47.33 OSA (OBSTRUCTIVE SLEEP APNEA): Primary | ICD-10-CM

## 2023-02-13 ENCOUNTER — PROCEDURE VISIT (OUTPATIENT)
Dept: SLEEP MEDICINE | Facility: HOSPITAL | Age: 33
End: 2023-02-13
Attending: NURSE PRACTITIONER

## 2023-02-13 DIAGNOSIS — G47.33 OSA (OBSTRUCTIVE SLEEP APNEA): ICD-10-CM

## 2023-02-13 PROCEDURE — 95811 POLYSOM 6/>YRS CPAP 4/> PARM: CPT

## 2023-03-01 DIAGNOSIS — G47.33 OSA TREATED WITH BIPAP: Primary | ICD-10-CM

## 2023-03-02 ENCOUNTER — OFFICE VISIT (OUTPATIENT)
Dept: GASTROENTEROLOGY | Facility: CLINIC | Age: 33
End: 2023-03-02

## 2023-03-02 VITALS
DIASTOLIC BLOOD PRESSURE: 83 MMHG | BODY MASS INDEX: 55.81 KG/M2 | TEMPERATURE: 98 F | HEIGHT: 63 IN | HEART RATE: 74 BPM | OXYGEN SATURATION: 95 % | RESPIRATION RATE: 16 BRPM | SYSTOLIC BLOOD PRESSURE: 128 MMHG | WEIGHT: 315 LBS

## 2023-03-02 DIAGNOSIS — R19.5 OCCULT BLOOD IN STOOLS: Primary | ICD-10-CM

## 2023-03-02 DIAGNOSIS — K92.1 HEMATOCHEZIA: ICD-10-CM

## 2023-03-02 DIAGNOSIS — K59.09 CHRONIC CONSTIPATION: ICD-10-CM

## 2023-03-02 PROCEDURE — 99204 OFFICE O/P NEW MOD 45 MIN: CPT | Mod: S$PBB,,, | Performed by: NURSE PRACTITIONER

## 2023-03-02 PROCEDURE — 99204 PR OFFICE/OUTPT VISIT, NEW, LEVL IV, 45-59 MIN: ICD-10-PCS | Mod: S$PBB,,, | Performed by: NURSE PRACTITIONER

## 2023-03-02 PROCEDURE — 99215 OFFICE O/P EST HI 40 MIN: CPT | Mod: PBBFAC | Performed by: NURSE PRACTITIONER

## 2023-03-02 RX ORDER — TERBINAFINE HYDROCHLORIDE 250 MG/1
250 TABLET ORAL DAILY
COMMUNITY
Start: 2023-01-25 | End: 2023-05-16 | Stop reason: SDUPTHER

## 2023-03-02 RX ORDER — POLYETHYLENE GLYCOL 3350, SODIUM SULFATE, SODIUM CHLORIDE, POTASSIUM CHLORIDE, SODIUM ASCORBATE, AND ASCORBIC ACID 7.5-2.691G
2000 KIT ORAL ONCE
Qty: 1 KIT | Refills: 0 | Status: SHIPPED | OUTPATIENT
Start: 2023-03-02 | End: 2023-03-02

## 2023-03-02 NOTE — ASSESSMENT & PLAN NOTE
FOBT was positive November 27, 2022.   Recommend soluble fiber supplementation  Avoid straining or sitting on the toilet for long periods of time  Recommend MiraLAX 17 g daily  Colonoscopy  Hemorrhoidal banding discussed  Call with updates  ER precautions provided

## 2023-03-02 NOTE — PROGRESS NOTES
Subjective:       Patient ID: Tawanda Izaguirre is a 32 y.o. male.    Chief Complaint: Rectal Bleeding (Pt states he feels like something is moving in abdomen after bleeding and then the pain gets worse.) and Constipation (Anal pain and burning after having BM)    This 32-year-old  male is referred for rectal bleeding.  He presents unaccompanied.   [employer]384 used throughout patient visit.  He reports feeling more constipated since November 2022.  Prior to this time, he reports having 1-2 formed stools daily.  In November 2022, he reports having 5-6 bowel movements daily secondary to not feeling completely evacuated.  He has occasional bloating and gas.  He reports frequent red blood with bowel movements noted on the tissue after wiping and in the toilet bowl.  Has occasional straining.  He denies taking anything for symptomatic relief of constipation.  His appetite is good and his weight is stable.  He denies nocturnal symptoms, fever, chills, nausea, vomiting, hematemesis, odynophagia, dysphagia, acid reflux, pyrosis, early satiety, abdominal pain, melena, fecal urgency, fecal incontinence, or pain with defecation.     FOBT was positive November 27, 2022.  He denies ever having an EGD or colonoscopy done.  He denies regular NSAID use or use of blood thinners.  He denies tobacco or alcohol use.  He denies illicit drug use.  He denies a family history of IBD, colon polyps, or colon cancer.    Review of patient's allergies indicates:  No Known Allergies    Past Medical History:   Diagnosis Date    Chest pain     Type 2 diabetes mellitus without complications      History reviewed. No pertinent surgical history.    Family History:   family history includes Diabetes Mellitus in his mother; No Known Problems in his father.    Social History:    reports that he has never smoked. He has never used smokeless tobacco. He reports that he does not currently use alcohol. He reports that he does not  use drugs.    Review of Systems  Negative except as noted in the HPI.      Objective:      Physical Exam  Constitutional:       Appearance: Normal appearance.   HENT:      Head: Normocephalic.      Mouth/Throat:      Mouth: Mucous membranes are moist.   Eyes:      Extraocular Movements: Extraocular movements intact.      Conjunctiva/sclera: Conjunctivae normal.      Pupils: Pupils are equal, round, and reactive to light.   Cardiovascular:      Rate and Rhythm: Normal rate and regular rhythm.      Pulses: Normal pulses.      Heart sounds: Normal heart sounds.   Pulmonary:      Effort: Pulmonary effort is normal.      Breath sounds: Normal breath sounds.   Abdominal:      General: Bowel sounds are normal.      Palpations: Abdomen is soft.   Musculoskeletal:         General: Normal range of motion.      Cervical back: Normal range of motion and neck supple.   Skin:     General: Skin is warm and dry.   Neurological:      General: No focal deficit present.      Mental Status: He is alert and oriented to person, place, and time.   Psychiatric:         Mood and Affect: Mood normal.         Behavior: Behavior normal.         Thought Content: Thought content normal.         Judgment: Judgment normal.       Home Medications:     Current Outpatient Medications   Medication Sig    liraglutide 0.6 mg/0.1 mL, 18 mg/3 mL, subq PNIJ (VICTOZA 2-LOIS) 0.6 mg/0.1 mL (18 mg/3 mL) PnIj pen Inject 0.6 mg into the skin once daily.    terbinafine HCL (LAMISIL) 250 mg tablet Take 250 mg by mouth once daily.    blood sugar diagnostic Strp To check BG once daily, to use with insurance preferred meter (Patient not taking: Reported on 3/2/2023)    blood-glucose meter kit To check BG once daily, to use with insurance preferred meter (Patient not taking: Reported on 3/2/2023)    lancets Misc To check BG once daily, to use with insurance preferred meter (Patient not taking: Reported on 3/2/2023)    omeprazole (PRILOSEC) 20 MG capsule Take 1 capsule  "(20 mg total) by mouth once daily. (Patient not taking: Reported on 3/2/2023)    TECHLITE PEN NEEDLE 32 gauge x 5/32" Ndle Use one pen needle for victoza injection. (Patient not taking: Reported on 3/2/2023)     Laboratory Results:     Recent Results (from the past 4032 hour(s))   Comprehensive metabolic panel    Collection Time: 09/23/22  5:00 PM   Result Value Ref Range    Sodium Level 140 136 - 145 mmol/L    Potassium Level 3.7 3.5 - 5.1 mmol/L    Chloride 101 98 - 107 mmol/L    Carbon Dioxide 31 (H) 22 - 29 mmol/L    Glucose Level 107 (H) 74 - 100 mg/dL    Blood Urea Nitrogen 11.3 8.9 - 20.6 mg/dL    Creatinine 0.77 0.73 - 1.18 mg/dL    Calcium Level Total 9.1 8.4 - 10.2 mg/dL    Protein Total 7.3 6.4 - 8.3 gm/dL    Albumin Level 3.7 3.5 - 5.0 gm/dL    Globulin 3.6 (H) 2.4 - 3.5 gm/dL    Albumin/Globulin Ratio 1.0 (L) 1.1 - 2.0 ratio    Bilirubin Total 0.6 <=1.5 mg/dL    Alkaline Phosphatase 103 40 - 150 unit/L    Alanine Aminotransferase 30 0 - 55 unit/L    Aspartate Aminotransferase 21 5 - 34 unit/L    eGFR >60 mls/min/1.73/m2   Troponin I    Collection Time: 09/23/22  5:00 PM   Result Value Ref Range    Troponin-I <0.010 0.000 - 0.045 ng/mL   D dimer, quantitative    Collection Time: 09/23/22  5:00 PM   Result Value Ref Range    D-Dimer 0.29 0.00 - 0.50 ug/mL FEU   CPK    Collection Time: 09/23/22  5:00 PM   Result Value Ref Range    Creatine Kinase 94 30 - 200 U/L   CK-MB    Collection Time: 09/23/22  5:00 PM   Result Value Ref Range    Creatine Kinase MB 1.6 <=7.2 ng/mL   CBC with Differential    Collection Time: 09/23/22  5:00 PM   Result Value Ref Range    WBC 6.5 4.5 - 11.5 x10(3)/mcL    RBC 5.87 4.70 - 6.10 x10(6)/mcL    Hgb 15.9 14.0 - 18.0 gm/dL    Hct 51.5 42.0 - 52.0 %    MCV 87.7 80.0 - 94.0 fL    MCH 27.1 27.0 - 31.0 pg    MCHC 30.9 (L) 33.0 - 36.0 mg/dL    RDW 17.0 11.5 - 17.0 %    Platelet 189 130 - 400 x10(3)/mcL    MPV 10.8 (H) 7.4 - 10.4 fL    Neut % 64.3 %    Lymph % 20.2 %    Mono % 12.1 % "    Eos % 2.9 %    Basophil % 0.2 %    Lymph # 1.32 0.6 - 4.6 x10(3)/mcL    Neut # 4.2 2.1 - 9.2 x10(3)/mcL    Mono # 0.79 0.1 - 1.3 x10(3)/mcL    Eos # 0.19 0 - 0.9 x10(3)/mcL    Baso # 0.01 0 - 0.2 x10(3)/mcL    IG# 0.02 0 - 0.04 x10(3)/mcL    IG% 0.3 %    NRBC% 0.0 %   Brain natriuretic peptide    Collection Time: 09/23/22  5:00 PM   Result Value Ref Range    Natriuretic Peptide 51.0 <=100.0 pg/mL   Hepatitis Panel, Acute    Collection Time: 11/02/22 12:18 PM   Result Value Ref Range    Hepatitis A IgM Nonreactive Nonreactive    Hepatitis B Core IgM Nonreactive Nonreactive    Hepatitis B Surface Antigen Nonreactive Nonreactive    Hepatitis C Antibody Nonreactive Nonreactive   Hemoglobin A1C    Collection Time: 11/02/22 12:18 PM   Result Value Ref Range    Hemoglobin A1c 6.2 <=7.0 %    Estimated Average Glucose 131.2 mg/dL   Pathologist Interpretation    Collection Time: 11/02/22 12:18 PM   Result Value Ref Range    Pathology Review       No serological evidence of recent or past hepatitis A, B, or C infection.    Jose Carranza M.D.      Exercise Stress - EKG    Collection Time: 11/22/22 10:03 AM   Result Value Ref Range    85% Max Predicted      Max Predicted      [facility] CV CPX PATIENT IS MALE 1.0     [facility] CV CPX PATIENT IS FEMALE 0.0     Systolic blood pressure 104 mmHg    Diastolic blood pressure 76 mmHg    HR at rest 80 bpm    Peak Systolic  mmHg    Peak Diatolic BP 68 mmHg    Peak  bpm    % Max HR Achieved 83     RPP 8,320     Peak RPP 27,125    [place] GENERIC ORDERABLE FOBT (Fecal Occult Blood Immunoassay)    Collection Time: 11/24/22  7:25 AM   Result Value Ref Range    [place] Generic Orderable SEE COMMENTS (A)    Creatinine Serum, ASAP    Collection Time: 12/12/22 11:01 AM   Result Value Ref Range    Creatinine 0.66 (L) 0.73 - 1.18 mg/dL    eGFR >60 mls/min/1.73/m2     Imaging Results:     Narrative & Impression  EXAMINATION:  XR ABDOMEN FLAT AND ERECT     CLINICAL HISTORY:  blood in  stool;, Melena.     FINDINGS:  Examination reveals some residual feces throughout the colon gas pattern is nonspecific with no clear evidence of ileus or obstruction no abnormal masses or calcifications identified no evidence of free air     Impression:     Nonspecific gas pattern.     Residual feces.      Electronically signed by: Jorge Holliday  Date:                                            11/02/2022  Time:                                           12:14    Assessment/Plan:     Problem List Items Addressed This Visit          GI    Hematochezia     FOBT was positive November 27, 2022.   Recommend soluble fiber supplementation  Avoid straining or sitting on the toilet for long periods of time  Recommend MiraLAX 17 g daily  Colonoscopy  Hemorrhoidal banding discussed  Call with updates  ER precautions provided         Relevant Orders    Case Request Endoscopy: COLONOSCOPY (Completed)    Occult blood in stools - Primary     See above         Relevant Orders    Case Request Endoscopy: COLONOSCOPY (Completed)    Chronic constipation     See above         Relevant Orders    Case Request Endoscopy: COLONOSCOPY (Completed)

## 2023-03-07 ENCOUNTER — OFFICE VISIT (OUTPATIENT)
Dept: CARDIOLOGY | Facility: CLINIC | Age: 33
End: 2023-03-07

## 2023-03-07 VITALS
SYSTOLIC BLOOD PRESSURE: 122 MMHG | OXYGEN SATURATION: 95 % | HEIGHT: 63 IN | HEART RATE: 82 BPM | WEIGHT: 315 LBS | BODY MASS INDEX: 55.81 KG/M2 | RESPIRATION RATE: 20 BRPM | DIASTOLIC BLOOD PRESSURE: 76 MMHG | TEMPERATURE: 98 F

## 2023-03-07 DIAGNOSIS — R07.89 OTHER CHEST PAIN: Primary | ICD-10-CM

## 2023-03-07 PROCEDURE — 99214 OFFICE O/P EST MOD 30 MIN: CPT | Mod: PBBFAC | Performed by: INTERNAL MEDICINE

## 2023-03-07 NOTE — PROGRESS NOTES
CHIEF COMPLAINT:   Chief Complaint   Patient presents with    f/u visit, c/o occass. chest discomfort, denies sob       Review of patient's allergies indicates:  No Known Allergies                                       HPI:  Tawanda Izaguirre 32 y.o. male with a past medical history of chest pain, obesity hypoventilation syndrome (on home oxygen nightly), morbid obesity, DM2, GERD, and hepatic steatosis who was referred to Cardiology Clinic for chest pain after 2 ER visits in September 2022. Patient had 2 ER visits September 2022 which revealed negative troponins and EKG changes of right axis deviation, possible right ventricular hypertrophy, and incomplete RBBB. Echocardiogram completed on 8.1.22 with EF of 55%, mild right ventricular enlargement, and intermediate central venous pressure (8mmHg).  A treadmill ECG stress test was performed since last visit which was negative for inducible ischemic ECG changes.  Patient achieved 83% of max HR.  His pains have decreased since last visit and now occur 1-2 times per week and always at rest.  Last episode was Saturday morning when he woke up he noticed sharp chest pain which resolved after 30 minutes to 1 hour on its own.  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                                                                                                                                                                                                                                                                                                  CARDIAC TESTING:  Stress ECG      The EKG portion of this study is abnormal but not diagnostic.    The patient reported no chest pain during the stress test.    There were no arrhythmias during stress.    The patient exercised on a [facility] protocol, achieving a peak heart rate of 155 bpm, which is 83 % of the age predicted maximum heart rate.     Patient did not reach target heart  rate  Minutes exercised:  6 min  31 sec  Adjusted ST deviation:  0 mm   Angina:  not reported   Duke treadmill score (Min - ST - Index):  6.  Patient's Score:  >5.      The patient has LOW risk for cardiovascular events (based on Duke Score)  Echo 8.1.22  The estimated ejection fraction is 55%.  Normal systolic function.  Mild right ventricular enlargement with normal right ventricular systolic function.  Intermediate central venous pressure (8 mmHg).  The estimated PA systolic pressure is 28 mmHg.  IVC is dilated and collapses >50% with inspiration.  There is no pulmonary hypertension.    Patient Active Problem List   Diagnosis    SOB (shortness of breath)    Obesity hypoventilation syndrome    Hepatic steatosis    Other chest pain    Morbid obesity    Pulmonary nodules    Hematochezia    Gastroesophageal reflux disease without esophagitis    On home oxygen therapy    Type 2 diabetes mellitus without complication, without long-term current use of insulin    Mediastinal lymphadenopathy    Onychomycosis    Occult blood in stools    Chronic constipation     History reviewed. No pertinent surgical history.    Social History     Socioeconomic History    Marital status: Single   Tobacco Use    Smoking status: Never    Smokeless tobacco: Never   Substance and Sexual Activity    Alcohol use: Not Currently     Comment: quit drinking 5 years ago    Drug use: Never     Social Determinants of Health     Physical Activity: Inactive    Days of Exercise per Week: 0 days    Minutes of Exercise per Session: 0 min        Family History   Problem Relation Age of Onset    Diabetes Mellitus Mother     No Known Problems Father        Current Outpatient Medications:     liraglutide 0.6 mg/0.1 mL, 18 mg/3 mL, subq PNIJ (VICTOZA 2-LOIS) 0.6 mg/0.1 mL (18 mg/3 mL) PnIj pen, Inject 0.6 mg into the skin once daily., Disp: 3 mL, Rfl: 6    terbinafine HCL (LAMISIL) 250 mg tablet, Take 250 mg by mouth once daily., Disp: , Rfl:     blood sugar  "diagnostic Strp, To check BG once daily, to use with insurance preferred meter (Patient not taking: Reported on 3/2/2023), Disp: 100 each, Rfl: 3    blood-glucose meter kit, To check BG once daily, to use with insurance preferred meter (Patient not taking: Reported on 3/2/2023), Disp: 1 each, Rfl: 0    lancets Misc, To check BG once daily, to use with insurance preferred meter (Patient not taking: Reported on 3/2/2023), Disp: 100 each, Rfl: 3    omeprazole (PRILOSEC) 20 MG capsule, Take 1 capsule (20 mg total) by mouth once daily. (Patient not taking: Reported on 3/2/2023), Disp: 30 capsule, Rfl: 6    TECHLITE PEN NEEDLE 32 gauge x 5/32" Ndle, Use one pen needle for victoza injection. (Patient not taking: Reported on 3/2/2023), Disp: 100 each, Rfl: 3     ROS:                                                                                                                                                                             Review of Systems   Constitutional: Negative.  Negative for chills, fever and malaise/fatigue.   HENT:  Negative for hearing loss and sore throat.    Eyes:  Negative for blurred vision and pain.   Respiratory:  Negative for cough, shortness of breath and wheezing.    Cardiovascular:  Positive for chest pain. Negative for palpitations, orthopnea, leg swelling and PND.   Gastrointestinal:  Positive for constipation. Negative for abdominal pain, diarrhea, nausea and vomiting.   Musculoskeletal: Negative.    Skin: Negative.    Neurological: Negative.  Negative for dizziness, sensory change and focal weakness.   Psychiatric/Behavioral: Negative.        Blood pressure 122/76, pulse 82, temperature 98.4 °F (36.9 °C), temperature source Oral, resp. rate 20, height 5' 3" (1.6 m), weight (!) 156 kg (343 lb 14.7 oz), SpO2 95 %.     PE:  Physical Exam  Constitutional:       Appearance: Normal appearance.   HENT:      Head: Normocephalic.   Eyes:      Extraocular Movements: Extraocular movements intact. "   Cardiovascular:      Rate and Rhythm: Normal rate and regular rhythm.   Pulmonary:      Effort: Pulmonary effort is normal.   Abdominal:      Palpations: Abdomen is soft.      Comments: obese   Musculoskeletal:         General: Normal range of motion.      Cervical back: Neck supple.   Skin:     General: Skin is warm and dry.   Neurological:      General: No focal deficit present.      Mental Status: He is alert and oriented to person, place, and time.   Psychiatric:         Mood and Affect: Mood normal.      ASSESSMENT/PLAN:    Noncardiac Chest pain  - treadmill stress negative for ischemia  - possibly related to GERD, recommend further discussion with PCP/GI    Obesity hypoventilation syndrome (on home oxygen nightly)  Continue nightly oxygen   Management per PCP     Morbid obesity  Encourage heart healthy diet and increase exercise as tolerated     DM2  A1c 6.2%  Management per PCP   Encourage heart healthy/ADA diet and increase exercise as tolerated     GERD  - recommend PPI trial for aforementioned symptoms  Management per PCP     Hepatic steatosis  Management per PCP     Follow up in Cardiology Clinic prn  Follow up with PCP as directed     Jean Green MD  [employer] Fellow

## 2023-03-16 ENCOUNTER — TELEPHONE (OUTPATIENT)
Dept: PULMONOLOGY | Facility: CLINIC | Age: 33
End: 2023-03-16

## 2023-03-16 ENCOUNTER — DOCUMENTATION ONLY (OUTPATIENT)
Dept: PULMONOLOGY | Facility: CLINIC | Age: 33
End: 2023-03-16

## 2023-03-16 DIAGNOSIS — R91.1 LUNG NODULE: Primary | ICD-10-CM

## 2023-03-16 NOTE — TELEPHONE ENCOUNTER
Attempted to contact Mr. Torres regarding his upcoming lung mass appt.  His most recent chest CT revealed previously noted nodules and lymphadenopathy now resolved.  He does not need to come in for consultation.  I left a voice mailing asking him to call the clinic./tdr

## 2023-03-17 ENCOUNTER — TELEPHONE (OUTPATIENT)
Dept: PULMONOLOGY | Facility: CLINIC | Age: 33
End: 2023-03-17

## 2023-03-17 NOTE — TELEPHONE ENCOUNTER
Mikie Delong,  Mr. Brian Izaguirre was referred to pulmonary for lung nodules and mediastinal lymphadenopathy that was revealed on a chest CTA performed on 7/31/2022.  He had a repeat chest CT performed on 12/12/2022 that revealed resolution of the nodules/lymphadenopathy.  His pulmonary referral has been cancelled.  Please do not hesitate to contact me if he has a need in the future.    Thank you,  Shanthi Davis RN

## 2023-04-19 ENCOUNTER — PATIENT MESSAGE (OUTPATIENT)
Dept: RESEARCH | Facility: HOSPITAL | Age: 33
End: 2023-04-19

## 2023-05-16 ENCOUNTER — OFFICE VISIT (OUTPATIENT)
Dept: INTERNAL MEDICINE | Facility: CLINIC | Age: 33
End: 2023-05-16

## 2023-05-16 ENCOUNTER — LAB VISIT (OUTPATIENT)
Dept: LAB | Facility: HOSPITAL | Age: 33
End: 2023-05-16
Attending: NURSE PRACTITIONER

## 2023-05-16 ENCOUNTER — TELEPHONE (OUTPATIENT)
Dept: INTERNAL MEDICINE | Facility: CLINIC | Age: 33
End: 2023-05-16

## 2023-05-16 VITALS
SYSTOLIC BLOOD PRESSURE: 104 MMHG | HEART RATE: 75 BPM | HEIGHT: 63 IN | RESPIRATION RATE: 16 BRPM | DIASTOLIC BLOOD PRESSURE: 66 MMHG | BODY MASS INDEX: 55.81 KG/M2 | TEMPERATURE: 98 F | WEIGHT: 315 LBS

## 2023-05-16 DIAGNOSIS — K21.9 GASTROESOPHAGEAL REFLUX DISEASE WITHOUT ESOPHAGITIS: ICD-10-CM

## 2023-05-16 DIAGNOSIS — K76.0 HEPATIC STEATOSIS: ICD-10-CM

## 2023-05-16 DIAGNOSIS — Z99.81 ON HOME OXYGEN THERAPY: ICD-10-CM

## 2023-05-16 DIAGNOSIS — R91.8 PULMONARY NODULES: Primary | ICD-10-CM

## 2023-05-16 DIAGNOSIS — E66.2 OBESITY HYPOVENTILATION SYNDROME: ICD-10-CM

## 2023-05-16 DIAGNOSIS — E11.9 TYPE 2 DIABETES MELLITUS WITHOUT COMPLICATION, WITHOUT LONG-TERM CURRENT USE OF INSULIN: ICD-10-CM

## 2023-05-16 DIAGNOSIS — K92.1 HEMATOCHEZIA: ICD-10-CM

## 2023-05-16 DIAGNOSIS — R07.89 OTHER CHEST PAIN: ICD-10-CM

## 2023-05-16 DIAGNOSIS — B35.1 ONYCHOMYCOSIS: ICD-10-CM

## 2023-05-16 DIAGNOSIS — R59.0 MEDIASTINAL LYMPHADENOPATHY: ICD-10-CM

## 2023-05-16 DIAGNOSIS — E11.9 TYPE 2 DIABETES MELLITUS WITHOUT COMPLICATION, WITHOUT LONG-TERM CURRENT USE OF INSULIN: Primary | ICD-10-CM

## 2023-05-16 PROBLEM — E66.01 MORBID OBESITY: Status: RESOLVED | Noted: 2022-11-02 | Resolved: 2023-05-16

## 2023-05-16 LAB
ALBUMIN SERPL-MCNC: 4.1 G/DL (ref 3.5–5)
ALBUMIN/GLOB SERPL: 1.2 RATIO (ref 1.1–2)
ALP SERPL-CCNC: 112 UNIT/L (ref 40–150)
ALT SERPL-CCNC: 24 UNIT/L (ref 0–55)
AST SERPL-CCNC: 18 UNIT/L (ref 5–34)
BILIRUBIN DIRECT+TOT PNL SERPL-MCNC: 0.7 MG/DL
BUN SERPL-MCNC: 10.5 MG/DL (ref 8.9–20.6)
CALCIUM SERPL-MCNC: 9.4 MG/DL (ref 8.4–10.2)
CHLORIDE SERPL-SCNC: 101 MMOL/L (ref 98–107)
CO2 SERPL-SCNC: 34 MMOL/L (ref 22–29)
CREAT SERPL-MCNC: 0.75 MG/DL (ref 0.73–1.18)
CREAT UR-MCNC: 166.3 MG/DL (ref 63–166)
EST. AVERAGE GLUCOSE BLD GHB EST-MCNC: 125.5 MG/DL
GFR SERPLBLD CREATININE-BSD FMLA CKD-EPI: >60 MLS/MIN/1.73/M2
GLOBULIN SER-MCNC: 3.5 GM/DL (ref 2.4–3.5)
GLUCOSE SERPL-MCNC: 97 MG/DL (ref 74–100)
HBA1C MFR BLD: 6 %
MICROALBUMIN UR-MCNC: 12.3 UG/ML
MICROALBUMIN/CREAT RATIO PNL UR: 7.4 MG/GM CR (ref 0–30)
POTASSIUM SERPL-SCNC: 4.6 MMOL/L (ref 3.5–5.1)
PROT SERPL-MCNC: 7.6 GM/DL (ref 6.4–8.3)
SODIUM SERPL-SCNC: 138 MMOL/L (ref 136–145)

## 2023-05-16 PROCEDURE — 99214 OFFICE O/P EST MOD 30 MIN: CPT | Mod: PBBFAC | Performed by: NURSE PRACTITIONER

## 2023-05-16 PROCEDURE — 36415 COLL VENOUS BLD VENIPUNCTURE: CPT

## 2023-05-16 PROCEDURE — 80053 COMPREHEN METABOLIC PANEL: CPT

## 2023-05-16 PROCEDURE — 99214 PR OFFICE/OUTPT VISIT, EST, LEVL IV, 30-39 MIN: ICD-10-PCS | Mod: S$PBB,,, | Performed by: NURSE PRACTITIONER

## 2023-05-16 PROCEDURE — 83036 HEMOGLOBIN GLYCOSYLATED A1C: CPT

## 2023-05-16 PROCEDURE — 99214 OFFICE O/P EST MOD 30 MIN: CPT | Mod: S$PBB,,, | Performed by: NURSE PRACTITIONER

## 2023-05-16 PROCEDURE — 82043 UR ALBUMIN QUANTITATIVE: CPT

## 2023-05-16 RX ORDER — TERBINAFINE HYDROCHLORIDE 250 MG/1
250 TABLET ORAL DAILY
Qty: 30 TABLET | Refills: 2 | Status: SHIPPED | OUTPATIENT
Start: 2023-05-16

## 2023-05-16 RX ORDER — OMEPRAZOLE 40 MG/1
40 CAPSULE, DELAYED RELEASE ORAL DAILY
Qty: 90 CAPSULE | Refills: 2 | Status: SHIPPED | OUTPATIENT
Start: 2023-05-16 | End: 2023-11-20 | Stop reason: SDUPTHER

## 2023-05-16 NOTE — TELEPHONE ENCOUNTER
Please inform patient lab results and urine test results reviewed with acceptable levels.  Diabetes level is stable.  Patient to continue medications as prescribed.

## 2023-05-16 NOTE — PROGRESS NOTES
Sindi L Alexus, NP   [facility]  [address] [id number]      PATIENT NAME: Tawanda Izaguirre  : 1990  DATE: 23  MRN: [id number]        Reason for Visit / Chief Complaint: Follow-up (diabetes)       History of Present Illness / Problem Focused Workflow     Tawanda Izaguirre presents to the clinic with Follow-up (diabetes)     34 yo  male for routine follow up/ lab results. PMH includes atypical CP, DM, obesity hypoventilation syndrome, on home oxygen, hepatic steatosis, GERD, hemorrhoids/ rectal bleeding. /66. Labs not completed. He will do so after visit. He is using home oxygen and BPAP machine. He had f/u with GI and Cardiology. He is not on PPI for acid reflux. He is asking for refill of terbinafine for toenails. He otherwise denies any other concerns/ complaints.     Other providers   [facility]  [facility]       Review of Systems     Review of Systems   Constitutional:  Negative for appetite change, chills, fatigue, fever and unexpected weight change.   HENT:  Negative for congestion, ear pain, hearing loss, sinus pressure, sore throat and tinnitus.    Respiratory:  Negative for cough, chest tightness, shortness of breath and wheezing.    Cardiovascular:  Negative for chest pain, palpitations and leg swelling.   Gastrointestinal:  Negative for abdominal distention, abdominal pain, blood in stool, constipation, diarrhea, nausea and vomiting.   Genitourinary:  Negative for dysuria and hematuria.   Musculoskeletal:  Negative for arthralgias and myalgias.   Skin:  Negative for pallor.   Allergic/Immunologic: Negative for environmental allergies.   Neurological:  Negative for dizziness, syncope, weakness, numbness and headaches.   Hematological:  Negative for adenopathy. Does not bruise/bleed easily.   Psychiatric/Behavioral:  Negative for agitation, behavioral problems, confusion, hallucinations, sleep  "disturbance and suicidal ideas. The patient is not nervous/anxious.      Medical / Social / Family History     ----------------------------  Chest pain  Type 2 diabetes mellitus without complications     History reviewed. No pertinent surgical history.    Social History     Socioeconomic History    Marital status: Single   Tobacco Use    Smoking status: Never    Smokeless tobacco: Never   Substance and Sexual Activity    Alcohol use: Not Currently     Comment: quit drinking 5 years ago    Drug use: Never     Social Determinants of Health     Physical Activity: Inactive    Days of Exercise per Week: 0 days    Minutes of Exercise per Session: 0 min        Family History   Problem Relation Age of Onset    Diabetes Mellitus Mother     No Known Problems Father         Medications and Allergies     Medications  Current Outpatient Medications   Medication Instructions    blood sugar diagnostic Strp To check BG once daily, to use with insurance preferred meter    blood-glucose meter kit To check BG once daily, to use with insurance preferred meter    lancets Misc To check BG once daily, to use with insurance preferred meter    liraglutide 0.6 mg/0.1 mL (18 mg/3 mL) subq PNIJ (VICTOZA 2-LOIS) 0.6 mg, Subcutaneous, Daily    omeprazole (PRILOSEC) 40 mg, Oral, Daily    TECHLITE PEN NEEDLE 32 gauge x 5/32" Ndle Use one pen needle for victoza injection.    terbinafine HCL (LAMISIL) 250 mg, Oral, Daily       Allergies  Review of patient's allergies indicates:  No Known Allergies    Physical Examination     Visit Vitals  /66 (BP Location: Left arm, Patient Position: Sitting, BP Method: X-Large (Automatic))   Pulse 75   Temp 98 °F (36.7 °C) (Oral)   Resp 16   Ht 5' 3" (1.6 m)   Wt (!) 152.7 kg (336 lb 10.3 oz)   BMI 59.63 kg/m²       Physical Exam  Vitals and nursing note reviewed.   Constitutional:       Appearance: Normal appearance. He is not ill-appearing.   HENT:      Head: Normocephalic.   Cardiovascular:      Rate and " Rhythm: Normal rate and regular rhythm.      Pulses: Normal pulses.   Pulmonary:      Effort: Pulmonary effort is normal. No respiratory distress.      Breath sounds: Normal breath sounds.   Musculoskeletal:         General: Normal range of motion.      Cervical back: Normal range of motion and neck supple.      Right lower leg: No edema.      Left lower leg: No edema.   Skin:     General: Skin is warm and dry.      Capillary Refill: Capillary refill takes less than 2 seconds.   Neurological:      Mental Status: He is alert and oriented to person, place, and time. Mental status is at baseline.      Motor: No weakness.   Psychiatric:         Mood and Affect: Mood normal.         Behavior: Behavior normal.         Thought Content: Thought content normal.         Judgment: Judgment normal.         Results     Lab Results   Component Value Date    WBC 6.5 09/23/2022    RBC 5.87 09/23/2022    HGB 15.9 09/23/2022    HCT 51.5 09/23/2022    MCV 87.7 09/23/2022    MCH 27.1 09/23/2022    MCHC 30.9 (L) 09/23/2022    RDW 17.0 09/23/2022     09/23/2022    MPV 10.8 (H) 09/23/2022     Sodium Level   Date Value Ref Range Status   09/23/2022 140 136 - 145 mmol/L Final     Potassium Level   Date Value Ref Range Status   09/23/2022 3.7 3.5 - 5.1 mmol/L Final     Carbon Dioxide   Date Value Ref Range Status   09/23/2022 31 (H) 22 - 29 mmol/L Final     Blood Urea Nitrogen   Date Value Ref Range Status   09/23/2022 11.3 8.9 - 20.6 mg/dL Final     Creatinine   Date Value Ref Range Status   12/12/2022 0.66 (L) 0.73 - 1.18 mg/dL Final     Calcium Level Total   Date Value Ref Range Status   09/23/2022 9.1 8.4 - 10.2 mg/dL Final     Albumin Level   Date Value Ref Range Status   09/23/2022 3.7 3.5 - 5.0 gm/dL Final     Bilirubin Total   Date Value Ref Range Status   09/23/2022 0.6 <=1.5 mg/dL Final     Alkaline Phosphatase   Date Value Ref Range Status   09/23/2022 103 40 - 150 unit/L Final     Aspartate Aminotransferase   Date Value  Ref Range Status   09/23/2022 21 5 - 34 unit/L Final     Alanine Aminotransferase   Date Value Ref Range Status   09/23/2022 30 0 - 55 unit/L Final     Estimated GFR-Non    Date Value Ref Range Status   08/02/2022 >60 mls/min/1.73/m2 Final     Lab Results   Component Value Date    CHOL 83 07/30/2022     Lab Results   Component Value Date    HDL 15 (L) 07/30/2022     No results found for: LDLCALC  Lab Results   Component Value Date    TRIG 103 07/30/2022     No results found for: CHOLHDL  Lab Results   Component Value Date    TSH 3.2731 07/30/2022     Lab Results   Component Value Date    PROTEINUA Trace (A) 07/29/2022    LEUKOCYTESUR Negative 07/29/2022     Lab Results   Component Value Date    HGBA1C 6.2 11/02/2022    HGBA1C 6.6 07/30/2022     No results found for: MICROALBUR, [id number]   Results for orders placed in visit on 11/16/22    Diabetic Eye Screening Photo         Assessment       ICD-10-CM ICD-9-CM   1. Pulmonary nodules  R91.8 793.19   2. On home oxygen therapy  Z99.81 V46.2   3. Other chest pain  R07.89 786.59   4. Type 2 diabetes mellitus without complication, without long-term current use of insulin  E11.9 250.00   5. BMI 50.0-59.9, adult  Z68.43 V85.43   6. Hematochezia  K92.1 578.1   7. Gastroesophageal reflux disease without esophagitis  K21.9 530.81   8. Obesity hypoventilation syndrome  E66.2 278.03   9. Mediastinal lymphadenopathy  R59.0 785.6   10. Onychomycosis  B35.1 110.1       Plan       1. Pulmonary nodules  CT thorax 12/12/22-  resolved lung nodules and LAD.    2. On home oxygen therapy  Continue home oxygen/ BPAP.     3. Other chest pain  Evaluated by [facility] 3/7/23 for evaluation of CP without cardiac concerns.     4. Type 2 diabetes mellitus without complication, without long-term current use of insulin  Labs due; A1c 6.6% ; Prior A1c 6.2%  CBGs: not checking   Hypoglycemia episodes:  Microalbumin:   Educated on ADA diet: eliminate/decrease high carbohydrate  foods (rice, pasta, bread, potatoes (french fries, chips), candy, sweets, fruit juices, canned fruit, junk food, crackers, carbonated beverages)  Educated on health benefits of at least 5 days/ week of 30 minutes moderate intensity exercise (brisk walking) and 2 or more days/ week of muscle strength activities  Avoid alcohol or tobacco use  Eye exam: fundus photos 11/16/22 no DR  Foot exam: 11/16/22   Continue Victoza as prescribed   Encouraged CBG checks   - liraglutide 0.6 mg/0.1 mL, 18 mg/3 mL, subq PNIJ (VICTOZA 2-LOIS) 0.6 mg/0.1 mL (18 mg/3 mL) PnIj pen; Inject 0.6 mg into the skin once daily.  Dispense: 3 mL; Refill: 6    5. BMI 50.0-59.9, adult  BMI 59.63  Educated on increased risk of disease s/t obesity.  Educated on health benefits of at least 5 days/ week of 30 minutes moderate intensity exercise (brisk walking) and 2 or more days/ week of muscle strength activities (as tolerated).  Eat well balanced diet of fresh fruits/ vegetables, whole grains, lean meats and limit high carbohydrate foods.       6. Hematochezia  He was evaluated by [facility] 3/2/23 for evaluation for hematochezia- pending colonoscopy 9/28/23.     7. Gastroesophageal reflux disease without esophagitis  Increase omeprazole 40 mg (20 mg ineffective).   GERD diet and precautions discussed such as:  Avoid tobacco/ alcohol, NSAID use; Avoid spicy/ acidic foods, tomato sauce, jose, caffeine, chocolate, onions  Eat smaller meals; keep HOB elevated at least 2 hours after eating    - omeprazole (PRILOSEC) 40 MG capsule; Take 1 capsule (40 mg total) by mouth once daily.  Dispense: 90 capsule; Refill: 2    8. Obesity hypoventilation syndrome  Sleep study reviewed- severe HUSAM    9. Mediastinal lymphadenopathy  CT thorax 12/12/22-  resolved lung nodules and LAD.    10. Onychomycosis  Patient request refill for terbinafine which was helping.  - terbinafine HCL (LAMISIL) 250 mg tablet; Take 1 tablet (250 mg total) by mouth once daily.  Dispense: 30  tablet; Refill: 2      Future Appointments   Date Time Provider Department Center   11/16/2023  8:45 AM Sindi Vaughan NP [facility]        Follow up in about 6 months (around 11/16/2023) for with fasting labs before visit.    Signature:  Sindi Vaughan NP  [facility]  [address] [id number]    Date of encounter: 5/16/23

## 2023-05-31 NOTE — PROGRESS NOTES
Cardiology attending addendum  Patient's case discussed with cardiology fellow Dr. Jean Green . Agree with plan as outlined above.     Rosalinda Greenberg MD  [facility]iology[facility]

## 2023-09-26 ENCOUNTER — ANESTHESIA EVENT (OUTPATIENT)
Dept: ENDOSCOPY | Facility: HOSPITAL | Age: 33
End: 2023-09-26

## 2023-09-26 NOTE — ANESTHESIA PREPROCEDURE EVALUATION
"                                                                                                             09/26/2023  Tawanda Izaguirre is a 33 y.o., male for CLN screening  - history of rectal bleeding & DM2, + Cologaurd     Vitals:    09/28/23 0943 09/28/23 0944 09/28/23 1101   BP: (!) 145/89 (!) 145/89 (!) 125/91   Pulse: 65  84   Resp: 18     Temp: 36.4 °C (97.6 °F)     TempSrc: Oral     SpO2: 95%  96%   Weight: (!) 154.2 kg (340 lb)     Height: 5' 3" (1.6 m)       There were no vitals filed for this visit.        Active Ambulatory Problems     Diagnosis Date Noted    SOB (shortness of breath)     Obesity hypoventilation syndrome     Hepatic steatosis 11/02/2022    Other chest pain 11/02/2022    Pulmonary nodules 11/02/2022    Hematochezia 11/02/2022    Gastroesophageal reflux disease without esophagitis 11/02/2022    On home oxygen therapy 11/02/2022    Type 2 diabetes mellitus without complication, without long-term current use of insulin 11/16/2022    Mediastinal lymphadenopathy 11/16/2022    Onychomycosis 11/16/2022    Occult blood in stools 03/02/2023    Chronic constipation 03/02/2023    BMI 50.0-59.9, adult 05/16/2023     Resolved Ambulatory Problems     Diagnosis Date Noted    Cellulitis of suprapubic region     Acute hypercapnic respiratory failure     Morbid obesity 11/02/2022    Prediabetes 11/02/2022     Past Medical History:   Diagnosis Date    Chest pain     Type 2 diabetes mellitus without complications        No past surgical history on file.    Lab Results   Component Value Date    WBC 6.5 09/23/2022    HGB 15.9 09/23/2022    HCT 51.5 09/23/2022     09/23/2022    CHOL 83 07/30/2022    TRIG 103 07/30/2022    HDL 15 (L) 07/30/2022    ALT 24 05/16/2023    AST 18 05/16/2023     05/16/2023    K 4.6 05/16/2023    CREATININE 0.75 05/16/2023    BUN 10.5 05/16/2023    CO2 34 (H) 05/16/2023    TSH 3.2731 07/30/2022    HGBA1C 6.0 05/16/2023           Pre-op " Assessment    I have reviewed the Patient Summary Reports.     I have reviewed the Nursing Notes. I have reviewed the NPO Status.   I have reviewed the Medications.     Review of Systems  Anesthesia Hx:  No problems with previous Anesthesia  History of prior surgery of interest to airway management or planning: Denies Family Hx of Anesthesia complications.   Denies Personal Hx of Anesthesia complications.   Hematology/Oncology:  Hematology Normal   Oncology Normal     EENT/Dental:EENT/Dental Normal   Cardiovascular:  Cardiovascular Normal     Pulmonary:  Pulmonary Normal    Renal/:  Renal/ Normal     Hepatic/GI:  Hepatic/GI Normal    Musculoskeletal:  Musculoskeletal Normal    Neurological:  Neurology Normal    Endocrine:  Endocrine Normal    Dermatological:  Skin Normal    Psych:  Psychiatric Normal           Physical Exam  General: Well nourished, Cooperative, Alert and Oriented    Airway:  Mallampati: IV   Mouth Opening: Small, but > 3cm  TM Distance: Normal  Tongue: Large  Neck ROM: Normal ROM  Neck: Girth Increased    Dental:  Intact        Anesthesia Plan  Type of Anesthesia, risks & benefits discussed:    Anesthesia Type: Gen Natural Airway  Intra-op Monitoring Plan: Standard ASA Monitors  Post Op Pain Control Plan: IV/PO Opioids PRN  (medical reason for not using multimodal pain management)  Induction:  IV  Informed Consent: Informed consent signed with the Patient and all parties understand the risks and agree with anesthesia plan.  All questions answered. Patient consented to blood products? No  ASA Score: 4  Day of Surgery Review of History & Physical: H&P Update referred to the surgeon/provider.    Ready For Surgery From Anesthesia Perspective.     .

## 2023-09-28 ENCOUNTER — HOSPITAL ENCOUNTER (OUTPATIENT)
Facility: HOSPITAL | Age: 33
Discharge: HOME | End: 2023-09-28
Attending: INTERNAL MEDICINE | Admitting: INTERNAL MEDICINE

## 2023-09-28 ENCOUNTER — ANESTHESIA (OUTPATIENT)
Dept: ENDOSCOPY | Facility: HOSPITAL | Age: 33
End: 2023-09-28

## 2023-09-28 VITALS
HEIGHT: 63 IN | HEART RATE: 87 BPM | TEMPERATURE: 98 F | OXYGEN SATURATION: 95 % | RESPIRATION RATE: 18 BRPM | SYSTOLIC BLOOD PRESSURE: 138 MMHG | BODY MASS INDEX: 55.81 KG/M2 | DIASTOLIC BLOOD PRESSURE: 86 MMHG | WEIGHT: 315 LBS

## 2023-09-28 DIAGNOSIS — K57.30 DIVERTICULOSIS LARGE INTESTINE W/O PERFORATION OR ABSCESS W/O BLEEDING: Primary | ICD-10-CM

## 2023-09-28 DIAGNOSIS — K60.2 ANAL FISSURE: ICD-10-CM

## 2023-09-28 LAB
GLUCOSE SERPL-MCNC: 95 MG/DL (ref 70–110)
POCT GLUCOSE: 95 MG/DL (ref 70–110)

## 2023-09-28 PROCEDURE — 37000008 HC ANESTHESIA 1ST 15 MINUTES: Performed by: INTERNAL MEDICINE

## 2023-09-28 PROCEDURE — 63600175 PHARM REV CODE 636 W HCPCS: Performed by: NURSE ANESTHETIST, CERTIFIED REGISTERED

## 2023-09-28 PROCEDURE — 37000009 HC ANESTHESIA EA ADD 15 MINS: Performed by: INTERNAL MEDICINE

## 2023-09-28 PROCEDURE — 63600175 PHARM REV CODE 636 W HCPCS: Performed by: SPECIALIST

## 2023-09-28 PROCEDURE — 82962 GLUCOSE BLOOD TEST: CPT | Performed by: INTERNAL MEDICINE

## 2023-09-28 PROCEDURE — D9220A PRA ANESTHESIA: ICD-10-PCS | Mod: ,,, | Performed by: NURSE ANESTHETIST, CERTIFIED REGISTERED

## 2023-09-28 PROCEDURE — 45378 DIAGNOSTIC COLONOSCOPY: CPT | Mod: ,,, | Performed by: INTERNAL MEDICINE

## 2023-09-28 PROCEDURE — 45378 PR COLONOSCOPY,DIAGNOSTIC: ICD-10-PCS | Mod: ,,, | Performed by: INTERNAL MEDICINE

## 2023-09-28 PROCEDURE — 25000003 PHARM REV CODE 250: Performed by: NURSE ANESTHETIST, CERTIFIED REGISTERED

## 2023-09-28 PROCEDURE — D9220A PRA ANESTHESIA: Mod: ,,, | Performed by: NURSE ANESTHETIST, CERTIFIED REGISTERED

## 2023-09-28 PROCEDURE — 45378 DIAGNOSTIC COLONOSCOPY: CPT | Performed by: INTERNAL MEDICINE

## 2023-09-28 RX ORDER — GLYCOPYRROLATE 0.2 MG/ML
INJECTION INTRAMUSCULAR; INTRAVENOUS
Status: DISCONTINUED | OUTPATIENT
Start: 2023-09-28 | End: 2023-09-28

## 2023-09-28 RX ORDER — LIDOCAINE HYDROCHLORIDE 20 MG/ML
INJECTION INTRAVENOUS
Status: DISCONTINUED | OUTPATIENT
Start: 2023-09-28 | End: 2023-09-28

## 2023-09-28 RX ORDER — KETAMINE HCL IN 0.9 % NACL 50 MG/5 ML
SYRINGE (ML) INTRAVENOUS
Status: DISCONTINUED | OUTPATIENT
Start: 2023-09-28 | End: 2023-09-28

## 2023-09-28 RX ORDER — PROPOFOL 10 MG/ML
VIAL (ML) INTRAVENOUS
Status: DISCONTINUED | OUTPATIENT
Start: 2023-09-28 | End: 2023-09-28

## 2023-09-28 RX ORDER — SODIUM CHLORIDE, SODIUM LACTATE, POTASSIUM CHLORIDE, CALCIUM CHLORIDE 600; 310; 30; 20 MG/100ML; MG/100ML; MG/100ML; MG/100ML
INJECTION, SOLUTION INTRAVENOUS CONTINUOUS
Status: DISCONTINUED | OUTPATIENT
Start: 2023-09-28 | End: 2023-09-28 | Stop reason: HOSPADM

## 2023-09-28 RX ADMIN — PROPOFOL 20 MG: 10 INJECTION, EMULSION INTRAVENOUS at 10:09

## 2023-09-28 RX ADMIN — SODIUM CHLORIDE, POTASSIUM CHLORIDE, SODIUM LACTATE AND CALCIUM CHLORIDE: 600; 310; 30; 20 INJECTION, SOLUTION INTRAVENOUS at 09:09

## 2023-09-28 RX ADMIN — PROPOFOL 50 MG: 10 INJECTION, EMULSION INTRAVENOUS at 10:09

## 2023-09-28 RX ADMIN — GLYCOPYRROLATE 0.2 MG: 0.2 INJECTION INTRAMUSCULAR; INTRAVENOUS at 10:09

## 2023-09-28 RX ADMIN — LIDOCAINE HYDROCHLORIDE 50 MG: 20 INJECTION INTRAVENOUS at 10:09

## 2023-09-28 RX ADMIN — Medication 25 MG: at 10:09

## 2023-09-28 RX ADMIN — SODIUM CHLORIDE, POTASSIUM CHLORIDE, SODIUM LACTATE AND CALCIUM CHLORIDE: 600; 310; 30; 20 INJECTION, SOLUTION INTRAVENOUS at 10:09

## 2023-09-28 RX ADMIN — PROPOFOL 30 MG: 10 INJECTION, EMULSION INTRAVENOUS at 10:09

## 2023-09-28 NOTE — TRANSFER OF CARE
Anesthesia Transfer of Care Note    Patient: Tawanda Izaguirre    Procedure(s) Performed: Procedure(s) (LRB):  COLONOSCOPY (N/A)    Patient location: GI    Anesthesia Type: general    Post pain: adequate analgesia    Post assessment: no apparent anesthetic complications    Post vital signs: stable    Level of consciousness: awake    Nausea/Vomiting: no nausea/vomiting    Complications: none    Transfer of care protocol was followed      Last vitals:

## 2023-09-28 NOTE — ANESTHESIA POSTPROCEDURE EVALUATION
Anesthesia Post Evaluation    Patient: Tawanda Izaguirre    Procedure(s) Performed: Procedure(s) (LRB):  COLONOSCOPY (N/A)    Final Anesthesia Type: general      Patient location during evaluation: GI PACU  Patient participation: Yes- Able to Participate  Level of consciousness: awake and alert  Pain management: adequate  Airway patency: patent      Anesthetic complications: no      Cardiovascular status: hemodynamically stable  Respiratory status: unassisted, room air and spontaneous ventilation  Hydration status: euvolemic  Follow-up not needed.              No case tracking events are documented in the log.      Pain/Nusrat Score: Nusrat Score: 10 (9/28/2023 11:02 AM)

## 2023-09-28 NOTE — H&P
[facility]  Gastroenterology  H&P    Patient Name: Tawanda Izaguirre  MRN: [id number]  Admission Date: 9/28/2023  Code Status: Prior    Attending Provider: David Medley MD   Primary Care Physician: Sindi Vaughan NP  Principal Problem:<principal problem not specified>    Subjective:     History of Present Illness: 33y M w/pmhx of Home O2 use, DM, and class III morbid obesity presenting for BRPR. Patient notes BRPR after bowel movements, particularly after straining. He deneis any family history of colorectal cancer and he does not smoke. He has ~2 Bms per day. He staets the blood is in the bowl and on the toilet paper.    Past Medical History:   Diagnosis Date    Chest pain     Type 2 diabetes mellitus without complications        History reviewed. No pertinent surgical history.    Review of patient's allergies indicates:  No Known Allergies  Family History       Problem Relation (Age of Onset)    Diabetes Mellitus Mother    No Known Problems Father          Tobacco Use    Smoking status: Never    Smokeless tobacco: Never   Substance and Sexual Activity    Alcohol use: Not Currently     Comment: quit drinking 5 years ago    Drug use: Never    Sexual activity: Not on file     Review of Systems   Constitutional: Negative.    Gastrointestinal:  Positive for blood in stool.   All other systems reviewed and are negative.    Objective:     Vital Signs (Most Recent):    Vital Signs (24h Range):           There is no height or weight on file to calculate BMI.    No intake or output data in the 24 hours ending 09/28/23 0927    Lines/Drains/Airways       None                   Physical Exam  Constitutional:       Appearance: He is obese.   HENT:      Head: Normocephalic and atraumatic.      Nose: Nose normal.   Cardiovascular:      Rate and Rhythm: Normal rate and regular rhythm.   Pulmonary:      Effort: Pulmonary effort is normal.   Abdominal:      Palpations: Abdomen is soft.    Neurological:      Mental Status: He is alert.         Significant Labs:  None    Significant Imaging:  Imaging results within the past 24 hours have been reviewed.    Assessment/Plan:   33y M w/pmhx of DM and home O2 use presenting for colonoscopy    NPO. Prep complete. To endoscopy.      Jean Beltrán MD  Gastroenterology  [facility]

## 2023-09-28 NOTE — DISCHARGE SUMMARY
[facility]  Discharge Note  Short Stay    Procedure(s) (LRB):  COLONOSCOPY (N/A)  Consent was obtained, the patient was transferred to the endoscopy suite.  General IV anesthesia was provided by anesthesia Services.  Rectal exam revealed no palpable mass or other abnormalities.  The Olympus videocolonoscope was introduced per rectum and advanced around the colon to the cecum.  The ileocecal valve and appendiceal orifice were identified and normal.  The distal terminal ileum inspected and noted to be normal.  Careful examination of the colon ascending, transverse and descending colon.  There were few scattered varying size diverticula noted in the sigmoid colon.  In the rectum no significant hemorrhoids were noted.  There was a small anal fissure with minimal bright red blood noted on withdrawal of the colonoscope.  The endoscope was fully withdrawn.  Withdrawal time cecum to rectum 14 minutes.      Discharge plan the patient will resume his normal diet today and normal activities tomorrow.  If he continues to have rectal bleeding on a consistent basis he will be referred to colorectal surgery for further assessment of his anal fissure.  Otherwise a follow-up colonoscopy in 10 years is recommended.    OUTCOME: Patient tolerated treatment/procedure well without complication and is now ready for discharge.    DISPOSITION: Home or Self Care    FINAL DIAGNOSIS:  <principal problem not specified>    FOLLOWUP: With primary care provider    DISCHARGE INSTRUCTIONS:    Discharge Procedure Orders   Diet general     Call MD for:  temperature >100.4     Call MD for:  persistent nausea and vomiting     Call MD for:  severe uncontrolled pain     Call MD for:  difficulty breathing, headache or visual disturbances     Activity as tolerated         Clinical Reference Documents Added to Patient Instructions         Document    COLONOSCOPY DISCHARGE INSTRUCTIONS (Citizen of [place])            TIME SPENT ON DISCHARGE: 10  minutes

## 2023-09-28 NOTE — PROVATION PATIENT INSTRUCTIONS
Discharge Summary/Instructions after an Endoscopic Procedure  Patient Name: Tawanda Zendejas  Patient MRN: [id number]  Patient   YOB: 1990 Thursday, September 28, 2023  David Medley MD  Dear patient,  As a result of recent federal legislation (The Federal Cures Act), you may   receive lab or pathology results from your procedure in your [facility]   account before your physician is able to contact you. Your physician or   their representative will relay the results to you with their   recommendations at their soonest availability.  Thank you,  RESTRICTIONS:  During your procedure today, you received medications for sedation.  These   medications may affect your judgment, balance and coordination.  Therefore,   for 24 hours, you have the following restrictions:   - DO NOT drive a car, operate machinery, make legal/financial decisions,   sign important papers or drink alcohol.    ACTIVITY:  Today: no heavy lifting, straining or running due to procedural   sedation/anesthesia.  The following day: return to full activity including work.  DIET:  Eat and drink normally unless instructed otherwise.     TREATMENT FOR COMMON SIDE EFFECTS:  - Mild abdominal pain, nausea, belching, bloating or excessive gas:  rest,   eat lightly and use a heating pad.  - Sore Throat: treat with throat lozenges and/or gargle with warm salt   water.  - Because air was used during the procedure, expelling large amounts of air   from your rectum or belching is normal.  - If a bowel prep was taken, you may not have a bowel movement for 1-3 days.    This is normal.  SYMPTOMS TO WATCH FOR AND REPORT TO YOUR PHYSICIAN:  1. Abdominal pain or bloating, other than gas cramps.  2. Chest pain.  3. Back pain.  4. Signs of infection such as: chills or fever occurring within 24 hours   after the procedure.  5. Rectal bleeding, which would show as bright red, maroon, or black stools.   (A tablespoon of blood from the rectum is not serious,  especially if   hemorrhoids are present.)  6. Vomiting.  7. Weakness or dizziness.  GO DIRECTLY TO THE NEAREST EMERGENCY ROOM IF YOU HAVE ANY OF THE FOLLOWING:      Difficulty breathing              Chills and/or fever over 101 F   Persistent vomiting and/or vomiting blood   Severe abdominal pain   Severe chest pain   Black, tarry stools   Bleeding- more than one tablespoon   Any other symptom or condition that you feel may need urgent attention  Your doctor recommends these additional instructions:  If any biopsies were taken, your doctors clinic will contact you in 1 to 2   weeks with any results.  - Discharge patient to home (ambulatory).   - Resume regular diet today.   - Repeat colonoscopy in 10 years for screening purposes.  For questions, problems or results please call your physician - David Medley MD at Work:  [phone].  [facility] , EMERGENCY ROOM PHONE NUMBER: [phone]  IF A COMPLICATION OR EMERGENCY SITUATION ARISES AND YOU ARE UNABLE TO REACH   YOUR PHYSICIAN - GO DIRECTLY TO THE EMERGENCY ROOM.  MD David Rodriguez MD  9/28/2023 11:08:26 AM  This report has been verified and signed electronically.  Dear patient,  As a result of recent federal legislation (The Federal Cures Act), you may   receive lab or pathology results from your procedure in your [facility]   account before your physician is able to contact you. Your physician or   their representative will relay the results to you with their   recommendations at their soonest availability.  Thank you,  PROVATION

## 2023-11-20 ENCOUNTER — LAB VISIT (OUTPATIENT)
Dept: LAB | Facility: HOSPITAL | Age: 33
End: 2023-11-20
Attending: NURSE PRACTITIONER

## 2023-11-20 ENCOUNTER — CLINICAL SUPPORT (OUTPATIENT)
Dept: INTERNAL MEDICINE | Facility: CLINIC | Age: 33
End: 2023-11-20
Attending: NURSE PRACTITIONER

## 2023-11-20 ENCOUNTER — OFFICE VISIT (OUTPATIENT)
Dept: INTERNAL MEDICINE | Facility: CLINIC | Age: 33
End: 2023-11-20

## 2023-11-20 ENCOUNTER — TELEPHONE (OUTPATIENT)
Dept: INTERNAL MEDICINE | Facility: CLINIC | Age: 33
End: 2023-11-20

## 2023-11-20 VITALS
WEIGHT: 315 LBS | BODY MASS INDEX: 55.81 KG/M2 | HEIGHT: 63 IN | RESPIRATION RATE: 20 BRPM | TEMPERATURE: 98 F | DIASTOLIC BLOOD PRESSURE: 81 MMHG | SYSTOLIC BLOOD PRESSURE: 115 MMHG | HEART RATE: 77 BPM

## 2023-11-20 DIAGNOSIS — K76.0 HEPATIC STEATOSIS: ICD-10-CM

## 2023-11-20 DIAGNOSIS — K57.30 DIVERTICULOSIS LARGE INTESTINE W/O PERFORATION OR ABSCESS W/O BLEEDING: ICD-10-CM

## 2023-11-20 DIAGNOSIS — Z01.00 ENCOUNTER FOR DIABETES TYPE 2 EYE EXAM: ICD-10-CM

## 2023-11-20 DIAGNOSIS — E11.9 TYPE 2 DIABETES MELLITUS WITHOUT COMPLICATION, WITHOUT LONG-TERM CURRENT USE OF INSULIN: ICD-10-CM

## 2023-11-20 DIAGNOSIS — Z99.81 ON HOME OXYGEN THERAPY: ICD-10-CM

## 2023-11-20 DIAGNOSIS — K21.9 GASTROESOPHAGEAL REFLUX DISEASE WITHOUT ESOPHAGITIS: ICD-10-CM

## 2023-11-20 DIAGNOSIS — E11.9 TYPE 2 DIABETES MELLITUS WITHOUT COMPLICATION, WITHOUT LONG-TERM CURRENT USE OF INSULIN: Primary | ICD-10-CM

## 2023-11-20 DIAGNOSIS — E66.2 OBESITY HYPOVENTILATION SYNDROME: ICD-10-CM

## 2023-11-20 DIAGNOSIS — E11.9 ENCOUNTER FOR DIABETES TYPE 2 EYE EXAM: ICD-10-CM

## 2023-11-20 DIAGNOSIS — E11.9 ENCOUNTER FOR DIABETIC FOOT EXAM: ICD-10-CM

## 2023-11-20 LAB
ALBUMIN SERPL-MCNC: 4 G/DL (ref 3.5–5)
ALBUMIN/GLOB SERPL: 1 RATIO (ref 1.1–2)
ALP SERPL-CCNC: 126 UNIT/L (ref 40–150)
ALT SERPL-CCNC: 23 UNIT/L (ref 0–55)
AST SERPL-CCNC: 15 UNIT/L (ref 5–34)
BASOPHILS # BLD AUTO: 0.02 X10(3)/MCL
BASOPHILS NFR BLD AUTO: 0.3 %
BILIRUB SERPL-MCNC: 0.6 MG/DL
BUN SERPL-MCNC: 10.8 MG/DL (ref 8.9–20.6)
CALCIUM SERPL-MCNC: 9.5 MG/DL (ref 8.4–10.2)
CHLORIDE SERPL-SCNC: 101 MMOL/L (ref 98–107)
CHOLEST SERPL-MCNC: 153 MG/DL
CHOLEST/HDLC SERPL: 4 {RATIO} (ref 0–5)
CO2 SERPL-SCNC: 29 MMOL/L (ref 22–29)
CREAT SERPL-MCNC: 0.71 MG/DL (ref 0.73–1.18)
EOSINOPHIL # BLD AUTO: 0.18 X10(3)/MCL (ref 0–0.9)
EOSINOPHIL NFR BLD AUTO: 2.6 %
ERYTHROCYTE [DISTWIDTH] IN BLOOD BY AUTOMATED COUNT: 12.9 % (ref 11.5–17)
EST. AVERAGE GLUCOSE BLD GHB EST-MCNC: 122.6 MG/DL
GFR SERPLBLD CREATININE-BSD FMLA CKD-EPI: >60 MLS/MIN/1.73/M2
GLOBULIN SER-MCNC: 3.9 GM/DL (ref 2.4–3.5)
GLUCOSE SERPL-MCNC: 101 MG/DL (ref 74–100)
HBA1C MFR BLD: 5.9 %
HCT VFR BLD AUTO: 46.6 % (ref 42–52)
HDLC SERPL-MCNC: 35 MG/DL (ref 35–60)
HGB BLD-MCNC: 15 G/DL (ref 14–18)
IMM GRANULOCYTES # BLD AUTO: 0.03 X10(3)/MCL (ref 0–0.04)
IMM GRANULOCYTES NFR BLD AUTO: 0.4 %
LDLC SERPL CALC-MCNC: 88 MG/DL (ref 50–140)
LEFT EYE DM RETINOPATHY: NEGATIVE
LYMPHOCYTES # BLD AUTO: 1.7 X10(3)/MCL (ref 0.6–4.6)
LYMPHOCYTES NFR BLD AUTO: 24.5 %
MCH RBC QN AUTO: 28.5 PG (ref 27–31)
MCHC RBC AUTO-ENTMCNC: 32.2 G/DL (ref 33–36)
MCV RBC AUTO: 88.4 FL (ref 80–94)
MONOCYTES # BLD AUTO: 0.72 X10(3)/MCL (ref 0.1–1.3)
MONOCYTES NFR BLD AUTO: 10.4 %
NEUTROPHILS # BLD AUTO: 4.3 X10(3)/MCL (ref 2.1–9.2)
NEUTROPHILS NFR BLD AUTO: 61.8 %
NRBC BLD AUTO-RTO: 0 %
PLATELET # BLD AUTO: 246 X10(3)/MCL (ref 130–400)
PMV BLD AUTO: 10.6 FL (ref 7.4–10.4)
POTASSIUM SERPL-SCNC: 4.2 MMOL/L (ref 3.5–5.1)
PROT SERPL-MCNC: 7.9 GM/DL (ref 6.4–8.3)
RBC # BLD AUTO: 5.27 X10(6)/MCL (ref 4.7–6.1)
RIGHT EYE DM RETINOPATHY: NEGATIVE
SODIUM SERPL-SCNC: 138 MMOL/L (ref 136–145)
TRIGL SERPL-MCNC: 151 MG/DL (ref 34–140)
TSH SERPL-ACNC: 1.44 UIU/ML (ref 0.35–4.94)
VLDLC SERPL CALC-MCNC: 30 MG/DL
WBC # SPEC AUTO: 6.95 X10(3)/MCL (ref 4.5–11.5)

## 2023-11-20 PROCEDURE — 99215 OFFICE O/P EST HI 40 MIN: CPT | Mod: PBBFAC | Performed by: NURSE PRACTITIONER

## 2023-11-20 PROCEDURE — 83036 HEMOGLOBIN GLYCOSYLATED A1C: CPT

## 2023-11-20 PROCEDURE — 80061 LIPID PANEL: CPT

## 2023-11-20 PROCEDURE — 84443 ASSAY THYROID STIM HORMONE: CPT

## 2023-11-20 PROCEDURE — 36415 COLL VENOUS BLD VENIPUNCTURE: CPT

## 2023-11-20 PROCEDURE — 85025 COMPLETE CBC W/AUTO DIFF WBC: CPT

## 2023-11-20 PROCEDURE — 92228 IMG RTA DETC/MNTR DS PHY/QHP: CPT | Mod: PBBFAC

## 2023-11-20 PROCEDURE — 99214 PR OFFICE/OUTPT VISIT, EST, LEVL IV, 30-39 MIN: ICD-10-PCS | Mod: S$PBB,,, | Performed by: NURSE PRACTITIONER

## 2023-11-20 PROCEDURE — 80053 COMPREHEN METABOLIC PANEL: CPT

## 2023-11-20 PROCEDURE — 99214 OFFICE O/P EST MOD 30 MIN: CPT | Mod: S$PBB,,, | Performed by: NURSE PRACTITIONER

## 2023-11-20 RX ORDER — OMEPRAZOLE 40 MG/1
40 CAPSULE, DELAYED RELEASE ORAL DAILY
Qty: 90 CAPSULE | Refills: 2 | Status: SHIPPED | OUTPATIENT
Start: 2023-11-20 | End: 2024-11-19

## 2023-11-20 NOTE — TELEPHONE ENCOUNTER
Please let pt know diabetic eye exam did not show findings of retinopathy however image was blurred with artifact and recommend repeat photos therefore I ordered referral for him to establish with [facility] eye clinic for further evaluation. He should be contacted by their office for an appointment.     Thank you

## 2023-11-20 NOTE — ASSESSMENT & PLAN NOTE
Labs due  CBGs: not checking   Hypoglycemia episodes:  Microalbumin:   Educated on ADA diet: eliminate/decrease high carbohydrate foods (rice, pasta, bread, potatoes (french fries, chips), candy, sweets, fruit juices, canned fruit, junk food, crackers, carbonated beverages)  Educated on health benefits of at least 5 days/ week of 30 minutes moderate intensity exercise (brisk walking) and 2 or more days/ week of muscle strength activities  Avoid alcohol or tobacco use  Eye exam: fundus photos 11/16/22 no  Ordered 11/20/23  Foot exam: 11/20/23  Continue Victoza as prescribed   Encouraged CBG checks

## 2023-11-20 NOTE — ASSESSMENT & PLAN NOTE
Prior CT abd with findings- US liver ordered for f/u   Pt has DM, obesity  Low fat/ chol diet. Limit Tylenol 2 g/d. Avoid alcohol. Regular exercise as tolerated. Control of co-morbidities such as HTN, HLD, DM if applicable. US yearly for surveillance.

## 2023-11-20 NOTE — PROGRESS NOTES
Tawanda Izaguirre is a 33 y.o. male here for a diabetic eye screening with non-dilated fundus photos per EDDA Lassiter.    Patient cooperative?: Yes  Small pupils?: No  Last eye exam: unknown    For exam results, see Encounter Report.

## 2023-11-20 NOTE — ASSESSMENT & PLAN NOTE
BMI 58.7, weight lost 5 lb since last visit  Educated on increased risk of disease s/t obesity.  Educated on health benefits of at least 5 days/ week of 30 minutes moderate intensity exercise (brisk walking) and 2 or more days/ week of muscle strength activities (as tolerated).  Eat well balanced diet of fresh fruits/ vegetables, whole grains, lean meats and limit high carbohydrate foods.

## 2023-11-20 NOTE — PROGRESS NOTES
Sindi L Alexus, NP   [facility]  [address] [facility] [address]      PATIENT NAME: Tawanda Izaguirre  : 1990  DATE: 23  MRN: [id number]        Reason for Visit / Chief Complaint: Diabetes       History of Present Illness / Problem Focused Workflow     Tawanda Izaguirre presents to the clinic with Diabetes     34 yo  male for follow up. PMH includes atypical CP (stress test negative for ischemia), DM, obesity hypoventilation syndrome, on home oxygen, hepatic steatosis, GERD, hemorrhoids/ rectal bleeding.  Labs not completed but is fasting and will complete today.  He is compliant with home oxygen therapy.  He is due for diabetic eye and foot exam today.  He denies any bloody stools or abdominal pain.  He denies any chest pain or shortness of breath.    Other providers:   [facility]- 3/7/23- f/u prn   [facility] - colonoscopy completed 2023        Review of Systems     Review of Systems   Constitutional:  Negative for appetite change, chills, fatigue, fever and unexpected weight change.   HENT:  Negative for congestion, ear pain, hearing loss, sinus pressure, sore throat and tinnitus.    Respiratory:  Negative for cough, chest tightness, shortness of breath and wheezing.    Cardiovascular:  Negative for chest pain, palpitations and leg swelling.   Gastrointestinal:  Negative for abdominal distention, abdominal pain, blood in stool, constipation, diarrhea, nausea and vomiting.   Genitourinary:  Negative for dysuria and hematuria.   Musculoskeletal:  Negative for arthralgias and myalgias.   Skin:  Negative for pallor.   Allergic/Immunologic: Negative for environmental allergies.   Neurological:  Negative for dizziness, syncope, weakness, numbness and headaches.   Hematological:  Negative for adenopathy. Does not bruise/bleed easily.   Psychiatric/Behavioral:  Negative for agitation, behavioral problems, confusion,  "hallucinations, sleep disturbance and suicidal ideas. The patient is not nervous/anxious.        Medical / Social / Family History     ----------------------------  Chest pain  Type 2 diabetes mellitus without complications     Past Surgical History:   Procedure Laterality Date    COLONOSCOPY N/A 9/28/2023    Procedure: COLONOSCOPY;  Surgeon: Galindo, David QUAN MD;  Location: [facility] ENDOSCOPY;  Service: Endoscopy;  Laterality: N/A;       Social History     Socioeconomic History    Marital status: Single   Tobacco Use    Smoking status: Never    Smokeless tobacco: Never   Substance and Sexual Activity    Alcohol use: Not Currently     Comment: quit drinking 5 years ago    Drug use: Never     Social Determinants of Health     Physical Activity: Inactive (11/2/2022)    Exercise Vital Sign     Days of Exercise per Week: 0 days     Minutes of Exercise per Session: 0 min        Family History   Problem Relation Age of Onset    Diabetes Mellitus Mother     No Known Problems Father         Medications and Allergies     Medications  Current Outpatient Medications   Medication Instructions    blood sugar diagnostic Strp To check BG once daily, to use with insurance preferred meter    blood-glucose meter kit To check BG once daily, to use with insurance preferred meter    lancets Misc To check BG once daily, to use with insurance preferred meter    liraglutide 0.6 mg/0.1 mL (18 mg/3 mL) subq PNIJ (VICTOZA 2-LOIS) 0.6 mg, Subcutaneous, Daily    omeprazole (PRILOSEC) 40 mg, Oral, Daily    TECHLITE PEN NEEDLE 32 gauge x 5/32" Ndle Use one pen needle for victoza injection.    terbinafine HCL (LAMISIL) 250 mg, Oral, Daily       Allergies  Review of patient's allergies indicates:  No Known Allergies    Physical Examination     Visit Vitals  /81 (BP Location: Left arm, Patient Position: Sitting, BP Method: X-Large (Automatic))   Pulse 77   Temp 98.1 °F (36.7 °C) (Oral)   Resp 20   Ht 5' 3" (1.6 m)   Wt (!) 150.3 kg (331 lb 6.4 " oz)   BMI 58.70 kg/m²       Physical Exam  Constitutional:       General: He is not in acute distress.     Appearance: Normal appearance. He is normal weight. He is not ill-appearing, toxic-appearing or diaphoretic.   HENT:      Head: Normocephalic.   Cardiovascular:      Rate and Rhythm: Normal rate and regular rhythm.      Pulses:           Dorsalis pedis pulses are 2+ on the right side and 2+ on the left side.        Posterior tibial pulses are 2+ on the right side and 2+ on the left side.   Pulmonary:      Effort: Pulmonary effort is normal. No respiratory distress.      Breath sounds: Normal breath sounds.   Feet:      Right foot:      Protective Sensation: 5 sites tested.  5 sites sensed.      Skin integrity: Skin integrity normal.      Left foot:      Protective Sensation: 5 sites tested.  5 sites sensed.      Skin integrity: Skin integrity normal.   Skin:     General: Skin is warm and dry.   Neurological:      General: No focal deficit present.      Mental Status: He is alert and oriented to person, place, and time. Mental status is at baseline.      Motor: No weakness.      Coordination: Coordination normal.      Gait: Gait normal.   Psychiatric:         Mood and Affect: Mood normal.         Behavior: Behavior normal.         Thought Content: Thought content normal.         Judgment: Judgment normal.           Results     Lab Results   Component Value Date    WBC 6.95 11/20/2023    RBC 5.27 11/20/2023    HGB 15.0 11/20/2023    HCT 46.6 11/20/2023    MCV 88.4 11/20/2023    MCH 28.5 11/20/2023    MCHC 32.2 (L) 11/20/2023    RDW 12.9 11/20/2023     11/20/2023    MPV 10.6 (H) 11/20/2023     Sodium Level   Date Value Ref Range Status   11/20/2023 138 136 - 145 mmol/L Final     Potassium Level   Date Value Ref Range Status   11/20/2023 4.2 3.5 - 5.1 mmol/L Final     Carbon Dioxide   Date Value Ref Range Status   11/20/2023 29 22 - 29 mmol/L Final     Blood Urea Nitrogen   Date Value Ref Range Status  "  11/20/2023 10.8 8.9 - 20.6 mg/dL Final     Creatinine   Date Value Ref Range Status   11/20/2023 0.71 (L) 0.73 - 1.18 mg/dL Final     Calcium Level Total   Date Value Ref Range Status   11/20/2023 9.5 8.4 - 10.2 mg/dL Final     Albumin Level   Date Value Ref Range Status   11/20/2023 4.0 3.5 - 5.0 g/dL Final     Bilirubin Total   Date Value Ref Range Status   11/20/2023 0.6 <=1.5 mg/dL Final     Alkaline Phosphatase   Date Value Ref Range Status   11/20/2023 126 40 - 150 unit/L Final     Aspartate Aminotransferase   Date Value Ref Range Status   11/20/2023 15 5 - 34 unit/L Final     Alanine Aminotransferase   Date Value Ref Range Status   11/20/2023 23 0 - 55 unit/L Final     Estimated GFR-Non    Date Value Ref Range Status   08/02/2022 >60 mls/min/1.73/m2 Final     Lab Results   Component Value Date    CHOL 153 11/20/2023     Lab Results   Component Value Date    HDL 35 11/20/2023     No results found for: "LDLCALC"  Lab Results   Component Value Date    TRIG 151 (H) 11/20/2023     No results found for: "CHOLHDL"  Lab Results   Component Value Date    TSH 1.443 11/20/2023     Lab Results   Component Value Date    PROTEINUA Trace (A) 07/29/2022    LEUKOCYTESUR Negative 07/29/2022     Lab Results   Component Value Date    HGBA1C 5.9 11/20/2023    HGBA1C 6.0 05/16/2023    HGBA1C 6.2 11/02/2022     No results found for: "MICROALBUR", "[id number]"   Results for orders placed in visit on 11/16/22    Diabetic Eye Screening Photo         Assessment       ICD-10-CM ICD-9-CM   1. Type 2 diabetes mellitus without complication, without long-term current use of insulin  E11.9 250.00   2. Encounter for diabetic foot exam  E11.9 250.00   3. Encounter for diabetes type 2 eye exam  Z01.00 V72.0    E11.9 250.00   4. Hepatic steatosis  K76.0 571.8   5. Gastroesophageal reflux disease without esophagitis  K21.9 530.81   6. BMI 50.0-59.9, adult  Z68.43 V85.43   7. Diverticulosis large intestine w/o perforation or " abscess w/o bleeding  K57.30 562.10   8. Obesity hypoventilation syndrome  E66.2 278.03   9. On home oxygen therapy  Z99.81 V46.2       Plan       Problem List Items Addressed This Visit          Ophtho    Encounter for diabetes type 2 eye exam       Pulmonary    Obesity hypoventilation syndrome    Current Assessment & Plan     Continue home oxygen therapy         On home oxygen therapy       Endocrine    BMI 50.0-59.9, adult    Current Assessment & Plan     BMI 58.7, weight lost 5 lb since last visit  Educated on increased risk of disease s/t obesity.  Educated on health benefits of at least 5 days/ week of 30 minutes moderate intensity exercise (brisk walking) and 2 or more days/ week of muscle strength activities (as tolerated).  Eat well balanced diet of fresh fruits/ vegetables, whole grains, lean meats and limit high carbohydrate foods.            Encounter for diabetic foot exam    Type 2 diabetes mellitus without complication, without long-term current use of insulin - Primary    Current Assessment & Plan     Labs due  CBGs: not checking   Hypoglycemia episodes:  Microalbumin:   Educated on ADA diet: eliminate/decrease high carbohydrate foods (rice, pasta, bread, potatoes (french fries, chips), candy, sweets, fruit juices, canned fruit, junk food, crackers, carbonated beverages)  Educated on health benefits of at least 5 days/ week of 30 minutes moderate intensity exercise (brisk walking) and 2 or more days/ week of muscle strength activities  Avoid alcohol or tobacco use  Eye exam: fundus photos 11/16/22 no DR. Ordered 11/20/23  Foot exam: 11/20/23  Continue Victoza as prescribed   Encouraged CBG checks          Relevant Medications    liraglutide 0.6 mg/0.1 mL, 18 mg/3 mL, subq PNIJ (VICTOZA 2-LOIS) 0.6 mg/0.1 mL (18 mg/3 mL) PnIj pen    Other Relevant Orders    Diabetic Eye Screening Photo (Completed)       GI    Diverticulosis large intestine w/o perforation or abscess w/o bleeding    Current Assessment &  Plan     09/28/2023 colonoscopy findings         Gastroesophageal reflux disease without esophagitis    Current Assessment & Plan     Controlled.  Continue PPI         Relevant Medications    omeprazole (PRILOSEC) 40 MG capsule    Hepatic steatosis    Current Assessment & Plan     Prior CT abd with findings- US liver ordered for f/u   Pt has DM, obesity  Low fat/ chol diet. Limit Tylenol 2 g/d. Avoid alcohol. Regular exercise as tolerated. Control of co-morbidities such as HTN, HLD, DM if applicable. US yearly for surveillance.             Relevant Orders    US Abdomen Limited_Liver    Ambulatory referral/consult to Gastroenterology       Future Appointments   Date Time Provider Department Center   5/20/2024  9:00 AM Sindi Vaughan NP [facility]        Follow up in about 6 months (around 5/20/2024) for with fasting labs before visit.    Signature:  Sindi Vaughan NP  [facility]  [address] [facility] [address]    Date of encounter: 11/20/23

## 2023-12-04 ENCOUNTER — TELEPHONE (OUTPATIENT)
Dept: INTERNAL MEDICINE | Facility: CLINIC | Age: 33
End: 2023-12-04

## 2023-12-04 DIAGNOSIS — E11.9 TYPE 2 DIABETES MELLITUS WITHOUT COMPLICATION, WITHOUT LONG-TERM CURRENT USE OF INSULIN: Primary | ICD-10-CM

## 2023-12-04 DIAGNOSIS — E78.2 ELEVATED TRIGLYCERIDES WITH HIGH CHOLESTEROL: ICD-10-CM

## 2023-12-04 DIAGNOSIS — K76.0 HEPATIC STEATOSIS: ICD-10-CM

## 2023-12-04 DIAGNOSIS — E78.5 HYPERLIPIDEMIA LDL GOAL <70: ICD-10-CM

## 2023-12-04 NOTE — TELEPHONE ENCOUNTER
Please inform patient lab results reviewed.  Diabetes level has improved to 5.9%.  Please encourage patient to continue with Victoza injections daily.  Rx refilled at his last visit.  Cholesterol levels are mildly elevated.  Encouraged him to follow low-fat and cholesterol diet.  I ordered referral to dietician for assistance in healthier diet choices. We will recheck levels with his next visit and if cholesterol levels do not lower, we will discuss adding a cholesterol-lowering medication.      Please let me know if he has any questions or concerns.  Thank you

## 2023-12-05 NOTE — TELEPHONE ENCOUNTER
Called . Name: Cuong                              ID#: [id number]    Informed pt. Lab results reviewed diabetes level has improved. Encouraged patient to continue with Victoza injections daily, and notified him Rx was refilled at his last visit.  Pt stated he is taking daily.  Informed pt. cholesterol levels are mildly elevated and Sindi  ordered referral to dietician for assistance in healthier diet choices. Encouraged him to follow low-fat and cholesterol diet as well . Made pt aware cholesterol levels will be rechecked  with his next visit and if cholesterol levels do not lower, Sindi will discuss adding a cholesterol-lowering medication. Encouraged pt to call clinic if he has any questions or concerns . Patient verbalized understanding.

## 2023-12-05 NOTE — TELEPHONE ENCOUNTER
Called . Name : Ronnie                                ID #: [id number]    Informed pt diabetic eye exam did not show findings of retinopathy however image was blurred with artifact and Sindi recommended repeat photos . Informed pt referral for him to establish with [facility] eye clinic for further evaluation was ordered and  he should be contacted by their office for an appointment. Patient verbalized understanding

## 2023-12-06 ENCOUNTER — HOSPITAL ENCOUNTER (OUTPATIENT)
Dept: RADIOLOGY | Facility: HOSPITAL | Age: 33
Discharge: HOME | End: 2023-12-06
Attending: NURSE PRACTITIONER

## 2023-12-06 DIAGNOSIS — K76.0 HEPATIC STEATOSIS: ICD-10-CM

## 2023-12-06 PROCEDURE — 76705 ECHO EXAM OF ABDOMEN: CPT | Mod: TC

## 2023-12-07 ENCOUNTER — TELEPHONE (OUTPATIENT)
Dept: INTERNAL MEDICINE | Facility: CLINIC | Age: 33
End: 2023-12-07

## 2023-12-07 NOTE — TELEPHONE ENCOUNTER
Please let pt know liver ultrasound showed findings of fatty liver and gallstones (without obstruction). If patient experiences any RUQ abd pain, n/v/d, fever, chills- patient should notify provider and/or report to ER if needed for further evaluation.     Patient was referred to [facility] clinic for fatty liver.     Thank you

## 2024-04-02 ENCOUNTER — TELEPHONE (OUTPATIENT)
Dept: NUTRITION | Facility: HOSPITAL | Age: 34
End: 2024-04-02

## 2024-04-02 NOTE — TELEPHONE ENCOUNTER
" ID# [id number]    Pt contacted to reschedule nutrition class to individual appointment so  can be utilized.  informed RDN that the call took a while to go through and "the subscriber is not in service" via the phone number provided from pt's contact information.   "

## 2024-04-29 DIAGNOSIS — E11.9 TYPE 2 DIABETES MELLITUS WITHOUT COMPLICATION, WITHOUT LONG-TERM CURRENT USE OF INSULIN: ICD-10-CM

## 2024-04-29 RX ORDER — PEN NEEDLE, DIABETIC 32GX 5/32"
NEEDLE, DISPOSABLE MISCELLANEOUS
Qty: 100 EACH | Refills: 3 | Status: SHIPPED | OUTPATIENT
Start: 2024-04-29 | End: 2024-05-20 | Stop reason: SDUPTHER

## 2024-05-20 ENCOUNTER — OFFICE VISIT (OUTPATIENT)
Dept: INTERNAL MEDICINE | Facility: CLINIC | Age: 34
End: 2024-05-20

## 2024-05-20 ENCOUNTER — LAB VISIT (OUTPATIENT)
Dept: LAB | Facility: HOSPITAL | Age: 34
End: 2024-05-20
Attending: NURSE PRACTITIONER

## 2024-05-20 VITALS
HEART RATE: 79 BPM | DIASTOLIC BLOOD PRESSURE: 86 MMHG | TEMPERATURE: 98 F | SYSTOLIC BLOOD PRESSURE: 134 MMHG | WEIGHT: 315 LBS | RESPIRATION RATE: 18 BRPM | BODY MASS INDEX: 55.81 KG/M2 | OXYGEN SATURATION: 99 % | HEIGHT: 63 IN

## 2024-05-20 DIAGNOSIS — R06.02 SOB (SHORTNESS OF BREATH): ICD-10-CM

## 2024-05-20 DIAGNOSIS — E78.2 ELEVATED TRIGLYCERIDES WITH HIGH CHOLESTEROL: ICD-10-CM

## 2024-05-20 DIAGNOSIS — Z87.898 HISTORY OF WHEEZING: ICD-10-CM

## 2024-05-20 DIAGNOSIS — H53.8 BLURRED VISION: ICD-10-CM

## 2024-05-20 DIAGNOSIS — K92.1 HEMATOCHEZIA: ICD-10-CM

## 2024-05-20 DIAGNOSIS — E78.5 HYPERLIPIDEMIA LDL GOAL <70: ICD-10-CM

## 2024-05-20 DIAGNOSIS — K59.09 CHRONIC CONSTIPATION: ICD-10-CM

## 2024-05-20 DIAGNOSIS — K76.0 HEPATIC STEATOSIS: ICD-10-CM

## 2024-05-20 DIAGNOSIS — E11.9 TYPE 2 DIABETES MELLITUS WITHOUT COMPLICATION, WITHOUT LONG-TERM CURRENT USE OF INSULIN: ICD-10-CM

## 2024-05-20 DIAGNOSIS — E66.2 OBESITY HYPOVENTILATION SYNDROME: ICD-10-CM

## 2024-05-20 DIAGNOSIS — E11.9 TYPE 2 DIABETES MELLITUS WITHOUT COMPLICATION, WITHOUT LONG-TERM CURRENT USE OF INSULIN: Primary | ICD-10-CM

## 2024-05-20 DIAGNOSIS — K21.9 GASTROESOPHAGEAL REFLUX DISEASE WITHOUT ESOPHAGITIS: ICD-10-CM

## 2024-05-20 DIAGNOSIS — Z99.81 ON HOME OXYGEN THERAPY: ICD-10-CM

## 2024-05-20 PROBLEM — R91.8 PULMONARY NODULES: Status: RESOLVED | Noted: 2022-11-02 | Resolved: 2024-05-20

## 2024-05-20 PROBLEM — R59.0 MEDIASTINAL LYMPHADENOPATHY: Status: RESOLVED | Noted: 2022-11-16 | Resolved: 2024-05-20

## 2024-05-20 PROBLEM — Z01.00 ENCOUNTER FOR DIABETES TYPE 2 EYE EXAM: Status: RESOLVED | Noted: 2023-11-20 | Resolved: 2024-05-20

## 2024-05-20 LAB
ALBUMIN SERPL-MCNC: 3.6 G/DL (ref 3.5–5)
ALBUMIN/GLOB SERPL: 1 RATIO (ref 1.1–2)
ALP SERPL-CCNC: 111 UNIT/L (ref 40–150)
ALT SERPL-CCNC: 20 UNIT/L (ref 0–55)
ANION GAP SERPL CALC-SCNC: 7 MEQ/L
AST SERPL-CCNC: 12 UNIT/L (ref 5–34)
BASOPHILS # BLD AUTO: 0.01 X10(3)/MCL
BASOPHILS NFR BLD AUTO: 0.2 %
BILIRUB SERPL-MCNC: 0.3 MG/DL
BUN SERPL-MCNC: 12 MG/DL (ref 8.9–20.6)
CALCIUM SERPL-MCNC: 9 MG/DL (ref 8.4–10.2)
CHLORIDE SERPL-SCNC: 102 MMOL/L (ref 98–107)
CHOLEST SERPL-MCNC: 119 MG/DL
CHOLEST/HDLC SERPL: 4 {RATIO} (ref 0–5)
CO2 SERPL-SCNC: 30 MMOL/L (ref 22–29)
CREAT SERPL-MCNC: 0.67 MG/DL (ref 0.73–1.18)
CREAT UR-MCNC: 69.4 MG/DL (ref 63–166)
CREAT/UREA NIT SERPL: 18
EOSINOPHIL # BLD AUTO: 0.21 X10(3)/MCL (ref 0–0.9)
EOSINOPHIL NFR BLD AUTO: 3.4 %
ERYTHROCYTE [DISTWIDTH] IN BLOOD BY AUTOMATED COUNT: 14 % (ref 11.5–17)
EST. AVERAGE GLUCOSE BLD GHB EST-MCNC: 125.5 MG/DL
GFR SERPLBLD CREATININE-BSD FMLA CKD-EPI: >60 ML/MIN/1.73/M2
GLOBULIN SER-MCNC: 3.5 GM/DL (ref 2.4–3.5)
GLUCOSE SERPL-MCNC: 104 MG/DL (ref 74–100)
HBA1C MFR BLD: 6 %
HCT VFR BLD AUTO: 43.9 % (ref 42–52)
HDLC SERPL-MCNC: 31 MG/DL (ref 35–60)
HGB BLD-MCNC: 14.1 G/DL (ref 14–18)
IMM GRANULOCYTES # BLD AUTO: 0.03 X10(3)/MCL (ref 0–0.04)
IMM GRANULOCYTES NFR BLD AUTO: 0.5 %
LDLC SERPL CALC-MCNC: 64 MG/DL (ref 50–140)
LYMPHOCYTES # BLD AUTO: 1.64 X10(3)/MCL (ref 0.6–4.6)
LYMPHOCYTES NFR BLD AUTO: 26.7 %
MCH RBC QN AUTO: 28 PG (ref 27–31)
MCHC RBC AUTO-ENTMCNC: 32.1 G/DL (ref 33–36)
MCV RBC AUTO: 87.1 FL (ref 80–94)
MICROALBUMIN UR-MCNC: <5 UG/ML
MICROALBUMIN/CREAT RATIO PNL UR: NORMAL
MONOCYTES # BLD AUTO: 0.63 X10(3)/MCL (ref 0.1–1.3)
MONOCYTES NFR BLD AUTO: 10.2 %
NEUTROPHILS # BLD AUTO: 3.63 X10(3)/MCL (ref 2.1–9.2)
NEUTROPHILS NFR BLD AUTO: 59 %
NRBC BLD AUTO-RTO: 0 %
PLATELET # BLD AUTO: 208 X10(3)/MCL (ref 130–400)
PMV BLD AUTO: 10.8 FL (ref 7.4–10.4)
POTASSIUM SERPL-SCNC: 4.1 MMOL/L (ref 3.5–5.1)
PROT SERPL-MCNC: 7.1 GM/DL (ref 6.4–8.3)
RBC # BLD AUTO: 5.04 X10(6)/MCL (ref 4.7–6.1)
SODIUM SERPL-SCNC: 139 MMOL/L (ref 136–145)
TRIGL SERPL-MCNC: 121 MG/DL (ref 34–140)
VLDLC SERPL CALC-MCNC: 24 MG/DL
WBC # SPEC AUTO: 6.15 X10(3)/MCL (ref 4.5–11.5)

## 2024-05-20 PROCEDURE — 83036 HEMOGLOBIN GLYCOSYLATED A1C: CPT

## 2024-05-20 PROCEDURE — 99214 OFFICE O/P EST MOD 30 MIN: CPT | Mod: S$PBB,,, | Performed by: NURSE PRACTITIONER

## 2024-05-20 PROCEDURE — 80061 LIPID PANEL: CPT

## 2024-05-20 PROCEDURE — 80053 COMPREHEN METABOLIC PANEL: CPT

## 2024-05-20 PROCEDURE — 85025 COMPLETE CBC W/AUTO DIFF WBC: CPT

## 2024-05-20 PROCEDURE — 82043 UR ALBUMIN QUANTITATIVE: CPT

## 2024-05-20 PROCEDURE — 36415 COLL VENOUS BLD VENIPUNCTURE: CPT

## 2024-05-20 PROCEDURE — 99215 OFFICE O/P EST HI 40 MIN: CPT | Mod: PBBFAC | Performed by: NURSE PRACTITIONER

## 2024-05-20 RX ORDER — PEN NEEDLE, DIABETIC 32GX 5/32"
NEEDLE, DISPOSABLE MISCELLANEOUS
Qty: 100 EACH | Refills: 3 | Status: SHIPPED | OUTPATIENT
Start: 2024-05-20

## 2024-05-20 RX ORDER — ALBUTEROL SULFATE 90 UG/1
2 AEROSOL, METERED RESPIRATORY (INHALATION) EVERY 6 HOURS PRN
Qty: 18 G | Refills: 0 | Status: SHIPPED | OUTPATIENT
Start: 2024-05-20 | End: 2025-05-20

## 2024-05-20 RX ORDER — OMEPRAZOLE 40 MG/1
40 CAPSULE, DELAYED RELEASE ORAL DAILY
Qty: 90 CAPSULE | Refills: 2 | Status: SHIPPED | OUTPATIENT
Start: 2024-05-20 | End: 2025-05-20

## 2024-05-20 NOTE — PROGRESS NOTES
Sindi L Alexus, NP   [facility]  [address] [facility] [address]      PATIENT NAME: Tawanda Izaguirre  : 1990  DATE: 24  MRN: [id number]        History of Present Illness / Problem Focused Workflow     Tawanda Izaguirre presents to the clinic with Follow-up and Diabetes     HPI: 33 yo  male for follow up/ lab results. PMH includes atypical CP (stress test negative for ischemia), pulmonary nodules and mediastinal lymphadenopathy (resolved), DM, obesity hypoventilation syndrome, on home oxygen, hepatic steatosis, GERD, hemorrhoids/ rectal bleeding. Labs completed and pending. A1c returned with A1c 6%. Continues with bloody stools and brbpr with hard stools. Denies treatment for constipation. He does endorse blurred vision at times. Does not check sugars. He does endorse shortness of breath when he is working and around smoke.  Otherwise denies symptoms and denies any chest pain.  Continues to use oxygen at night while sleeping.  Otherwise he denies any other concerns or complaints.    Other providers:   [facility]iology- 3/7/23- f/u prn   [facility] GI - colonoscopy completed 2023    Review of Systems     Review of Systems   Constitutional: Negative.    HENT: Negative.     Eyes:  Positive for visual disturbance.   Respiratory:  Positive for shortness of breath and wheezing.    Cardiovascular: Negative.    Gastrointestinal:  Positive for blood in stool and constipation.   Endocrine: Negative.    Genitourinary: Negative.    Musculoskeletal: Negative.    Skin: Negative.    Allergic/Immunologic: Negative.    Neurological: Negative.    Hematological: Negative.    Psychiatric/Behavioral: Negative.         Medical / Social / Family History     -------------------------------------    Chest pain    Mediastinal lymphadenopathy    Pulmonary nodules    Type 2 diabetes mellitus without complications        Past Surgical History:  "  Procedure Laterality Date    COLONOSCOPY N/A 9/28/2023    Procedure: COLONOSCOPY;  Surgeon: Galindo, David QUAN MD;  Location: [facility] ENDOSCOPY;  Service: Endoscopy;  Laterality: N/A;       Social History     Socioeconomic History    Marital status: Single   Tobacco Use    Smoking status: Never    Smokeless tobacco: Never   Substance and Sexual Activity    Alcohol use: Not Currently     Comment: quit drinking 5 years ago    Drug use: Never     Social Determinants of Health     Physical Activity: Inactive (11/2/2022)    Exercise Vital Sign     Days of Exercise per Week: 0 days     Minutes of Exercise per Session: 0 min        Family History   Problem Relation Name Age of Onset    Diabetes Mellitus Mother      No Known Problems Father          Medications and Allergies     Medications  Current Outpatient Medications   Medication Instructions    albuterol (PROVENTIL HFA) 90 mcg/actuation inhaler 2 puffs, Inhalation, Every 6 hours PRN, Rescue    blood sugar diagnostic Strp To check BG once daily, to use with insurance preferred meter    blood-glucose meter kit To check BG once daily, to use with insurance preferred meter    lancets Misc To check BG once daily, to use with insurance preferred meter    liraglutide 0.6 mg/0.1 mL (18 mg/3 mL) subq PNIJ (VICTOZA 2-LOIS) 0.6 mg, Subcutaneous, Daily    omeprazole (PRILOSEC) 40 mg, Oral, Daily    TECHLITE PEN NEEDLE 32 gauge x 5/32" Ndle Use once daily for Victoza injection    terbinafine HCL (LAMISIL) 250 mg, Oral, Daily       Allergies  Review of patient's allergies indicates:  No Known Allergies    Physical Examination     Visit Vitals  /86 (BP Location: Left arm, Patient Position: Sitting, BP Method: Large (Automatic))   Pulse 79   Temp 98.1 °F (36.7 °C) (Oral)   Resp 18   Ht 5' 3" (1.6 m)   Wt (!) 158.8 kg (350 lb)   SpO2 99%   BMI 62.00 kg/m²       Physical Exam  Constitutional:       General: He is not in acute distress.     Appearance: Normal appearance. He is " obese. He is not ill-appearing, toxic-appearing or diaphoretic.   HENT:      Head: Normocephalic.   Neck:      Thyroid: No thyroid mass, thyromegaly or thyroid tenderness.   Cardiovascular:      Rate and Rhythm: Normal rate and regular rhythm.   Pulmonary:      Effort: Pulmonary effort is normal. No respiratory distress.      Breath sounds: Normal breath sounds. No wheezing.   Abdominal:      General: There is no distension.      Tenderness: There is no abdominal tenderness.   Lymphadenopathy:      Cervical: No cervical adenopathy.   Skin:     General: Skin is warm and dry.   Neurological:      General: No focal deficit present.      Mental Status: He is alert and oriented to person, place, and time. Mental status is at baseline.      Motor: No weakness.      Coordination: Coordination normal.      Gait: Gait normal.   Psychiatric:         Mood and Affect: Mood normal.         Behavior: Behavior normal.         Thought Content: Thought content normal.         Judgment: Judgment normal.           Results     Lab Results   Component Value Date    WBC 6.15 05/20/2024    RBC 5.04 05/20/2024    HGB 14.1 05/20/2024    HCT 43.9 05/20/2024    MCV 87.1 05/20/2024    MCH 28.0 05/20/2024    MCHC 32.1 (L) 05/20/2024    RDW 14.0 05/20/2024     05/20/2024    MPV 10.8 (H) 05/20/2024     Sodium   Date Value Ref Range Status   05/20/2024 139 136 - 145 mmol/L Final     Potassium   Date Value Ref Range Status   05/20/2024 4.1 3.5 - 5.1 mmol/L Final     CO2   Date Value Ref Range Status   05/20/2024 30 (H) 22 - 29 mmol/L Final     Blood Urea Nitrogen   Date Value Ref Range Status   05/20/2024 12.0 8.9 - 20.6 mg/dL Final     Creatinine   Date Value Ref Range Status   05/20/2024 0.67 (L) 0.73 - 1.18 mg/dL Final     Calcium   Date Value Ref Range Status   05/20/2024 9.0 8.4 - 10.2 mg/dL Final     Albumin   Date Value Ref Range Status   05/20/2024 3.6 3.5 - 5.0 g/dL Final     Bilirubin Total   Date Value Ref Range Status  "  05/20/2024 0.3 <=1.5 mg/dL Final     ALP   Date Value Ref Range Status   05/20/2024 111 40 - 150 unit/L Final     AST   Date Value Ref Range Status   05/20/2024 12 5 - 34 unit/L Final     ALT   Date Value Ref Range Status   05/20/2024 20 0 - 55 unit/L Final     Estimated GFR-Non    Date Value Ref Range Status   08/02/2022 >60 mls/min/1.73/m2 Final     Lab Results   Component Value Date    CHOL 119 05/20/2024     Lab Results   Component Value Date    HDL 31 (L) 05/20/2024     No results found for: "LDLCALC"  Lab Results   Component Value Date    TRIG 121 05/20/2024     No results found for: "CHOLHDL"  Lab Results   Component Value Date    TSH 1.443 11/20/2023     Lab Results   Component Value Date    PROTEINUA Trace (A) 07/29/2022    LEUKOCYTESUR Negative 07/29/2022     Lab Results   Component Value Date    HGBA1C 6.0 05/20/2024    HGBA1C 5.9 11/20/2023    HGBA1C 6.0 05/16/2023     No results found for: "MICROALBUR", "[id number]"   Results for orders placed in visit on 11/20/23    Diabetic Eye Screening Photo         Assessment       ICD-10-CM ICD-9-CM   1. Type 2 diabetes mellitus without complication, without long-term current use of insulin  E11.9 250.00   2. On home oxygen therapy  Z99.81 V46.2   3. Obesity hypoventilation syndrome  E66.2 278.03   4. Hepatic steatosis  K76.0 571.8   5. Hematochezia  K92.1 578.1   6. Chronic constipation  K59.09 564.00   7. BMI 50.0-59.9, adult  Z68.43 V85.43   8. Gastroesophageal reflux disease without esophagitis  K21.9 530.81   9. Blurred vision  H53.8 368.8   10. History of wheezing  Z87.898 V12.69   11. SOB (shortness of breath)  R06.02 786.05       Plan       Problem List Items Addressed This Visit          Ophtho    Blurred vision    Relevant Orders    Ambulatory referral/consult to Ophthalmology       Pulmonary    History of wheezing    Relevant Medications    albuterol (PROVENTIL HFA) 90 mcg/actuation inhaler    Other Relevant Orders    Pulmonary function " "test    Obesity hypoventilation syndrome    Overview     On home oxygen         On home oxygen therapy    SOB (shortness of breath)    Current Assessment & Plan     Endorses shortness a breath with wheezing while working around smoke.  Otherwise he is asymptomatic.  We will order PFT and Rx albuterol to be used as needed         Relevant Medications    albuterol (PROVENTIL HFA) 90 mcg/actuation inhaler    Other Relevant Orders    Pulmonary function test       Endocrine    BMI 50.0-59.9, adult    Current Assessment & Plan     BMI 62  Educated on increased risk of disease s/t obesity.  Educated on health benefits of at least 5 days/ week of 30 minutes moderate intensity exercise (brisk walking) and 2 or more days/ week of muscle strength activities (as tolerated).  Eat well balanced diet of fresh fruits/ vegetables, whole grains, lean meats and limit high carbohydrate foods.            Type 2 diabetes mellitus without complication, without long-term current use of insulin - Primary    Current Assessment & Plan     A1c 6%  CBGs: not checking   Hypoglycemia episodes: denies symptoms  Microalbumin: UTD 5/20/24  Educated on ADA diet: eliminate/decrease high carbohydrate foods (rice, pasta, bread, potatoes (french fries, chips), candy, sweets, fruit juices, canned fruit, junk food, crackers, carbonated beverages)  Educated on health benefits of at least 5 days/ week of 30 minutes moderate intensity exercise (brisk walking) and 2 or more days/ week of muscle strength activities  Avoid alcohol or tobacco use  Eye exam: 11/20/23 no DR  Foot exam: 11/20/23  Continue Victoza as prescribed   Encouraged CBG checks          Relevant Medications    liraglutide 0.6 mg/0.1 mL, 18 mg/3 mL, subq PNIJ (VICTOZA 2-LOIS) 0.6 mg/0.1 mL (18 mg/3 mL) PnIj pen    TECHLITE PEN NEEDLE 32 gauge x 5/32" Ndle       GI    Chronic constipation    Current Assessment & Plan     Educated on slowly increasing fiber in diet to include fresh fruits and " vegetables (squash, cabbage, lettuce, other greens, beans, and peas), whole grains and oats, nuts, and importance of adequate water intake (6-8 glasses of fluids daily).  Educated on health benefits of at least 5 days/ week of 30 minutes moderate intensity exercise (brisk walking) and 2 or more days/ week of muscle strength activities.  Daily Miralax with 6-8 oz water/juice           Relevant Orders    Ambulatory referral/consult to Gastroenterology    Gastroesophageal reflux disease without esophagitis    Relevant Medications    omeprazole (PRILOSEC) 40 MG capsule    Hematochezia    Current Assessment & Plan     Patient completed colonoscopy in September of last year but continues with bloody stools associated with hard stools.  He denies any treatment for constipation.  We will send referral back to GI for further discussion and evaluation as previously mentioned possible hemorrhoid banding.  Rx MiraLax to be used as directed             Relevant Orders    Ambulatory referral/consult to Gastroenterology    Hepatic steatosis    Overview     US liver 12/2023  FINDINGS:  Liver measures 20.2 cm with increased echogenicity.  No focal hepatic mass or intrahepatic ductal dilatation.  Normal directional flow is in the main portal vein.     Pancreas is obscured by bowel gas.  IVC is patent.     Gallbladder is decompressed with shadowing suggestive of multiple stones.  No sonographic Alicia sign.  Common bile duct measures 0.5 cm.     Right kidney measures 11.2 cm. No stones or hydronephrosis.  Renal echogenicity is normal.     Impression:     1. Hepatic enlargement with steatosis.  2. Probable gallstones.            Current Assessment & Plan     Low fat/ chol diet. Limit Tylenol 2 g/d. Avoid alcohol. Regular exercise as tolerated. Control of co-morbidities such as HTN, HLD, DM if applicable. US yearly for surveillance.  Referral to GI         Relevant Orders    Ambulatory referral/consult to Gastroenterology       Future  Appointments   Date Time Provider Department Center   6/4/2024  8:00 AM [facility], [facility]   11/20/2024 12:20 PM Sindi Vaughan NP [facility]        Follow up in about 6 months (around 11/20/2024).    Signature:  Sindi Vaughan NP  [facility]  [address] [facility] [address]    Date of encounter: 5/20/24

## 2024-05-20 NOTE — ASSESSMENT & PLAN NOTE
A1c 6%  CBGs: not checking   Hypoglycemia episodes: denies symptoms  Microalbumin: UTD 5/20/24  Educated on ADA diet: eliminate/decrease high carbohydrate foods (rice, pasta, bread, potatoes (french fries, chips), candy, sweets, fruit juices, canned fruit, junk food, crackers, carbonated beverages)  Educated on health benefits of at least 5 days/ week of 30 minutes moderate intensity exercise (brisk walking) and 2 or more days/ week of muscle strength activities  Avoid alcohol or tobacco use  Eye exam: 11/20/23 no DR  Foot exam: 11/20/23  Continue Victoza as prescribed   Encouraged CBG checks

## 2024-05-20 NOTE — ASSESSMENT & PLAN NOTE
Endorses shortness a breath with wheezing while working around smoke.  Otherwise he is asymptomatic.  We will order PFT and Rx albuterol to be used as needed

## 2024-05-20 NOTE — ASSESSMENT & PLAN NOTE
BMI 62  Educated on increased risk of disease s/t obesity.  Educated on health benefits of at least 5 days/ week of 30 minutes moderate intensity exercise (brisk walking) and 2 or more days/ week of muscle strength activities (as tolerated).  Eat well balanced diet of fresh fruits/ vegetables, whole grains, lean meats and limit high carbohydrate foods.

## 2024-05-20 NOTE — ASSESSMENT & PLAN NOTE
Educated on slowly increasing fiber in diet to include fresh fruits and vegetables (squash, cabbage, lettuce, other greens, beans, and peas), whole grains and oats, nuts, and importance of adequate water intake (6-8 glasses of fluids daily).  Educated on health benefits of at least 5 days/ week of 30 minutes moderate intensity exercise (brisk walking) and 2 or more days/ week of muscle strength activities.  Daily Miralax with 6-8 oz water/juice

## 2024-05-20 NOTE — ASSESSMENT & PLAN NOTE
Low fat/ chol diet. Limit Tylenol 2 g/d. Avoid alcohol. Regular exercise as tolerated. Control of co-morbidities such as HTN, HLD, DM if applicable. US yearly for surveillance.  Referral to GI

## 2024-05-20 NOTE — ASSESSMENT & PLAN NOTE
Patient completed colonoscopy in September of last year but continues with bloody stools associated with hard stools.  He denies any treatment for constipation.  We will send referral back to GI for further discussion and evaluation as previously mentioned possible hemorrhoid banding.  Rx MiraLax to be used as directed

## 2024-05-28 ENCOUNTER — HOSPITAL ENCOUNTER (EMERGENCY)
Facility: HOSPITAL | Age: 34
Discharge: HOME | End: 2024-05-28
Attending: INTERNAL MEDICINE

## 2024-05-28 VITALS
BODY MASS INDEX: 55.81 KG/M2 | RESPIRATION RATE: 18 BRPM | OXYGEN SATURATION: 97 % | DIASTOLIC BLOOD PRESSURE: 71 MMHG | TEMPERATURE: 98 F | WEIGHT: 315 LBS | HEART RATE: 82 BPM | SYSTOLIC BLOOD PRESSURE: 111 MMHG | HEIGHT: 63 IN

## 2024-05-28 DIAGNOSIS — H10.33 ACUTE CONJUNCTIVITIS OF BOTH EYES, UNSPECIFIED ACUTE CONJUNCTIVITIS TYPE: Primary | ICD-10-CM

## 2024-05-28 PROCEDURE — 99283 EMERGENCY DEPT VISIT LOW MDM: CPT

## 2024-05-28 RX ORDER — POLYMYXIN B SULFATE AND TRIMETHOPRIM 1; 10000 MG/ML; [USP'U]/ML
1 SOLUTION OPHTHALMIC EVERY 4 HOURS
Qty: 10 ML | Refills: 0 | Status: SHIPPED | OUTPATIENT
Start: 2024-05-28 | End: 2024-06-04

## 2024-05-29 NOTE — ED PROVIDER NOTES
Encounter Date: 5/28/2024       History     Chief Complaint   Patient presents with    Eye Drainage     States bilateral eye pain, redness and drainage starting Sunday.       The patient presents with red eye.  The onset was 2 days ago.  The course/duration of symptoms is constant.  Type of injury: none.  Location: Right eye. The character of symptoms is redness and crusty drainage.  The degree of symptoms is minimal.  The exacerbating factor is opening eyes.  The relieving factor is closing eyes.  Prior episodes: occasional.  Risk factors consist of none.  Therapy today: none.  Associated symptoms: no change in vision.  Additional history: does not wear glasses or contacts.  Video  used for encounter.            Review of patient's allergies indicates:  No Known Allergies  Past Medical History:   Diagnosis Date    Chest pain     Mediastinal lymphadenopathy 11/16/2022    Pulmonary nodules 11/02/2022    Type 2 diabetes mellitus without complications      Past Surgical History:   Procedure Laterality Date    COLONOSCOPY N/A 9/28/2023    Procedure: COLONOSCOPY;  Surgeon: David Medley MD;  Location: [facility] ENDOSCOPY;  Service: Endoscopy;  Laterality: N/A;     Family History   Problem Relation Name Age of Onset    Diabetes Mellitus Mother      No Known Problems Father       Social History     Tobacco Use    Smoking status: Never    Smokeless tobacco: Never   Substance Use Topics    Alcohol use: Not Currently     Comment: quit drinking 5 years ago    Drug use: Never     Review of Systems   Constitutional:  Negative for fever.   HENT:  Negative for sore throat.    Eyes:  Positive for pain, discharge and redness.   Respiratory:  Negative for shortness of breath.    Cardiovascular:  Negative for chest pain.   Gastrointestinal:  Negative for nausea.   Genitourinary:  Negative for dysuria.   Musculoskeletal:  Negative for back pain.   Skin:  Negative for rash.   Neurological:  Negative for weakness.    Hematological:  Does not bruise/bleed easily.   All other systems reviewed and are negative.      Physical Exam     Initial Vitals [05/28/24 1907]   BP Pulse Resp Temp SpO2   111/71 82 18 98.2 °F (36.8 °C) 97 %      MAP       --         Physical Exam    Nursing note and vitals reviewed.  Constitutional: He appears well-developed and well-nourished.   HENT:   Head: Normocephalic and atraumatic.   Right Ear: Tympanic membrane normal.   Left Ear: Tympanic membrane normal.   Nose: Nose normal.   Mouth/Throat: Uvula is midline, oropharynx is clear and moist and mucous membranes are normal.   Eyes: EOM and lids are normal. Pupils are equal, round, and reactive to light. Right conjunctiva is injected. Left conjunctiva is injected.   Mild bilateral injection to conjunctivae with crusty drainage consistent with conjunctivitis   Neck: Neck supple.   Normal range of motion.  Cardiovascular:  Normal rate, regular rhythm, normal heart sounds and intact distal pulses.           Pulmonary/Chest: Effort normal and breath sounds normal. He has no decreased breath sounds.   Abdominal: Abdomen is soft and flat. Bowel sounds are normal. There is no abdominal tenderness.   Musculoskeletal:         General: Normal range of motion.      Cervical back: Normal range of motion and neck supple.     Neurological: He is alert and oriented to person, place, and time. He has normal strength.   Skin: Skin is warm and dry.   Psychiatric: He has a normal mood and affect.         ED Course   Procedures  Labs Reviewed - No data to display       Imaging Results    None          Medications - No data to display  Medical Decision Making  The patient presents with red eye.  The onset was 2 days ago.  The course/duration of symptoms is constant.  Type of injury: none.  Location: Right eye. The character of symptoms is redness and crusty drainage.  The degree of symptoms is minimal.  The exacerbating factor is opening eyes.  The relieving factor is  closing eyes.  Prior episodes: occasional.  Risk factors consist of none.  Therapy today: none.  Associated symptoms: no change in vision.  Additional history: does not wear glasses or contacts.  Video  used for encounter.    He will f/u with ophthalmologist in 3-4 days if no improvement. Return to ER precautions given.      Additional MDM:   Differential Diagnosis:   Conjunctivitis, corneal abrasion, uveitis, scleritis, periorbital cellulitis, orbital cellulitis among others                   ED Course as of 05/28/24 1942   Tue May 28, 2024   1941 Given strict ED return precautions. I have spoken with the patient and/or caregivers. I have explained the patient's condition, diagnoses and treatment plan based on the information available to me at this time. I have answered the patient's and/or caregiver's questions and addressed any concerns. The patient and/or caregivers have as good an understanding of the patient's diagnosis, condition and treatment plan as can be expected at this point. The vital signs have been stable. The patient's condition is stable and appropriate for discharge from the emergency department.      The patient will pursue further outpatient evaluation with the primary care physician or other designated or consulting physician as outlined in the discharge instructions. The patient and/or caregivers are agreeable to this plan of care and follow-up instructions have been explained in detail. The patient and/or caregivers have received these instructions in written format and have expressed an understanding of the discharge instructions. The patient and/or caregivers are aware that any significant change in condition or worsening of symptoms should prompt an immediate return to this or the closest emergency department or a call to 911.      [RB]      ED Course User Index  [RB] Eren Klein, MAGAN                           Clinical Impression:  Final diagnoses:  [H10.33] Acute  conjunctivitis of both eyes, unspecified acute conjunctivitis type (Primary)          ED Disposition Condition    Discharge Stable          ED Prescriptions       Medication Sig Dispense Start Date End Date Auth. Provider    polymyxin B sulf-trimethoprim (POLYTRIM) 10,000 unit- 1 mg/mL Drop Place 1 drop into both eyes every 4 (four) hours. While awake, maximum 6 doses per day for 7 days 10 mL 5/28/2024 6/4/2024 Eren Klein ACNP          Follow-up Information       Follow up With Specialties Details Why Contact Info    follow up with ophthalmologist of choice if no improvement in 3-4 days        [facility] Emergency Medicine  If symptoms worsen [address] [facility] [address] [phone]    follow up with your primary care provider in 3-5 days                 Eren Klein ACNP  05/28/24 1942

## 2024-06-17 ENCOUNTER — HOSPITAL ENCOUNTER (OUTPATIENT)
Dept: PULMONOLOGY | Facility: HOSPITAL | Age: 34
Discharge: HOME | End: 2024-06-17
Attending: NURSE PRACTITIONER

## 2024-06-17 VITALS — RESPIRATION RATE: 20 BRPM | HEART RATE: 86 BPM | OXYGEN SATURATION: 89 %

## 2024-06-17 DIAGNOSIS — Z87.898 HISTORY OF WHEEZING: ICD-10-CM

## 2024-06-17 DIAGNOSIS — R06.02 SOB (SHORTNESS OF BREATH): ICD-10-CM

## 2024-06-17 LAB
DLCO SINGLE BREATH LLN: 20.22
DLCO SINGLE BREATH PRE REF: 88.5 %
DLCO SINGLE BREATH REF: 27.14
DLCOC SBVA LLN: 3.3
DLCOC SBVA REF: 4.93
DLCOC SINGLE BREATH LLN: 20.22
DLCOC SINGLE BREATH REF: 27.14
DLCOVA LLN: 3.3
DLCOVA PRE REF: 109.5 %
DLCOVA PRE: 5.4 ML/(MIN*MMHG*L) (ref 3.3–6.56)
DLCOVA REF: 4.93
ERV LLN: -16448.71
ERV PRE REF: 35.5 %
ERV REF: 1.29
FEF 25 75 CHG: -12.9 %
FEF 25 75 LLN: 2.09
FEF 25 75 POST REF: 39.1 %
FEF 25 75 PRE REF: 44.8 %
FEF 25 75 REF: 3.5
FET100 CHG: -37.4 %
FEV1 CHG: -10.5 %
FEV1 FVC CHG: -8.6 %
FEV1 FVC LLN: 72
FEV1 FVC POST REF: 68.2 %
FEV1 FVC PRE REF: 74.6 %
FEV1 FVC REF: 83
FEV1 LLN: 2.63
FEV1 POST REF: 52.9 %
FEV1 PRE REF: 59.2 %
FEV1 REF: 3.31
FRCPLETH LLN: 1.93
FRCPLETH PREREF: 35.4 %
FRCPLETH REF: 2.92
FVC CHG: -2.2 %
FVC LLN: 3.18
FVC POST REF: 77.6 %
FVC PRE REF: 79.3 %
FVC REF: 3.98
IVC PRE: 2.98 L (ref 3.18–4.78)
IVC SINGLE BREATH LLN: 3.18
IVC SINGLE BREATH PRE REF: 75 %
IVC SINGLE BREATH REF: 3.98
MVV LLN: 110
MVV PRE REF: 55.5 %
MVV REF: 130
PEF CHG: -15.4 %
PEF LLN: 6.32
PEF POST REF: 22.5 %
PEF PRE REF: 26.6 %
PEF REF: 8.41
POST FEF 25 75: 1.37 L/S (ref 2.09–5.26)
POST FET 100: 4.06 SEC
POST FEV1 FVC: 56.74 % (ref 72.4–92.16)
POST FEV1: 1.75 L (ref 2.63–3.97)
POST FVC: 3.09 L (ref 3.18–4.78)
POST PEF: 1.89 L/S (ref 6.32–10.49)
PRE DLCO: 24.02 ML/(MIN*MMHG) (ref 20.22–34.07)
PRE ERV: 0.46 L (ref -16448.71–16451.29)
PRE FEF 25 75: 1.57 L/S (ref 2.09–5.26)
PRE FET 100: 6.48 SEC
PRE FEV1 FVC: 62.05 % (ref 72.4–92.16)
PRE FEV1: 1.96 L (ref 2.63–3.97)
PRE FRC PL: 1.03 L (ref 1.93–3.91)
PRE FVC: 3.16 L (ref 3.18–4.78)
PRE MVV: 71.93 L/MIN (ref 110.08–148.93)
PRE PEF: 2.24 L/S (ref 6.32–10.49)
PRE RV: 0.57 L (ref 0.95–2.3)
PRE TLC: 3.73 L (ref 4.35–6.65)
RAW LLN: 3.06
RAW PRE REF: 246.2 %
RAW PRE: 7.53 CMH2O*S/L (ref 3.06–3.06)
RAW REF: 3.06
RV LLN: 0.95
RV PRE REF: 35.4 %
RV REF: 1.62
RVTLC LLN: 19
RVTLC PRE REF: 55 %
RVTLC PRE: 15.41 % (ref 19.02–36.98)
RVTLC REF: 28
TLC LLN: 4.35
TLC PRE REF: 67.8 %
TLC REF: 5.5
VA PRE: 4.45 L (ref 5.35–5.35)
VA SINGLE BREATH LLN: 5.35
VA SINGLE BREATH PRE REF: 83.1 %
VA SINGLE BREATH REF: 5.35
VC LLN: 3.18
VC PRE REF: 79.3 %
VC PRE: 3.16 L (ref 3.18–4.78)
VC REF: 3.98

## 2024-06-17 PROCEDURE — 94640 AIRWAY INHALATION TREATMENT: CPT | Mod: XB

## 2024-06-17 PROCEDURE — 94729 DIFFUSING CAPACITY: CPT

## 2024-06-17 PROCEDURE — 94060 EVALUATION OF WHEEZING: CPT

## 2024-06-17 RX ORDER — ALBUTEROL SULFATE 2.5 MG/.5ML
2.5 SOLUTION RESPIRATORY (INHALATION) EVERY 4 HOURS PRN
Status: DISPENSED | OUTPATIENT
Start: 2024-06-17

## 2024-06-17 RX ORDER — ALBUTEROL SULFATE 0.83 MG/ML
SOLUTION RESPIRATORY (INHALATION)
Status: DISCONTINUED
Start: 2024-06-17 | End: 2024-06-18 | Stop reason: HOSPADM

## 2024-06-17 RX ADMIN — ALBUTEROL SULFATE 2.5 MG: 2.5 SOLUTION RESPIRATORY (INHALATION) at 11:06

## 2024-06-17 NOTE — PROGRESS NOTES
Good cooperation and effort given. Unable to complete FRC after 5 attempts. Albuterol 2.5 mg given HR 86/88, SAT 89%, BBS exp. Wheezing  PFT completed

## 2024-06-20 PROCEDURE — 94060 EVALUATION OF WHEEZING: CPT | Mod: 26,,, | Performed by: INTERNAL MEDICINE

## 2024-06-20 PROCEDURE — 94726 PLETHYSMOGRAPHY LUNG VOLUMES: CPT | Mod: 26,,, | Performed by: INTERNAL MEDICINE

## 2024-06-20 PROCEDURE — 94729 DIFFUSING CAPACITY: CPT | Mod: 26,,, | Performed by: INTERNAL MEDICINE

## 2024-07-16 ENCOUNTER — TELEPHONE (OUTPATIENT)
Dept: INTERNAL MEDICINE | Facility: CLINIC | Age: 34
End: 2024-07-16

## 2024-07-17 ENCOUNTER — TELEPHONE (OUTPATIENT)
Dept: INTERNAL MEDICINE | Facility: CLINIC | Age: 34
End: 2024-07-17

## 2024-11-08 ENCOUNTER — OFFICE VISIT (OUTPATIENT)
Dept: GASTROENTEROLOGY | Facility: CLINIC | Age: 34
End: 2024-11-08

## 2024-11-08 ENCOUNTER — LAB VISIT (OUTPATIENT)
Dept: LAB | Facility: HOSPITAL | Age: 34
End: 2024-11-08
Attending: NURSE PRACTITIONER

## 2024-11-08 VITALS
RESPIRATION RATE: 16 BRPM | BODY MASS INDEX: 57.97 KG/M2 | HEART RATE: 57 BPM | DIASTOLIC BLOOD PRESSURE: 83 MMHG | TEMPERATURE: 99 F | OXYGEN SATURATION: 94 % | SYSTOLIC BLOOD PRESSURE: 128 MMHG | HEIGHT: 62 IN | WEIGHT: 315 LBS

## 2024-11-08 DIAGNOSIS — K59.04 CHRONIC IDIOPATHIC CONSTIPATION: ICD-10-CM

## 2024-11-08 DIAGNOSIS — K76.0 HEPATIC STEATOSIS: ICD-10-CM

## 2024-11-08 DIAGNOSIS — K92.1 HEMATOCHEZIA: ICD-10-CM

## 2024-11-08 PROBLEM — R19.5 OCCULT BLOOD IN STOOLS: Status: RESOLVED | Noted: 2023-03-02 | Resolved: 2024-11-08

## 2024-11-08 LAB
ALBUMIN SERPL-MCNC: 3.7 G/DL (ref 3.5–5)
ALBUMIN/GLOB SERPL: 1 RATIO (ref 1.1–2)
ALP SERPL-CCNC: 105 UNIT/L (ref 40–150)
ALT SERPL-CCNC: 17 UNIT/L (ref 0–55)
ANION GAP SERPL CALC-SCNC: 6 MEQ/L
AST SERPL-CCNC: 17 UNIT/L (ref 5–34)
BASOPHILS # BLD AUTO: 0.02 X10(3)/MCL
BASOPHILS NFR BLD AUTO: 0.3 %
BILIRUB SERPL-MCNC: 0.6 MG/DL
BUN SERPL-MCNC: 9.6 MG/DL (ref 8.9–20.6)
CALCIUM SERPL-MCNC: 8.9 MG/DL (ref 8.4–10.2)
CHLORIDE SERPL-SCNC: 102 MMOL/L (ref 98–107)
CO2 SERPL-SCNC: 30 MMOL/L (ref 22–29)
CREAT SERPL-MCNC: 0.67 MG/DL (ref 0.72–1.25)
CREAT/UREA NIT SERPL: 14
EOSINOPHIL # BLD AUTO: 0.17 X10(3)/MCL (ref 0–0.9)
EOSINOPHIL NFR BLD AUTO: 2.4 %
ERYTHROCYTE [DISTWIDTH] IN BLOOD BY AUTOMATED COUNT: 14.2 % (ref 11.5–17)
GFR SERPLBLD CREATININE-BSD FMLA CKD-EPI: >60 ML/MIN/1.73/M2
GLOBULIN SER-MCNC: 3.6 GM/DL (ref 2.4–3.5)
GLUCOSE SERPL-MCNC: 102 MG/DL (ref 74–100)
HCT VFR BLD AUTO: 43.4 % (ref 42–52)
HGB BLD-MCNC: 14 G/DL (ref 14–18)
IMM GRANULOCYTES # BLD AUTO: 0.02 X10(3)/MCL (ref 0–0.04)
IMM GRANULOCYTES NFR BLD AUTO: 0.3 %
INR PPP: 1
LYMPHOCYTES # BLD AUTO: 1.58 X10(3)/MCL (ref 0.6–4.6)
LYMPHOCYTES NFR BLD AUTO: 21.9 %
MCH RBC QN AUTO: 28.3 PG (ref 27–31)
MCHC RBC AUTO-ENTMCNC: 32.3 G/DL (ref 33–36)
MCV RBC AUTO: 87.7 FL (ref 80–94)
MONOCYTES # BLD AUTO: 0.75 X10(3)/MCL (ref 0.1–1.3)
MONOCYTES NFR BLD AUTO: 10.4 %
NEUTROPHILS # BLD AUTO: 4.69 X10(3)/MCL (ref 2.1–9.2)
NEUTROPHILS NFR BLD AUTO: 64.7 %
NRBC BLD AUTO-RTO: 0 %
PLATELET # BLD AUTO: 223 X10(3)/MCL (ref 130–400)
PMV BLD AUTO: 11.1 FL (ref 7.4–10.4)
POTASSIUM SERPL-SCNC: 4.2 MMOL/L (ref 3.5–5.1)
PROT SERPL-MCNC: 7.3 GM/DL (ref 6.4–8.3)
PROTHROMBIN TIME: 13.6 SECONDS (ref 11.4–14)
RBC # BLD AUTO: 4.95 X10(6)/MCL (ref 4.7–6.1)
SODIUM SERPL-SCNC: 138 MMOL/L (ref 136–145)
WBC # BLD AUTO: 7.23 X10(3)/MCL (ref 4.5–11.5)

## 2024-11-08 PROCEDURE — 80053 COMPREHEN METABOLIC PANEL: CPT

## 2024-11-08 PROCEDURE — 85025 COMPLETE CBC W/AUTO DIFF WBC: CPT

## 2024-11-08 PROCEDURE — 99215 OFFICE O/P EST HI 40 MIN: CPT | Mod: PBBFAC | Performed by: NURSE PRACTITIONER

## 2024-11-08 PROCEDURE — 0003M LIVER DIS 10 ASSAYS W/NASH: CPT

## 2024-11-08 PROCEDURE — 36415 COLL VENOUS BLD VENIPUNCTURE: CPT

## 2024-11-08 PROCEDURE — 85610 PROTHROMBIN TIME: CPT

## 2024-11-08 NOTE — PROGRESS NOTES
Please notify that lab work looks okay.  LFTs within normal limits.  Hemoglobin and hematocrit stable and within normal limits.  Thanks

## 2024-11-08 NOTE — PROGRESS NOTES
Subjective:       Patient ID: Tawanda Izaguirre is a 34 y.o. male.    Chief Complaint: Heptaic Steatosis (No complaints)    This 34-year-old  male presents unaccompanied for a follow-up visit.   Olive [id number] and Kiley [id number] used throughout patient visit.  He was initially referred for rectal bleeding and seen March 2, 2023.  He is referred back to us today for constipation, hematochezia, and hepatic steatosis.     used throughout patient visit.  When initially seen, he reported feeling more constipated since November 2022.  Prior to this time, he reported having 1-2 formed stools daily.  In November 2022, he had 5-6 bowel movements daily secondary to not feeling completely evacuated.  He had occasional bloating and gas.  He reported frequent red blood with bowel movements noted on the tissue after wiping and in the toilet bowl.  He had occasional straining.  He denied taking anything for symptomatic relief of constipation.  His appetite was good and his weight was stable.  He denied nocturnal symptoms, fever, chills, nausea, vomiting, hematemesis, odynophagia, dysphagia, acid reflux, pyrosis, early satiety, abdominal pain, melena, fecal urgency, fecal incontinence, or pain with defecation.     He underwent colonoscopy September 28, 2023 with findings of diverticulosis in the sigmoid colon, exam otherwise normal with no specimens collected and a recommended recall of 10 years.    Abdominal ultrasound December 6, 2023 revealed hepatic enlargement with steatosis, probable gallstones.    Today, he presents for a follow-up visit.  He reports having 2-3 bowel movements daily and not always feeling completely evacuated.  He denies taking anything for symptomatic relief of constipation but is amenable to trying Linzess at this time.  He denies melena, hematochezia, fecal urgency, fecal incontinence, or pain with defecation.  His appetite is good and his weight is stable.  He denies  fever, chills, nausea, vomiting, hematemesis, odynophagia, dysphagia, acid reflux, pyrosis, early satiety, or abdominal pain.  He denies icterus, jaundice, pruritus, confusion, headaches, vision changes, cough, shortness of breath, chest pain, abdominal/lower extremity swelling, dark-colored urine, or pale-colored stools.      FOBT was positive November 27, 2022.  He denies ever having an EGD done.  He denies regular NSAID use or use of blood thinners.  He denies tobacco or alcohol use.  He denies illicit drug use.  He denies a family history of IBD, colon polyps, or colon cancer.    Review of patient's allergies indicates:  No Known Allergies    Past Medical History:   Diagnosis Date    Chest pain     Mediastinal lymphadenopathy 11/16/2022    Pulmonary nodules 11/02/2022    Type 2 diabetes mellitus without complications      Past Surgical History:   Procedure Laterality Date    COLONOSCOPY N/A 9/28/2023    Procedure: COLONOSCOPY;  Surgeon: David Medley MD;  Location: [facility] ENDOSCOPY;  Service: Endoscopy;  Laterality: N/A;     Family History:   family history includes Diabetes Mellitus in his mother; No Known Problems in his father.    Social History:    reports that he has never smoked. He has never used smokeless tobacco. He reports that he does not currently use alcohol. He reports that he does not use drugs.    Review of Systems  Negative except as noted in the HPI.      Objective:      Physical Exam  Constitutional:       Appearance: Normal appearance.   HENT:      Head: Normocephalic.      Mouth/Throat:      Mouth: Mucous membranes are moist.   Eyes:      Extraocular Movements: Extraocular movements intact.      Conjunctiva/sclera: Conjunctivae normal.      Pupils: Pupils are equal, round, and reactive to light.   Cardiovascular:      Rate and Rhythm: Normal rate and regular rhythm.      Pulses: Normal pulses.      Heart sounds: Normal heart sounds.   Pulmonary:      Effort: Pulmonary effort is normal.  "     Breath sounds: Normal breath sounds.   Abdominal:      General: Bowel sounds are normal.      Palpations: Abdomen is soft.   Musculoskeletal:         General: Normal range of motion.      Cervical back: Normal range of motion and neck supple.   Skin:     General: Skin is warm and dry.   Neurological:      General: No focal deficit present.      Mental Status: He is alert and oriented to person, place, and time.   Psychiatric:         Mood and Affect: Mood normal.         Behavior: Behavior normal.         Thought Content: Thought content normal.         Judgment: Judgment normal.         Home Medications:     Current Outpatient Medications   Medication Sig    albuterol (PROVENTIL HFA) 90 mcg/actuation inhaler Inhale 2 puffs into the lungs every 6 (six) hours as needed for Wheezing. Rescue    liraglutide 0.6 mg/0.1 mL, 18 mg/3 mL, subq PNIJ (VICTOZA 2-LOIS) 0.6 mg/0.1 mL (18 mg/3 mL) PnIj pen Inject 0.6 mg into the skin once daily.    omeprazole (PRILOSEC) 40 MG capsule Take 1 capsule (40 mg total) by mouth once daily.    TECHLITE PEN NEEDLE 32 gauge x 5/32" Ndle Use once daily for Victoza injection    blood sugar diagnostic Strp To check BG once daily, to use with insurance preferred meter (Patient not taking: Reported on 11/8/2024)    blood-glucose meter kit To check BG once daily, to use with insurance preferred meter (Patient not taking: Reported on 11/8/2024)    lancets Misc To check BG once daily, to use with insurance preferred meter (Patient not taking: Reported on 11/8/2024)    linaCLOtide (LINZESS) 145 mcg Cap capsule Take 1 capsule (145 mcg total) by mouth before breakfast.    terbinafine HCL (LAMISIL) 250 mg tablet Take 1 tablet (250 mg total) by mouth once daily. (Patient not taking: Reported on 11/8/2024)     Current Facility-Administered Medications   Medication Frequency    albuterol sulfate nebulizer solution 2.5 mg Q4H PRN     Laboratory Results:     Recent Results (from the past 30 weeks) "   Comprehensive Metabolic Panel    Collection Time: 05/20/24  8:37 AM   Result Value Ref Range    Sodium 139 136 - 145 mmol/L    Potassium 4.1 3.5 - 5.1 mmol/L    Chloride 102 98 - 107 mmol/L    CO2 30 (H) 22 - 29 mmol/L    Glucose 104 (H) 74 - 100 mg/dL    Blood Urea Nitrogen 12.0 8.9 - 20.6 mg/dL    Creatinine 0.67 (L) 0.73 - 1.18 mg/dL    Calcium 9.0 8.4 - 10.2 mg/dL    Protein Total 7.1 6.4 - 8.3 gm/dL    Albumin 3.6 3.5 - 5.0 g/dL    Globulin 3.5 2.4 - 3.5 gm/dL    Albumin/Globulin Ratio 1.0 (L) 1.1 - 2.0 ratio    Bilirubin Total 0.3 <=1.5 mg/dL     40 - 150 unit/L    ALT 20 0 - 55 unit/L    AST 12 5 - 34 unit/L    eGFR >60 mL/min/1.73/m2    Anion Gap 7.0 mEq/L    BUN/Creatinine Ratio 18    Hemoglobin A1C    Collection Time: 05/20/24  8:37 AM   Result Value Ref Range    Hemoglobin A1c 6.0 <=7.0 %    Estimated Average Glucose 125.5 mg/dL   Lipid Panel    Collection Time: 05/20/24  8:37 AM   Result Value Ref Range    Cholesterol Total 119 <=200 mg/dL    HDL Cholesterol 31 (L) 35 - 60 mg/dL    Triglyceride 121 34 - 140 mg/dL    Cholesterol/HDL Ratio 4 0 - 5    Very Low Density Lipoprotein 24     LDL Cholesterol 64.00 50.00 - 140.00 mg/dL   CBC with Differential    Collection Time: 05/20/24  8:37 AM   Result Value Ref Range    WBC 6.15 4.50 - 11.50 x10(3)/mcL    RBC 5.04 4.70 - 6.10 x10(6)/mcL    Hgb 14.1 14.0 - 18.0 g/dL    Hct 43.9 42.0 - 52.0 %    MCV 87.1 80.0 - 94.0 fL    MCH 28.0 27.0 - 31.0 pg    MCHC 32.1 (L) 33.0 - 36.0 g/dL    RDW 14.0 11.5 - 17.0 %    Platelet 208 130 - 400 x10(3)/mcL    MPV 10.8 (H) 7.4 - 10.4 fL    Neut % 59.0 %    Lymph % 26.7 %    Mono % 10.2 %    Eos % 3.4 %    Basophil % 0.2 %    Lymph # 1.64 0.6 - 4.6 x10(3)/mcL    Neut # 3.63 2.1 - 9.2 x10(3)/mcL    Mono # 0.63 0.1 - 1.3 x10(3)/mcL    Eos # 0.21 0 - 0.9 x10(3)/mcL    Baso # 0.01 <=0.2 x10(3)/mcL    IG# 0.03 0 - 0.04 x10(3)/mcL    IG% 0.5 %    NRBC% 0.0 %   Microalbumin/Creatinine Ratio, Urine    Collection Time: 05/20/24   8:43 AM   Result Value Ref Range    Urine Microalbumin <5.0 <=30.0 ug/mL    Urine Creatinine 69.4 63.0 - 166.0 mg/dL    Microalbumin Creatinine Ratio     Pulmonary function test    Collection Time: 06/20/24  9:22 AM   Result Value Ref Range    Post FVC 3.09 (L) 3.18 - 4.78 L    Post FEV1 1.75 (L) 2.63 - 3.97 L    Post FEV1 FVC 56.74 (L) 72.40 - 92.16 %    Post FEF 25 75 1.37 (L) 2.09 - 5.26 L/s    Post PEF 1.89 (L) 6.32 - 10.49 L/s    Post  4.06 sec    Pre DLCO 24.02 20.22 - 34.07 ml/(min*mmHg)    DLCOVA PRE 5.40 3.30 - 6.56 ml/(min*mmHg*L)    VA PRE 4.45 (L) 5.35 - 5.35 L    IVC PRE 2.98 (L) 3.18 - 4.78 L    Pre TLC 3.73 (L) 4.35 - 6.65 L    VC PRE 3.16 (L) 3.18 - 4.78 L    Pre FRC PL 1.03 (L) 1.93 - 3.91 L    Pre ERV 0.46 -92077.71 - 11065.29 L    Pre RV 0.57 (L) 0.95 - 2.30 L    RVTLC PRE 15.41 (L) 19.02 - 36.98 %    Raw PRE 7.53 (H) 3.06 - 3.06 cmH2O*s/L    Pre FVC 3.16 (L) 3.18 - 4.78 L    Pre FEV1 1.96 (L) 2.63 - 3.97 L    Pre FEV1 FVC 62.05 (L) 72.40 - 92.16 %    Pre FEF 25 75 1.57 (L) 2.09 - 5.26 L/s    Pre PEF 2.24 (L) 6.32 - 10.49 L/s    Pre  6.48 sec    Pre MVV 71.93 (L) 110.08 - 148.93 L/min    FVC Ref 3.98     FVC LLN 3.18     FVC Pre Ref 79.3 %    FVC Post Ref 77.6 %    FVC Chg -2.2 %    FEV1 Ref 3.31     FEV1 LLN 2.63     FEV1 Pre Ref 59.2 %    FEV1 Post Ref 52.9 %    FEV1 Chg -10.5 %    FEV1 FVC Ref 83     FEV1 FVC LLN 72     FEV1 FVC Pre Ref 74.6 %    FEV1 FVC Post Ref 68.2 %    FEV1 FVC Chg -8.6 %    FEF 25 75 Ref 3.50     FEF 25 75 LLN 2.09     FEF 25 75 Pre Ref 44.8 %    FEF 25 75 Post Ref 39.1 %    FEF 25 75 Chg -12.9 %    PEF Ref 8.41     PEF LLN 6.32     PEF Pre Ref 26.6 %    PEF Post Ref 22.5 %    PEF Chg -15.4 %    [id number] Chg -37.4 %    MVV Ref 130     MVV      MVV Pre Ref 55.5 %    TLC Ref 5.50     TLC LLN 4.35     TLC Pre Ref 67.8 %    VC Ref 3.98     VC LLN 3.18     VC Pre Ref 79.3 %    FRCpleth Ref 2.92     FRCpleth LLN 1.93     FRCpleth PreRef 35.4 %    ERV Ref 1.29      ERV LLN -08553.71     ERV Pre Ref 35.5 %    RV Ref 1.62     RV LLN 0.95     RV Pre Ref 35.4 %    RVTLC Ref 28     RVTLC LLN 19     RVTLC Pre Ref 55.0 %    Raw Ref 3.06     Raw LLN 3.06     Raw Pre Ref 246.2 %    DLCO Single Breath Ref 27.14     DLCO Single Breath LLN 20.22     DLCO Single Breath Pre Ref 88.5 %    DLCOc Single Breath Ref 27.14     DLCOc Single Breath LLN 20.22     DLCOVA Ref 4.93     DLCOVA LLN 3.30     DLCOVA Pre Ref 109.5 %    DLCOc SBVA Ref 4.93     DLCOc SBVA LLN 3.30     VA Single Breath Ref 5.35     VA Single Breath LLN 5.35     VA Single Breath Pre Ref 83.1 %    IVC Single Breath Ref 3.98     IVC Single Breath LLN 3.18     IVC Single Breath Pre Ref 75.0 %     Imaging Results:     Narrative & Impression  EXAMINATION:  US ABDOMEN LIMITED_LIVER     CLINICAL HISTORY:  hepatic steatosis; Fatty (change of) liver, not elsewhere classified     COMPARISON:  None     FINDINGS:  Liver measures 20.2 cm with increased echogenicity.  No focal hepatic mass or intrahepatic ductal dilatation.  Normal directional flow is in the main portal vein.     Pancreas is obscured by bowel gas.  IVC is patent.     Gallbladder is decompressed with shadowing suggestive of multiple stones.  No sonographic Alicia sign.  Common bile duct measures 0.5 cm.     Right kidney measures 11.2 cm. No stones or hydronephrosis.  Renal echogenicity is normal.     Impression:     1. Hepatic enlargement with steatosis.  2. Probable gallstones.      Electronically signed by:Carlos Enrique Cabrera MD  Date:                                            12/06/2023  Time:                                           17:03    Assessment/Plan:     Problem List Items Addressed This Visit          GI    Hepatic steatosis     Abdominal ultrasound December 6, 2023 revealed hepatic enlargement with steatosis, probable gallstones.  LFTs normal May 20, 2024  Lifestyle and dietary modifications provided  Recommend good control of comorbidities  Recommend weight  loss   CBC, CMP, PT/INR, FibroSURE   Abdominal ultrasound  Call with updates   Follow-up clinic visit with NP in 6 months         Relevant Orders    CBC Auto Differential    Comprehensive Metabolic Panel    Protime-INR    US Abdomen Limited    ALLEN Fibrosure    Hematochezia     Resolved  He underwent colonoscopy September 28, 2023 with findings of diverticulosis in the sigmoid colon, exam otherwise normal with no specimens collected and a recommended recall of 10 years.         Chronic idiopathic constipation     He underwent colonoscopy September 28, 2023 with findings of diverticulosis in the sigmoid colon, exam otherwise normal with no specimens collected and a recommended recall of 10 years.  Recommend soluble fiber supplementation  Avoid straining or sitting on the toilet for long periods of time  Start Linzess 145 mcg daily         Relevant Medications    linaCLOtide (LINZESS) 145 mcg Cap capsule

## 2024-11-08 NOTE — ASSESSMENT & PLAN NOTE
He underwent colonoscopy September 28, 2023 with findings of diverticulosis in the sigmoid colon, exam otherwise normal with no specimens collected and a recommended recall of 10 years.  Recommend soluble fiber supplementation  Avoid straining or sitting on the toilet for long periods of time  Start Linzess 145 mcg daily

## 2024-11-08 NOTE — ASSESSMENT & PLAN NOTE
Resolved  He underwent colonoscopy September 28, 2023 with findings of diverticulosis in the sigmoid colon, exam otherwise normal with no specimens collected and a recommended recall of 10 years.

## 2024-11-08 NOTE — ASSESSMENT & PLAN NOTE
Abdominal ultrasound December 6, 2023 revealed hepatic enlargement with steatosis, probable gallstones.  LFTs normal May 20, 2024  Lifestyle and dietary modifications provided  Recommend good control of comorbidities  Recommend weight loss   CBC, CMP, PT/INR, FibroSURE   Abdominal ultrasound  Call with updates   Follow-up clinic visit with NP in 6 months

## 2024-11-11 ENCOUNTER — TELEPHONE (OUTPATIENT)
Dept: GASTROENTEROLOGY | Facility: CLINIC | Age: 34
End: 2024-11-11

## 2024-11-11 LAB
A2 MACROGLOB SERPL-MCNC: 203 MG/DL (ref 100–280)
ALT SERPL W P-5'-P-CCNC: 21 U/L (ref 7–55)
ANNOTATION COMMENT IMP: ABNORMAL
APO A-I SERPL-MCNC: 109 MG/DL
AST SERPL W P-5'-P-CCNC: 20 U/L (ref 8–48)
BILIRUB SERPL-MCNC: 0.5 MG/DL (ref 0–1.2)
CHOLEST SERPL-MCNC: 136 MG/DL
FIBROSIS STAGE SERPL QL: ABNORMAL
GGT SERPL-CCNC: 12 U/L (ref 8–61)
GLUCOSE P FAST SERPL-MCNC: 103 MG/DL (ref 70–100)
HAPTOGLOB SERPL NEPH-MCNC: 174 MG/DL (ref 30–200)
LIVER FIBR SCORE SERPL CALC.FIBROSURE: 0.15
LIVER FIBROSIS INTERPRETATION SER-IMP: ABNORMAL
LIVER STEATOSIS GRADE SERPL QL: ABNORMAL
LIVER STEATOSIS INTERP SERPL-IMP: ABNORMAL
LIVER STEATOSIS SCORE SERPL: 0.27
NASH GRADE SERPL QL: ABNORMAL
NASH INTERPRETATION SERPL-IMP: ABNORMAL
NASH SCORE SERPL: 0
SERIAL #: ABNORMAL
TRIGL SERPL-MCNC: 140 MG/DL

## 2024-11-11 NOTE — TELEPHONE ENCOUNTER
----- Message from EDDA León sent at 11/8/2024 10:48 AM CST -----  Please notify that lab work looks okay.  LFTs within normal limits.  Hemoglobin and hematocrit stable and within normal limits.  Thanks

## 2024-11-15 ENCOUNTER — TELEPHONE (OUTPATIENT)
Dept: GASTROENTEROLOGY | Facility: CLINIC | Age: 34
End: 2024-11-15

## 2024-11-15 NOTE — TELEPHONE ENCOUNTER
----- Message from EDDA León sent at 11/12/2024  7:56 AM CST -----  Please notify FibroSure with no ALLEN, no steatosis, no fibrosis.  Thanks

## 2024-11-20 ENCOUNTER — OFFICE VISIT (OUTPATIENT)
Dept: INTERNAL MEDICINE | Facility: CLINIC | Age: 34
End: 2024-11-20

## 2024-11-20 VITALS
WEIGHT: 315 LBS | HEIGHT: 62 IN | OXYGEN SATURATION: 95 % | SYSTOLIC BLOOD PRESSURE: 111 MMHG | HEART RATE: 79 BPM | BODY MASS INDEX: 57.97 KG/M2 | RESPIRATION RATE: 20 BRPM | DIASTOLIC BLOOD PRESSURE: 77 MMHG | TEMPERATURE: 98 F

## 2024-11-20 DIAGNOSIS — E66.2 CLASS 3 OBESITY WITH ALVEOLAR HYPOVENTILATION, SERIOUS COMORBIDITY, AND BODY MASS INDEX (BMI) OF 60.0 TO 69.9 IN ADULT: ICD-10-CM

## 2024-11-20 DIAGNOSIS — E11.9 TYPE 2 DIABETES MELLITUS WITHOUT COMPLICATION, WITHOUT LONG-TERM CURRENT USE OF INSULIN: Primary | ICD-10-CM

## 2024-11-20 DIAGNOSIS — E66.813 CLASS 3 OBESITY WITH ALVEOLAR HYPOVENTILATION, SERIOUS COMORBIDITY, AND BODY MASS INDEX (BMI) OF 60.0 TO 69.9 IN ADULT: ICD-10-CM

## 2024-11-20 DIAGNOSIS — B35.3 TINEA PEDIS OF BOTH FEET: ICD-10-CM

## 2024-11-20 DIAGNOSIS — K21.9 GASTROESOPHAGEAL REFLUX DISEASE WITHOUT ESOPHAGITIS: ICD-10-CM

## 2024-11-20 DIAGNOSIS — J42 CHRONIC BRONCHITIS, UNSPECIFIED CHRONIC BRONCHITIS TYPE: ICD-10-CM

## 2024-11-20 PROBLEM — B35.1 ONYCHOMYCOSIS: Status: RESOLVED | Noted: 2022-11-16 | Resolved: 2024-11-20

## 2024-11-20 PROBLEM — E66.01 CLASS 3 SEVERE OBESITY WITH SERIOUS COMORBIDITY AND BODY MASS INDEX (BMI) OF 60.0 TO 69.9 IN ADULT: Status: ACTIVE | Noted: 2024-11-20

## 2024-11-20 PROBLEM — E66.01 CLASS 3 SEVERE OBESITY WITH SERIOUS COMORBIDITY AND BODY MASS INDEX (BMI) OF 60.0 TO 69.9 IN ADULT: Status: ACTIVE | Noted: 2023-05-16

## 2024-11-20 LAB — HBA1C MFR BLD: 6.2 %

## 2024-11-20 PROCEDURE — 99214 OFFICE O/P EST MOD 30 MIN: CPT | Mod: S$PBB,,, | Performed by: NURSE PRACTITIONER

## 2024-11-20 PROCEDURE — 83036 HEMOGLOBIN GLYCOSYLATED A1C: CPT | Mod: PBBFAC | Performed by: NURSE PRACTITIONER

## 2024-11-20 PROCEDURE — 99214 OFFICE O/P EST MOD 30 MIN: CPT | Mod: PBBFAC | Performed by: NURSE PRACTITIONER

## 2024-11-20 RX ORDER — LIRAGLUTIDE 6 MG/ML
0.6 INJECTION SUBCUTANEOUS DAILY
Qty: 3 ML | Refills: 6 | Status: SHIPPED | OUTPATIENT
Start: 2024-11-20 | End: 2025-11-20

## 2024-11-20 RX ORDER — FLUTICASONE PROPIONATE AND SALMETEROL 100; 50 UG/1; UG/1
1 POWDER RESPIRATORY (INHALATION) 2 TIMES DAILY
Qty: 60 EACH | Refills: 5 | Status: SHIPPED | OUTPATIENT
Start: 2024-11-20 | End: 2025-11-20

## 2024-11-20 RX ORDER — OMEPRAZOLE 40 MG/1
40 CAPSULE, DELAYED RELEASE ORAL DAILY
Qty: 90 CAPSULE | Refills: 2 | Status: SHIPPED | OUTPATIENT
Start: 2024-11-20 | End: 2025-11-20

## 2024-11-20 RX ORDER — PRENATAL VIT 91/IRON/FOLIC/DHA 28-975-200
COMBINATION PACKAGE (EA) ORAL NIGHTLY
Qty: 28.4 G | Refills: 5 | Status: SHIPPED | OUTPATIENT
Start: 2024-11-20

## 2024-11-20 NOTE — PATIENT INSTRUCTIONS
[facility] eye clinic [phone]   Patient stated he is done with his medication for sciatic nerve pain but is still having issues, please advise.

## 2024-11-20 NOTE — ASSESSMENT & PLAN NOTE
Lab Results   Component Value Date    HGBA1C 6.0 05/20/2024     CBGs: not checking   Hypoglycemia episodes: denies symptoms  Microalbumin:  Lab Results   Component Value Date    MICALBCREAT  05/20/2024      Comment:      Unable to calculate     Educated on ADA diet: eliminate/decrease high carbohydrate foods (rice, pasta, bread, potatoes (french fries, chips), candy, sweets, fruit juices, canned fruit, junk food, crackers, carbonated beverages)  Educated on health benefits of at least 5 days/ week of 30 minutes moderate intensity exercise (brisk walking) and 2 or more days/ week of muscle strength activities  Avoid alcohol or tobacco use  Eye exam: 11/20/23 no  Encouraged to f/u with [facility] eye for r/s appt  Foot exam: 11/20/24  Continue Victoza as prescribed   Encouraged CBG checks

## 2024-11-20 NOTE — ASSESSMENT & PLAN NOTE
BMI 64.04  Educated on increased risk of disease s/t obesity.  Educated on health benefits of at least 5 days/ week of 30 minutes moderate intensity exercise (brisk walking) and 2 or more days/ week of muscle strength activities (as tolerated).  Eat well balanced diet of fresh fruits/ vegetables, whole grains, lean meats and limit high carbohydrate foods.

## 2024-11-20 NOTE — PROGRESS NOTES
Sindi L Alexus, NP   [facility]  [address] [facility] [address]      PATIENT NAME: Tawanda Izaguirre  : 1990  DATE: 24  MRN: [id number]        History of Present Illness / Problem Focused Workflow     Tawanda Izaguirre presents to the clinic with Diabetes (States not checking glucose. Denies any chest pain .)     35 yo male for routine 6 mo follow up. Last seen 24. PMH includes atypical CP (stress test negative for ischemia), pulmonary nodules and mediastinal lymphadenopathy (resolved). Active diagnosis includes DM, obesity hypoventilation syndrome, on home oxygen, hepatic steatosis, GERD, hemorrhoids/ rectal bleeding. Denies any concerns. Request refill of Terbinafine because he has itching to feet. POC A1c 6.2%. Not checking CBGs. Using home oxygen at night. 24 PFT with moderate obstruction without bronchodilator response. No other concerns.   Encouraged him to reschedule [facility] Ophthalmology clinic appointment.    Other providers:   [facility] Cardiology- 3/7/23- f/u prn   [facility] GI - colonoscopy completed 2023  [facility] Ophthalmology- missed visit 10/17/24    Review of Systems     Review of Systems   Constitutional: Negative.    HENT: Negative.     Eyes: Negative.    Respiratory: Negative.     Cardiovascular: Negative.    Gastrointestinal: Negative.    Endocrine: Negative.    Genitourinary: Negative.    Musculoskeletal: Negative.    Skin: Negative.    Allergic/Immunologic: Negative.    Neurological: Negative.    Hematological: Negative.    Psychiatric/Behavioral: Negative.         Medical / Social / Family History     -------------------------------------    Chest pain    Mediastinal lymphadenopathy    Onychomycosis    Pulmonary nodules    Type 2 diabetes mellitus without complications        Past Surgical History:   Procedure Laterality Date    COLONOSCOPY N/A 2023    Procedure: COLONOSCOPY;  Surgeon: David Medley  MD AVELINA;  Location: [facility] ENDOSCOPY;  Service: Endoscopy;  Laterality: N/A;       Social History     Socioeconomic History    Marital status: Single   Tobacco Use    Smoking status: Never    Smokeless tobacco: Never   Substance and Sexual Activity    Alcohol use: Not Currently     Comment: quit drinking 5 years ago    Drug use: Never     Social Drivers of Health     Financial Resource Strain: High Risk (11/20/2024)    Overall Financial Resource Strain (CARDIA)     Difficulty of Paying Living Expenses: Very hard   Food Insecurity: No Food Insecurity (11/20/2024)    Hunger Vital Sign     Worried About Running Out of Food in the Last Year: Never true     Ran Out of Food in the Last Year: Never true   Transportation Needs: No Transportation Needs (11/20/2024)    TRANSPORTATION NEEDS     Transportation : No   Physical Activity: Inactive (11/2/2022)    Exercise Vital Sign     Days of Exercise per Week: 0 days     Minutes of Exercise per Session: 0 min   Housing Stability: Low Risk  (11/20/2024)    Housing Stability Vital Sign     Unable to Pay for Housing in the Last Year: No     Homeless in the Last Year: No        Family History   Problem Relation Name Age of Onset    Diabetes Mellitus Mother      No Known Problems Father          Medications and Allergies     Medications  Current Outpatient Medications   Medication Instructions    albuterol (PROVENTIL HFA) 90 mcg/actuation inhaler 2 puffs, Inhalation, Every 6 hours PRN, Rescue    blood sugar diagnostic Strp To check BG once daily, to use with insurance preferred meter    blood-glucose meter kit To check BG once daily, to use with insurance preferred meter    fluticasone-salmeterol diskus inhaler 100-50 mcg 1 puff, Inhalation, 2 times daily, Controller    lancets Misc To check BG once daily, to use with insurance preferred meter    linaCLOtide (LINZESS) 145 mcg, Oral, Before breakfast    liraglutide 0.6 mg/0.1 mL (18 mg/3 mL) subq PNIJ (VICTOZA 2-LOIS) 0.6 mg,  "Subcutaneous, Daily    omeprazole (PRILOSEC) 40 mg, Oral, Daily    TECHLITE PEN NEEDLE 32 gauge x 5/32" Ndle Use once daily for Victoza injection    terbinafine HCL (LAMISIL) 1 % cream Topical (Top), Nightly       Allergies  Review of patient's allergies indicates:  No Known Allergies    Physical Examination     Visit Vitals  /77 (BP Location: Right arm, Patient Position: Sitting)   Pulse 79   Temp 98.3 °F (36.8 °C) (Oral)   Resp 20   Ht 5' 2.01" (1.575 m)   Wt (!) 158.8 kg (350 lb 3.2 oz)   SpO2 95%   BMI 64.04 kg/m²       Physical Exam  Constitutional:       General: He is not in acute distress.     Appearance: Normal appearance. He is obese. He is not ill-appearing, toxic-appearing or diaphoretic.   HENT:      Head: Normocephalic.   Cardiovascular:      Rate and Rhythm: Normal rate and regular rhythm.      Pulses:           Dorsalis pedis pulses are 2+ on the right side and 2+ on the left side.        Posterior tibial pulses are 2+ on the right side and 2+ on the left side.   Pulmonary:      Effort: Pulmonary effort is normal. No respiratory distress.      Breath sounds: Normal breath sounds. No stridor. No wheezing, rhonchi or rales.   Feet:      Right foot:      Protective Sensation: 5 sites tested.  5 sites sensed.      Skin integrity: Skin integrity normal.      Toenail Condition: Right toenails are normal.      Left foot:      Protective Sensation: 5 sites tested.  5 sites sensed.      Skin integrity: Skin integrity normal.      Toenail Condition: Left toenails are normal.   Skin:     General: Skin is warm and dry.   Neurological:      General: No focal deficit present.      Mental Status: He is alert and oriented to person, place, and time. Mental status is at baseline.      Motor: No weakness.      Coordination: Coordination normal.      Gait: Gait normal.   Psychiatric:         Mood and Affect: Mood normal.         Behavior: Behavior normal.         Thought Content: Thought content normal.         " Judgment: Judgment normal.           Results     Lab Results   Component Value Date    WBC 7.23 11/08/2024    RBC 4.95 11/08/2024    HGB 14.0 11/08/2024    HCT 43.4 11/08/2024    MCV 87.7 11/08/2024    MCH 28.3 11/08/2024    MCHC 32.3 (L) 11/08/2024    RDW 14.2 11/08/2024     11/08/2024    MPV 11.1 (H) 11/08/2024     Sodium   Date Value Ref Range Status   11/08/2024 138 136 - 145 mmol/L Final     Potassium   Date Value Ref Range Status   11/08/2024 4.2 3.5 - 5.1 mmol/L Final     Chloride   Date Value Ref Range Status   11/08/2024 102 98 - 107 mmol/L Final     CO2   Date Value Ref Range Status   11/08/2024 30 (H) 22 - 29 mmol/L Final     Blood Urea Nitrogen   Date Value Ref Range Status   11/08/2024 9.6 8.9 - 20.6 mg/dL Final     Creatinine   Date Value Ref Range Status   11/08/2024 0.67 (L) 0.72 - 1.25 mg/dL Final     Calcium   Date Value Ref Range Status   11/08/2024 8.9 8.4 - 10.2 mg/dL Final     Albumin   Date Value Ref Range Status   11/08/2024 3.7 3.5 - 5.0 g/dL Final     Bilirubin Total   Date Value Ref Range Status   11/08/2024 0.6 <=1.5 mg/dL Final     ALP   Date Value Ref Range Status   11/08/2024 105 40 - 150 unit/L Final     AST   Date Value Ref Range Status   11/08/2024 17 5 - 34 unit/L Final     ALT   Date Value Ref Range Status   11/08/2024 17 0 - 55 unit/L Final     Estimated GFR-Non    Date Value Ref Range Status   08/02/2022 >60 mls/min/1.73/m2 Final     Lab Results   Component Value Date    CHOL 119 05/20/2024     Lab Results   Component Value Date    HDL 31 (L) 05/20/2024     Lab Results   Component Value Date    TRIG 121 05/20/2024     Lab Results   Component Value Date    LDL 64.00 05/20/2024     Lab Results   Component Value Date    TSH 1.443 11/20/2023     Lab Results   Component Value Date    PHUR 6.0 07/29/2022    PROTEINUA Trace (A) 07/29/2022    GLUCUA Normal 07/29/2022    OCCULTUA Negative 07/29/2022    NITRITE Negative 07/29/2022    LEUKOCYTESUR Negative 07/29/2022      Lab Results   Component Value Date    HGBA1C 6.0 05/20/2024    HGBA1C 5.9 11/20/2023    HGBA1C 6.0 05/16/2023     Lab Results   Component Value Date    MICALBCREAT  05/20/2024      Comment:      Unable to calculate        Assessment       ICD-10-CM ICD-9-CM   1. Type 2 diabetes mellitus without complication, without long-term current use of insulin  E11.9 250.00   2. Chronic bronchitis, unspecified chronic bronchitis type  J42 491.9   3. Gastroesophageal reflux disease without esophagitis  K21.9 530.81   4. Tinea pedis of both feet  B35.3 110.4   5. Class 3 obesity with alveolar hypoventilation, serious comorbidity, and body mass index (BMI) of 60.0 to 69.9 in adult  E66.813 278.03    E66.2 V85.44    Z68.44        Plan       Problem List Items Addressed This Visit          Derm    Tinea pedis of both feet    Current Assessment & Plan     Keep feet clean and dry. Avoid excessive moisture/ sweating. Rx Lamisil cream.          Relevant Medications    terbinafine HCL (LAMISIL) 1 % cream       Pulmonary    Chronic bronchitis    Relevant Medications    fluticasone-salmeterol diskus inhaler 100-50 mcg       Endocrine    Class 3 severe obesity with serious comorbidity and body mass index (BMI) of 60.0 to 69.9 in adult    Current Assessment & Plan     BMI 64.04  Educated on increased risk of disease s/t obesity.  Educated on health benefits of at least 5 days/ week of 30 minutes moderate intensity exercise (brisk walking) and 2 or more days/ week of muscle strength activities (as tolerated).  Eat well balanced diet of fresh fruits/ vegetables, whole grains, lean meats and limit high carbohydrate foods.            Relevant Orders    Comprehensive Metabolic Panel    Hemoglobin A1C    TSH    Vitamin D    Microalbumin/Creatinine Ratio, Urine    Type 2 diabetes mellitus without complication, without long-term current use of insulin - Primary    Current Assessment & Plan     Lab Results   Component Value Date    HGBA1C 6.0  05/20/2024     CBGs: not checking   Hypoglycemia episodes: denies symptoms  Microalbumin:  Lab Results   Component Value Date    MICALBCREAT  05/20/2024      Comment:      Unable to calculate     Educated on ADA diet: eliminate/decrease high carbohydrate foods (rice, pasta, bread, potatoes (french fries, chips), candy, sweets, fruit juices, canned fruit, junk food, crackers, carbonated beverages)  Educated on health benefits of at least 5 days/ week of 30 minutes moderate intensity exercise (brisk walking) and 2 or more days/ week of muscle strength activities  Avoid alcohol or tobacco use  Eye exam: 11/20/23 no  Encouraged to f/u with [facility] eye for r/s appt  Foot exam: 11/20/24  Continue Victoza as prescribed   Encouraged CBG checks          Relevant Medications    liraglutide 0.6 mg/0.1 mL, 18 mg/3 mL, subq PNIJ (VICTOZA 2-LOIS) 0.6 mg/0.1 mL (18 mg/3 mL) PnIj pen    Other Relevant Orders    POCT HEMOGLOBIN A1C (Completed)    Comprehensive Metabolic Panel    Hemoglobin A1C    TSH    Vitamin D    Microalbumin/Creatinine Ratio, Urine       GI    Gastroesophageal reflux disease without esophagitis    Relevant Medications    omeprazole (PRILOSEC) 40 MG capsule       Future Appointments   Date Time Provider Department Center   5/15/2025  9:30 AM Whit Friedman FNP [facility]   5/20/2025  9:00 AM Sindi Vaughan NP [facility]        Follow up in about 6 months (around 5/20/2025) for COPD, diabetes with fasting labs.    Signature:  Sindi Vaughan NP  [facility]  [address] [facility] [address]    Date of encounter: 11/20/24

## 2025-02-05 NOTE — DISCHARGE SUMMARY
"[facility] [address] [place] [address] Medicine  Discharge Summary      Patient Name: Tawanda Izaguirre  MRN: [id number]  Patient Class: OP- Observation  Admission Date: 7/29/2022  Hospital Length of Stay: 5 days  Discharge Date and Time:  08/02/2022 2:51 PM  Attending Physician: Juan Miller MD   Discharging Provider: Lauri Tan MD  Primary Care Provider: Primary Doctor No    HPI:   "32-year-old  male presented to [facility] ED with complaints of suprapubic rash that started about 4 days ago. Patient states that he initially noticed a pimple in the suprapubic region 4 days ago and believes that he popped them when he was wearing a belt while at work. He works in construction. Patient states that there is nothing that makes it better or anything that makes it worse. Patient has not tried anything to alleviate his symptoms. Patient also reports shortness of breath that he notices more on exertion. Patient denies fever, chills, lightheadedness, dizziness, chest pain, PND, abdominal pain, nausea, vomiting, dysuria, hematuria, hematochezia." per chart.    Hospital Course:   Mr. Torres is a 33 yo M who presented to [facility] ED with no PMH c/o suprapubic abdominal pain and rash that has been spreading up his abdomen for 4 days. He denied any associated symptoms including fever/chills, n/v, recent trauma to the area. He did complain of some dysuria at the time. In the ED he was found to have profound hypoxia with SpO2 sats in the 50s-60s on room air. BiPAP improved SpO2 above 90 which he worse while sleeping throughout his hospital course. ABG showed chronic CO2 retention. He was admitted for cellulitis and improvements noted with regimen of vancomycin, levofloxacin, miconazole 2% powder and oral fluconazole. He was discharge with home O2, PO clindamycin 150 mg, PO fluconazole 150 mg, miconazole 2% powder and Victoza. Please return to Emergency Department if symptoms worsen.      Physical " Exam:  Vitals:    08/02/22 1117   BP: 113/64   Pulse: 74   Resp: 16   Temp: 98.7 °F (37.1 °C)     General: alert and oriented, no acute distress  HEENT: normocephalic, atraumatic   Respiratory: rhonchi present bilaterally. Breathing somewhat labored 2/2 habitus. No wheezing, crackles, rales.   Cardiovascular: RRR, no murmurs, rubs, or gallops. No peripheral edema. No JVD.  Gastrointestinal: soft, non-tender, non-distended. Normal bowel sounds.  Musculoskeletal: moves all extremities purposefully  Integumentary: suprapubic region mildly TTP, erythematous, indurated. Multiple comedones over affected area. No fluctuance, drainage.  Neurologic: CNs II-XII are grossly intact.   Cognition and Speech: oriented, speech clear and coherent.     Goals of Care Treatment Preferences:  Code Status: Full Code    Consults:   Consults (From admission, onward)        Status Ordering Provider     Inpatient consult to Social Work/Case Management  Once        Provider:  (Not yet assigned)    Completed RICAHRD BROWN     Inpatient consult to Internal Medicine  Once        Provider:  (Not yet assigned)    Acknowledged LEV BRADFORD          Final Active Diagnoses:    Diagnosis Date Noted POA    Cellulitis of suprapubic region [L03.319]  Yes    SOB (shortness of breath) [R06.02]  Yes    Obesity hypoventilation syndrome [E66.2]  Yes    Acute hypercapnic respiratory failure [J96.02]  Yes      Problems Resolved During this Admission:       Discharged Condition: stable    Disposition: Discharge on PO clindamycin, PO fluconazole, miconazole 2% powder, home O2 and Victoza.    Follow Up: [facility] post-wards clinic on August 24, 2022 at 1:00 pm.    Patient Instructions:      OXYGEN FOR HOME USE     Order Specific Question Answer Comments   Liter Flow 2    Duration With activity    Qualifying Test Performed at: Activity    Oxygen saturation at rest 97    Oxygen saturation with activity 82    Oxygen saturation with activity on oxygen  93    Portable mode: continuous    Route nasal cannula    Device: home concentrator with portable tanks    Length of need (in months): 99 mos    Patient condition with qualifying saturation Other - List qualifying diagnosis and code    Select a diagnosis & list the code in the comments Obesity with alveolar hypoventilation and body mass index (BMI) of 40 or greater [5446767]    Height: 5' (1.524 m)    Weight: 181.4 kg (400 lb)    Alternative treatment measures have been tried or considered and deemed clinically ineffective. Yes        Significant Diagnostic Studies: Labs:   BMP:   Recent Labs   Lab 08/01/22 0442 08/02/22 0417    140   K 4.4 4.1   CO2 33* 38*   BUN 9.6 7.7*   CREATININE 0.66* 0.72*   CALCIUM 8.7 8.9   MG 1.50*  --    , CMP   Recent Labs   Lab 08/01/22 0442 08/02/22 0417    140   K 4.4 4.1   CO2 33* 38*   BUN 9.6 7.7*   CREATININE 0.66* 0.72*   CALCIUM 8.7 8.9   ALBUMIN 3.0* 3.1*   BILITOT 0.7 0.8   ALKPHOS 89 93   AST 16 23   ALT 32 29   EGFRNONAA >60 >60   , CBC   Recent Labs   Lab 08/01/22 0442 08/02/22 0417   WBC 7.5 6.0   HGB 15.5 15.6   HCT 53.9* 53.5*    196   , Lipid Panel   Lab Results   Component Value Date    CHOL 83 07/30/2022    HDL 15 (L) 07/30/2022    TRIG 103 07/30/2022    and A1C:   Recent Labs   Lab 07/30/22  0153   HGBA1C 6.6     Radiology: X-Ray: CXR: X-Ray Chest 1 View (CXR):   Results for orders placed or performed during the hospital encounter of 07/29/22   X-Ray Chest 1 View    Narrative    EXAMINATION:  XR CHEST 1 VIEW    CPT 38042    CLINICAL HISTORY:  shortness of breath;    FINDINGS:  Mediastinal silhouette to be within normal limits cardiac silhouette is enlarged there is some increase in interstitial and pulmonary vascular markings which may indicate a degree of pulmonary vascular congestion and cardiac decompensation.    No definite focal consolidative changes      Impression    Mild cardiomegaly.    Increase in interstitial and pulmonary  No vascular markings indicating a degree of pulmonary vascular congestion      Electronically signed by: Jorge Holliday  Date:    07/30/2022  Time:    08:10     CT scan: CT ABDOMEN PELVIS WITH CONTRAST:   Results for orders placed or performed during the hospital encounter of 07/29/22   CT Abdomen Pelvis With Contrast    Narrative    EXAMINATION:  CT ABDOMEN PELVIS WITH CONTRAST    CLINICAL HISTORY:  cellulitis, lower abdomen, genital area;    TECHNIQUE:  CT imaging of the abdomen and pelvis after the administration of intravenous contrast. Dose length product is 1436 mGycm. Automatic exposure control, adjustment of mA/kV or iterative reconstruction technique used to limit radiation dose.    COMPARISON:  No relevant comparison studies available at the time of dictation.    FINDINGS:  Liver/biliary: Liver mildly enlarged.  No biliary dilatation.    Pancreas: Normal.    Spleen: Normal.    Adrenals: Normal.    Genitourinary: Symmetric renal enhancement. No hydronephrosis. Bladder under distended with no definitive abnormality.    Stomach/bowel: No evidence of bowel obstruction. Normal appendix. No definitive sites of bowel inflammation.    Lymph nodes: Borderline enlarged bilateral inguinal and external iliac lymph nodes.    Peritoneum: Trace ascites.    Vasculature: Normal abdominal aortic caliber.  Patent SMV, splenic vein and main portal vein.    Abdominal wall: Mild to moderate subcutaneous edema along the flanks.  Areas of mild skin thickening in the lower anterior abdominal wall.    Lung bases: No consolidation or pleural effusion.    Musculoskeletal: No acute osseous findings.      Impression    1. Subcutaneous edema along the flanks with suspected cellulitis of the lower anterior abdominal wall.  2. Borderline enlarged inguinal and external iliac lymph nodes, likely reactive change.  3. Mild hepatomegaly with trace ascites.  No major discrepancy with the preliminary radiology report.      Electronically signed  by: Layo Sexton  Date:    07/30/2022  Time:    07:45        Cardiac Graphics: Echocardiogram:   Transthoracic echo (TTE) complete (Cupid Only):   Results for orders placed or performed during the hospital encounter of 07/29/22   Echo   Result Value Ref Range    BSA 2.77 m2    LV LATERAL E/E' RATIO 8.55 m/s    Right Atrial Pressure (from IVC) 8 mmHg    EF 55 %    TDI LATERAL 0.11 m/s    LVIDd 5.39 3.5 - 6.0 cm    IVS 1.18 (A) 0.6 - 1.1 cm    Posterior Wall 0.99 0.6 - 1.1 cm    LVIDs 3.93 2.1 - 4.0 cm    FS 27 28 - 44 %    LV mass 229.81 g    LA size 4.30 cm    RVDD 3.80 cm    Left Ventricle Relative Wall Thickness 0.37 cm    MV mean gradient 2 mmHg    MV valve area p 1/2 method 3.04 cm2    E/A ratio 1.57     E wave deceleration time [id number] msec    LVOT diameter 2.23 cm    LVOT area 3.9 cm2    MV peak gradient 6 mmHg    TV rest pulmonary artery pressure 28 mmHg    MV Peak E Uli 0.94 m/s    TR Max Uli 2.21 m/s    MV VTI 24.3 cm    MV stenosis pressure 1/2 time [id number] ms    MV Peak A Uli 0.60 m/s    LV Systolic Volume 66.93 mL    LV Systolic Volume Index 26.7 mL/m2    LV Diastolic Volume 140.81 mL    LV Diastolic Volume Index 56.10 mL/m2    LV Mass Index 92 g/m2    Triscuspid Valve Regurgitation Peak Gradient 20 mmHg    Narrative    · The estimated ejection fraction is 55%.  · Normal systolic function.  · Mild right ventricular enlargement with normal right ventricular   systolic function.  · Intermediate central venous pressure (8 mmHg).  · The estimated PA systolic pressure is 28 mmHg.  · IVC is dilated and collapses >50% with inspiration.  · There is no pulmonary hypertension.          Pending Diagnostic Studies:     Procedure Component Value Units Date/Time    EXTRA TUBES [[id number]] Collected: 07/29/22 2313    Order Status: Sent Lab Status: In process Updated: 07/29/22 2313    Specimen: Blood, Venous     Narrative:      The following orders were created for panel order EXTRA  TUBES.  Procedure                               Abnormality         Status                     ---------                               -----------         ------                     Light Green Top Hold[[id number]]                             In process                   Please view results for these tests on the individual orders.    EXTRA TUBES [[id number]] Collected: 07/29/22 2048    Order Status: Sent Lab Status: In process Updated: 07/29/22 2048    Specimen: Blood, Venous     Narrative:      The following orders were created for panel order EXTRA TUBES.  Procedure                               Abnormality         Status                     ---------                               -----------         ------                     Light Blue Top Hold[[id number]]                              In process                 Red Top Hold[[id number]]                                     In process                   Please view results for these tests on the individual orders.         Medications:  Reconciled Home Medications:      Medication List      START taking these medications    clindamycin 150 MG capsule  Commonly known as: CLEOCIN  Take 3 capsules (450 mg total) by mouth every 8 (eight) hours. for 6 days     fluconazole 150 MG Tab  Commonly known as: DIFLUCAN  Take 1 tablet (150 mg total) by mouth every 7 days. for 21 days  Start taking on: August 9, 2022     miconazole NITRATE 2 % 2 % top powder  Commonly known as: MICOTIN  Apply topically 2 (two) times daily.          Time spent on the discharge of patient: 31 minutes    Lauri Tan MD  Department of Hospital Medicine  [facility] - [address] [place] [address] Telemetry

## 2025-05-15 ENCOUNTER — RESULTS FOLLOW-UP (OUTPATIENT)
Dept: GASTROENTEROLOGY | Facility: CLINIC | Age: 35
End: 2025-05-15

## 2025-05-15 ENCOUNTER — OFFICE VISIT (OUTPATIENT)
Dept: GASTROENTEROLOGY | Facility: CLINIC | Age: 35
End: 2025-05-15

## 2025-05-15 ENCOUNTER — LAB VISIT (OUTPATIENT)
Dept: LAB | Facility: HOSPITAL | Age: 35
End: 2025-05-15
Attending: NURSE PRACTITIONER

## 2025-05-15 VITALS
BODY MASS INDEX: 57.97 KG/M2 | HEART RATE: 68 BPM | WEIGHT: 315 LBS | TEMPERATURE: 98 F | RESPIRATION RATE: 18 BRPM | OXYGEN SATURATION: 100 % | DIASTOLIC BLOOD PRESSURE: 80 MMHG | HEIGHT: 62 IN | SYSTOLIC BLOOD PRESSURE: 118 MMHG

## 2025-05-15 DIAGNOSIS — K92.1 HEMATOCHEZIA: ICD-10-CM

## 2025-05-15 DIAGNOSIS — K76.0 HEPATIC STEATOSIS: ICD-10-CM

## 2025-05-15 DIAGNOSIS — K76.0 HEPATIC STEATOSIS: Primary | ICD-10-CM

## 2025-05-15 DIAGNOSIS — E66.813 CLASS 3 OBESITY WITH ALVEOLAR HYPOVENTILATION, SERIOUS COMORBIDITY, AND BODY MASS INDEX (BMI) OF 60.0 TO 69.9 IN ADULT: ICD-10-CM

## 2025-05-15 DIAGNOSIS — E66.2 CLASS 3 OBESITY WITH ALVEOLAR HYPOVENTILATION, SERIOUS COMORBIDITY, AND BODY MASS INDEX (BMI) OF 60.0 TO 69.9 IN ADULT: ICD-10-CM

## 2025-05-15 DIAGNOSIS — K59.04 CHRONIC IDIOPATHIC CONSTIPATION: ICD-10-CM

## 2025-05-15 DIAGNOSIS — D64.9 NORMOCYTIC ANEMIA: ICD-10-CM

## 2025-05-15 DIAGNOSIS — D64.9 NORMOCYTIC ANEMIA: Primary | ICD-10-CM

## 2025-05-15 DIAGNOSIS — E11.9 TYPE 2 DIABETES MELLITUS WITHOUT COMPLICATION, WITHOUT LONG-TERM CURRENT USE OF INSULIN: ICD-10-CM

## 2025-05-15 LAB
25(OH)D3+25(OH)D2 SERPL-MCNC: 18 NG/ML (ref 30–80)
ALBUMIN SERPL-MCNC: 3.6 G/DL (ref 3.5–5)
ALBUMIN/GLOB SERPL: 0.9 RATIO (ref 1.1–2)
ALP SERPL-CCNC: 123 UNIT/L (ref 40–150)
ALT SERPL-CCNC: 27 UNIT/L (ref 0–55)
ANION GAP SERPL CALC-SCNC: 7 MEQ/L
AST SERPL-CCNC: 17 UNIT/L (ref 11–45)
BASOPHILS # BLD AUTO: 0.01 X10(3)/MCL
BASOPHILS NFR BLD AUTO: 0.1 %
BILIRUB SERPL-MCNC: 0.7 MG/DL
BUN SERPL-MCNC: 9.5 MG/DL (ref 8.9–20.6)
CALCIUM SERPL-MCNC: 9 MG/DL (ref 8.4–10.2)
CHLORIDE SERPL-SCNC: 99 MMOL/L (ref 98–107)
CO2 SERPL-SCNC: 31 MMOL/L (ref 22–29)
CREAT SERPL-MCNC: 0.68 MG/DL (ref 0.72–1.25)
CREAT UR-MCNC: 104.7 MG/DL (ref 63–166)
CREAT/UREA NIT SERPL: 14
EOSINOPHIL # BLD AUTO: 0.11 X10(3)/MCL (ref 0–0.9)
EOSINOPHIL NFR BLD AUTO: 1.6 %
ERYTHROCYTE [DISTWIDTH] IN BLOOD BY AUTOMATED COUNT: 15.4 % (ref 11.5–17)
EST. AVERAGE GLUCOSE BLD GHB EST-MCNC: 134.1 MG/DL
GFR SERPLBLD CREATININE-BSD FMLA CKD-EPI: >60 ML/MIN/1.73/M2
GLOBULIN SER-MCNC: 4 GM/DL (ref 2.4–3.5)
GLUCOSE SERPL-MCNC: 95 MG/DL (ref 74–100)
HBA1C MFR BLD: 6.3 %
HCT VFR BLD AUTO: 43.7 % (ref 42–52)
HGB BLD-MCNC: 13.6 G/DL (ref 14–18)
IMM GRANULOCYTES # BLD AUTO: 0.01 X10(3)/MCL (ref 0–0.04)
IMM GRANULOCYTES NFR BLD AUTO: 0.1 %
INR PPP: 1
LYMPHOCYTES # BLD AUTO: 1.59 X10(3)/MCL (ref 0.6–4.6)
LYMPHOCYTES NFR BLD AUTO: 23.3 %
MCH RBC QN AUTO: 26.5 PG (ref 27–31)
MCHC RBC AUTO-ENTMCNC: 31.1 G/DL (ref 33–36)
MCV RBC AUTO: 85 FL (ref 80–94)
MICROALBUMIN UR-MCNC: <5 UG/ML
MICROALBUMIN/CREAT RATIO PNL UR: NORMAL
MONOCYTES # BLD AUTO: 0.87 X10(3)/MCL (ref 0.1–1.3)
MONOCYTES NFR BLD AUTO: 12.8 %
NEUTROPHILS # BLD AUTO: 4.22 X10(3)/MCL (ref 2.1–9.2)
NEUTROPHILS NFR BLD AUTO: 62.1 %
NRBC BLD AUTO-RTO: 0.3 %
PLATELET # BLD AUTO: 245 X10(3)/MCL (ref 130–400)
PMV BLD AUTO: 10.9 FL (ref 7.4–10.4)
POTASSIUM SERPL-SCNC: 4.3 MMOL/L (ref 3.5–5.1)
PROT SERPL-MCNC: 7.6 GM/DL (ref 6.4–8.3)
PROTHROMBIN TIME: 13.6 SECONDS (ref 11.4–14)
RBC # BLD AUTO: 5.14 X10(6)/MCL (ref 4.7–6.1)
SODIUM SERPL-SCNC: 137 MMOL/L (ref 136–145)
TSH SERPL-ACNC: 1.98 UIU/ML (ref 0.35–4.94)
WBC # BLD AUTO: 6.81 X10(3)/MCL (ref 4.5–11.5)

## 2025-05-15 PROCEDURE — 84443 ASSAY THYROID STIM HORMONE: CPT

## 2025-05-15 PROCEDURE — 0003M LIVER DIS 10 ASSAYS W/NASH: CPT

## 2025-05-15 PROCEDURE — 82306 VITAMIN D 25 HYDROXY: CPT

## 2025-05-15 PROCEDURE — 85610 PROTHROMBIN TIME: CPT

## 2025-05-15 PROCEDURE — 36415 COLL VENOUS BLD VENIPUNCTURE: CPT

## 2025-05-15 PROCEDURE — 99214 OFFICE O/P EST MOD 30 MIN: CPT | Mod: S$PBB,,, | Performed by: NURSE PRACTITIONER

## 2025-05-15 PROCEDURE — 85025 COMPLETE CBC W/AUTO DIFF WBC: CPT

## 2025-05-15 PROCEDURE — 83540 ASSAY OF IRON: CPT

## 2025-05-15 PROCEDURE — 82728 ASSAY OF FERRITIN: CPT

## 2025-05-15 PROCEDURE — 99215 OFFICE O/P EST HI 40 MIN: CPT | Mod: PBBFAC | Performed by: NURSE PRACTITIONER

## 2025-05-15 PROCEDURE — 83036 HEMOGLOBIN GLYCOSYLATED A1C: CPT

## 2025-05-15 PROCEDURE — 80053 COMPREHEN METABOLIC PANEL: CPT

## 2025-05-15 PROCEDURE — 82043 UR ALBUMIN QUANTITATIVE: CPT

## 2025-05-15 NOTE — PROGRESS NOTES
Subjective:       Patient ID: Tawanda Izaguirre is a 35 y.o. male.    Chief Complaint: Follow-up    This 35-year-old  male presents unaccompanied for a follow-up visit.  He was initially referred for rectal bleeding and seen March 2, 2023.  He was referred back to us for constipation, hematochezia, and hepatic steatosis and seen November 8, 2024.   Emily [id number] used throughout patient visit.  When initially seen, he reported feeling more constipated since November 2022.  Prior to this time, he reported having 1-2 formed stools daily.  In November 2022, he had 5-6 bowel movements daily secondary to not feeling completely evacuated.  He had occasional bloating and gas.  He reported frequent red blood with bowel movements noted on the tissue after wiping and in the toilet bowl.  He had occasional straining.  He denied taking anything for symptomatic relief of constipation.  His appetite was good and his weight was stable.  He denied nocturnal symptoms, fever, chills, nausea, vomiting, hematemesis, odynophagia, dysphagia, acid reflux, pyrosis, early satiety, abdominal pain, melena, fecal urgency, fecal incontinence, or pain with defecation.      He underwent colonoscopy September 28, 2023 with findings of diverticulosis in the sigmoid colon, exam otherwise normal with no specimens collected and a recommended recall of 10 years.     Abdominal ultrasound December 6, 2023 revealed hepatic enlargement with steatosis, probable gallstones.     He was seen for a follow-up visit November 8, 2024.  He reported having 2-3 bowel movements daily and did not always feeling completely evacuated.  He denied taking anything for symptomatic relief of constipation but was amenable to trying Linzess at that time.  He denied melena, hematochezia, fecal urgency, fecal incontinence, or pain with defecation.  His appetite was good and his weight was stable.  He denied fever, chills, nausea, vomiting, hematemesis,  odynophagia, dysphagia, acid reflux, pyrosis, early satiety, or abdominal pain.  He denied icterus, jaundice, pruritus, confusion, headaches, vision changes, cough, shortness of breath, chest pain, abdominal/lower extremity swelling, dark-colored urine, or pale-colored stools.    Laboratory results revealed unremarkable CBC, CMP, PT/INR November 8, 2024.  FibroSure with no ALLEN, no steatosis, and no fibrosis November 8, 2024.  Abdominal ultrasound ordered but not completed.    Today, he presents for a follow-up visit.  He reports feeling well and denies any problems noted at this time.  He takes omeprazole 40 mg daily and Linzess 145 mcg as needed.  He typically has 1-2 formed stools daily and feels completely evacuated with defecation.  He denies frequent straining or sitting on the toilet for long periods of time in order to have a bowel movement.  He denies nocturnal symptoms, appetite changes, unintentional weight loss, fever, chills, nausea, vomiting, hematemesis, odynophagia, dysphagia, acid reflux, pyrosis, belching, bloating, early satiety, or abdominal pain.  He denies melena, hematochezia, fecal urgency, fecal incontinence, or pain with defecation.  He denies icterus, jaundice, pruritus, confusion, headaches, vision changes, cough, shortness of breath, chest pain, abdominal/lower extremity swelling, dark-colored urine, or pale-colored stools.      FOBT was positive November 27, 2022.  He denies ever having an EGD done.  He denies regular NSAID use or use of blood thinners.  He denies tobacco or alcohol use.  He denies illicit drug use.  He denies a family history of IBD, colon polyps, or colon cancer.    Review of patient's allergies indicates:  No Known Allergies    Past Medical History:   Diagnosis Date    Chest pain     Mediastinal lymphadenopathy 11/16/2022    Onychomycosis 11/16/2022    Pulmonary nodules 11/02/2022    Type 2 diabetes mellitus without complications      Past Surgical History:    Procedure Laterality Date    COLONOSCOPY N/A 9/28/2023    Procedure: COLONOSCOPY;  Surgeon: Galindo, David QUAN MD;  Location: [facility] ENDOSCOPY;  Service: Endoscopy;  Laterality: N/A;     Family History:   family history includes Diabetes Mellitus in his mother; No Known Problems in his father.    Social History:    reports that he has never smoked. He has never used smokeless tobacco. He reports that he does not currently use alcohol after a past usage of about 3.0 standard drinks of alcohol per week. He reports that he does not use drugs.    Review of Systems  Negative except as noted in the HPI.      Objective:      Physical Exam  Constitutional:       Appearance: Normal appearance.   HENT:      Head: Normocephalic.      Mouth/Throat:      Mouth: Mucous membranes are moist.   Eyes:      Extraocular Movements: Extraocular movements intact.      Conjunctiva/sclera: Conjunctivae normal.      Pupils: Pupils are equal, round, and reactive to light.   Cardiovascular:      Rate and Rhythm: Normal rate and regular rhythm.      Pulses: Normal pulses.      Heart sounds: Normal heart sounds.   Pulmonary:      Effort: Pulmonary effort is normal.      Breath sounds: Normal breath sounds.   Abdominal:      General: Bowel sounds are normal.      Palpations: Abdomen is soft.   Musculoskeletal:         General: Normal range of motion.      Cervical back: Normal range of motion and neck supple.   Skin:     General: Skin is warm and dry.   Neurological:      General: No focal deficit present.      Mental Status: He is alert and oriented to person, place, and time.   Psychiatric:         Mood and Affect: Mood normal.         Behavior: Behavior normal.         Thought Content: Thought content normal.         Judgment: Judgment normal.         Home Medications:     Current Outpatient Medications   Medication Sig    fluticasone-salmeterol diskus inhaler 100-50 mcg Inhale 1 puff into the lungs 2 (two) times daily. Controller     "liraglutide 0.6 mg/0.1 mL, 18 mg/3 mL, subq PNIJ (VICTOZA 2-LOIS) 0.6 mg/0.1 mL (18 mg/3 mL) PnIj pen Inject 0.6 mg into the skin once daily.    omeprazole (PRILOSEC) 40 MG capsule Take 1 capsule (40 mg total) by mouth once daily.    semaglutide (OZEMPIC) 0.25 mg or 0.5 mg (2 mg/3 mL) pen injector Inject 0.5 mg into the skin every 7 days.    TECHLITE PEN NEEDLE 32 gauge x 5/32" Ndle Use once daily for Victoza injection    terbinafine HCL (LAMISIL) 1 % cream Apply topically nightly.    albuterol (PROVENTIL HFA) 90 mcg/actuation inhaler Inhale 2 puffs into the lungs every 6 (six) hours as needed for Wheezing. Rescue (Patient not taking: Reported on 5/15/2025)    blood sugar diagnostic Strp To check BG once daily, to use with insurance preferred meter (Patient not taking: Reported on 5/15/2025)    blood-glucose meter kit To check BG once daily, to use with insurance preferred meter (Patient not taking: Reported on 3/2/2023)    lancets Misc To check BG once daily, to use with insurance preferred meter (Patient not taking: Reported on 3/2/2023)    linaCLOtide (LINZESS) 145 mcg Cap capsule Take 1 capsule (145 mcg total) by mouth before breakfast. (Patient not taking: Reported on 5/15/2025)     Current Facility-Administered Medications   Medication Frequency    albuterol sulfate nebulizer solution 2.5 mg Q4H PRN     Laboratory Results:     Recent Results (from the past 40 weeks)   Comprehensive Metabolic Panel    Collection Time: 11/08/24  9:05 AM   Result Value Ref Range    Sodium 138 136 - 145 mmol/L    Potassium 4.2 3.5 - 5.1 mmol/L    Chloride 102 98 - 107 mmol/L    CO2 30 (H) 22 - 29 mmol/L    Glucose 102 (H) 74 - 100 mg/dL    Blood Urea Nitrogen 9.6 8.9 - 20.6 mg/dL    Creatinine 0.67 (L) 0.72 - 1.25 mg/dL    Calcium 8.9 8.4 - 10.2 mg/dL    Protein Total 7.3 6.4 - 8.3 gm/dL    Albumin 3.7 3.5 - 5.0 g/dL    Globulin 3.6 (H) 2.4 - 3.5 gm/dL    Albumin/Globulin Ratio 1.0 (L) 1.1 - 2.0 ratio    Bilirubin Total 0.6 <=1.5 " mg/dL     40 - 150 unit/L    ALT 17 0 - 55 unit/L    AST 17 5 - 34 unit/L    eGFR >60 mL/min/1.73/m2    Anion Gap 6.0 mEq/L    BUN/Creatinine Ratio 14    Protime-INR    Collection Time: 11/08/24  9:05 AM   Result Value Ref Range    PT 13.6 11.4 - 14.0 seconds    INR 1.0 <=1.3   ALLEN Fibrosure    Collection Time: 11/08/24  9:05 AM   Result Value Ref Range    NashTest 2 Score 0.00     NashTest 2 Grade N0     NashTest 2 Interpretation no ALLEN     SteatoTest 2 Score 0.27     SteatoTest 2 Grade S0     SteatoTest 2 Interpretation no steatosis (<5%)     ALLEN-FibroTest Comment SEE COMMENTS     Aspartate Aminotransferase (AST), S 20 8 - 48 U/L    Cholesterol, Total, S 136 mg/dL    Triglycerides, S 140 mg/dL    Glucose, Fasting, P 103 (H) 70 - 100 mg/dL    FibroTest Score 0.15     FibroTest Stage F0     FibroTest Interpretation no fibrosis     BioPredictive Serial Number [id number]     Apolipoprotein A1 109 (L) >=120 mg/dL    Alpha-2-Macroglobulin 203 100 - 280 mg/dL    Haptoglobin 174 30 - 200 mg/dL    Alanine Aminotransferase (ALT) 21 7 - 55 U/L    Gamma Glutamyltransferase (GGT) 12 8 - 61 U/L    Bilirubin, Total 0.5 0.0 - 1.2 mg/dL   CBC with Differential    Collection Time: 11/08/24  9:05 AM   Result Value Ref Range    WBC 7.23 4.50 - 11.50 x10(3)/mcL    RBC 4.95 4.70 - 6.10 x10(6)/mcL    Hgb 14.0 14.0 - 18.0 g/dL    Hct 43.4 42.0 - 52.0 %    MCV 87.7 80.0 - 94.0 fL    MCH 28.3 27.0 - 31.0 pg    MCHC 32.3 (L) 33.0 - 36.0 g/dL    RDW 14.2 11.5 - 17.0 %    Platelet 223 130 - 400 x10(3)/mcL    MPV 11.1 (H) 7.4 - 10.4 fL    Neut % 64.7 %    Lymph % 21.9 %    Mono % 10.4 %    Eos % 2.4 %    Basophil % 0.3 %    Lymph # 1.58 0.6 - 4.6 x10(3)/mcL    Neut # 4.69 2.1 - 9.2 x10(3)/mcL    Mono # 0.75 0.1 - 1.3 x10(3)/mcL    Eos # 0.17 0 - 0.9 x10(3)/mcL    Baso # 0.02 <=0.2 x10(3)/mcL    Imm Gran # 0.02 0 - 0.04 x10(3)/mcL    Imm Grans % 0.3 %    NRBC% 0.0 %   POCT HEMOGLOBIN A1C    Collection Time: 11/20/24 11:37 AM   Result  Value Ref Range    Hemoglobin A1C, POC 6.2 %     Assessment/Plan:     Problem List Items Addressed This Visit          GI    Hepatic steatosis - Primary    Abdominal ultrasound December 6, 2023 revealed hepatic enlargement with steatosis, probable gallstones.  LFTs normal May 20, 2024  Laboratory results revealed unremarkable CBC, CMP, PT/INR November 8, 2024.    FibroSure with no ALLEN, no steatosis, and no fibrosis November 8, 2024.    Abdominal ultrasound ordered but not completed.  Lifestyle and dietary modifications provided  Recommend good control of comorbidities  Recommend weight loss   CBC, CMP, PT/INR, FibroSURE   Abdominal ultrasound  Call with updates   Follow-up clinic visit with NP in 6 months         Relevant Orders    CBC Auto Differential    Comprehensive Metabolic Panel    Protime-INR    US Abdomen Limited    ALLEN Fibrosure    Hematochezia    Resolved  He underwent colonoscopy September 28, 2023 with findings of diverticulosis in the sigmoid colon, exam otherwise normal with no specimens collected and a recommended recall of 10 years.         Chronic idiopathic constipation    He underwent colonoscopy September 28, 2023 with findings of diverticulosis in the sigmoid colon, exam otherwise normal with no specimens collected and a recommended recall of 10 years.  Recommend soluble fiber supplementation  Avoid straining or sitting on the toilet for long periods of time  Continue Linzess 145 mcg as needed

## 2025-05-15 NOTE — PROGRESS NOTES
Please notify CBC with mild drop in hemoglobin from 14.0 to 13.6 over the past 6 months.  Please see if lab can add iron profile and ferritin to lab work completed today.  Orders placed.  PT/INR and CMP okay.  Thanks

## 2025-05-15 NOTE — ASSESSMENT & PLAN NOTE
Abdominal ultrasound December 6, 2023 revealed hepatic enlargement with steatosis, probable gallstones.  LFTs normal May 20, 2024  Laboratory results revealed unremarkable CBC, CMP, PT/INR November 8, 2024.    FibroSure with no ALLEN, no steatosis, and no fibrosis November 8, 2024.    Abdominal ultrasound ordered but not completed.  Lifestyle and dietary modifications provided  Recommend good control of comorbidities  Recommend weight loss   CBC, CMP, PT/INR, FibroSURE   Abdominal ultrasound  Call with updates   Follow-up clinic visit with NP in 6 months

## 2025-05-15 NOTE — ASSESSMENT & PLAN NOTE
He underwent colonoscopy September 28, 2023 with findings of diverticulosis in the sigmoid colon, exam otherwise normal with no specimens collected and a recommended recall of 10 years.  Recommend soluble fiber supplementation  Avoid straining or sitting on the toilet for long periods of time  Continue Linzess 145 mcg as needed

## 2025-05-16 DIAGNOSIS — D50.9 IRON DEFICIENCY ANEMIA, UNSPECIFIED IRON DEFICIENCY ANEMIA TYPE: Primary | ICD-10-CM

## 2025-05-16 LAB
FERRITIN SERPL-MCNC: 11.51 NG/ML (ref 21.81–274.66)
IRON SATN MFR SERPL: 11 % (ref 20–50)
IRON SERPL-MCNC: 46 UG/DL (ref 65–175)
TIBC SERPL-MCNC: 358 UG/DL (ref 60–240)
TIBC SERPL-MCNC: 404 UG/DL (ref 250–450)
TRANSFERRIN SERPL-MCNC: 386 MG/DL (ref 174–364)

## 2025-05-16 RX ORDER — EPINEPHRINE 0.3 MG/.3ML
0.3 INJECTION SUBCUTANEOUS ONCE AS NEEDED
OUTPATIENT
Start: 2025-05-16

## 2025-05-16 RX ORDER — SODIUM CHLORIDE 0.9 % (FLUSH) 0.9 %
10 SYRINGE (ML) INJECTION
OUTPATIENT
Start: 2025-05-16

## 2025-05-16 RX ORDER — HEPARIN 100 UNIT/ML
500 SYRINGE INTRAVENOUS
OUTPATIENT
Start: 2025-05-16

## 2025-05-16 RX ORDER — SODIUM FERRIC GLUCONATE COMPLEX IN SUCROSE 12.5 MG/ML
125 INJECTION INTRAVENOUS
OUTPATIENT
Start: 2025-05-16

## 2025-05-16 NOTE — PROGRESS NOTES
Please notify patient that he is deficient in iron.  I have ordered iron infusions.  Please assure these get scheduled.  I have also ordered EGD to further evaluate GI tract.  I did review colonoscopy report from 2023 as well.  Please provide ER precautions in the interim for any overt GI bleeding.  Thanks

## 2025-05-19 LAB
A2 MACROGLOB SERPL-MCNC: 167 MG/DL (ref 100–280)
ALT SERPL W P-5'-P-CCNC: 29 U/L (ref 7–55)
ANNOTATION COMMENT IMP: ABNORMAL
APO A-I SERPL-MCNC: 115 MG/DL
AST SERPL W P-5'-P-CCNC: 20 U/L (ref 8–48)
BILIRUB SERPL-MCNC: 0.4 MG/DL (ref 0–1.2)
CHOLEST SERPL-MCNC: 135 MG/DL
FIBROSIS STAGE SERPL QL: ABNORMAL
GGT SERPL-CCNC: 17 U/L (ref 8–61)
GLUCOSE P FAST SERPL-MCNC: 96 MG/DL (ref 70–100)
HAPTOGLOB SERPL NEPH-MCNC: 174 MG/DL (ref 30–200)
LIVER FIBR SCORE SERPL CALC.FIBROSURE: 0.1
LIVER FIBROSIS INTERPRETATION SER-IMP: ABNORMAL
LIVER STEATOSIS GRADE SERPL QL: ABNORMAL
LIVER STEATOSIS INTERP SERPL-IMP: ABNORMAL
LIVER STEATOSIS SCORE SERPL: 0.35
NASH GRADE SERPL QL: ABNORMAL
NASH INTERPRETATION SERPL-IMP: ABNORMAL
NASH SCORE SERPL: 0
SERIAL #: ABNORMAL
TRIGL SERPL-MCNC: 145 MG/DL

## 2025-05-20 ENCOUNTER — OFFICE VISIT (OUTPATIENT)
Dept: INTERNAL MEDICINE | Facility: CLINIC | Age: 35
End: 2025-05-20

## 2025-05-20 VITALS
OXYGEN SATURATION: 99 % | DIASTOLIC BLOOD PRESSURE: 76 MMHG | HEIGHT: 62 IN | WEIGHT: 315 LBS | BODY MASS INDEX: 57.97 KG/M2 | TEMPERATURE: 98 F | RESPIRATION RATE: 20 BRPM | HEART RATE: 77 BPM | SYSTOLIC BLOOD PRESSURE: 116 MMHG

## 2025-05-20 DIAGNOSIS — K76.0 HEPATIC STEATOSIS: ICD-10-CM

## 2025-05-20 DIAGNOSIS — E11.9 TYPE 2 DIABETES MELLITUS WITHOUT COMPLICATION, WITHOUT LONG-TERM CURRENT USE OF INSULIN: Primary | ICD-10-CM

## 2025-05-20 DIAGNOSIS — E55.9 VITAMIN D DEFICIENCY: ICD-10-CM

## 2025-05-20 DIAGNOSIS — E66.2 CLASS 3 OBESITY WITH ALVEOLAR HYPOVENTILATION, SERIOUS COMORBIDITY, AND BODY MASS INDEX (BMI) OF 60.0 TO 69.9 IN ADULT: ICD-10-CM

## 2025-05-20 DIAGNOSIS — E66.813 CLASS 3 OBESITY WITH ALVEOLAR HYPOVENTILATION, SERIOUS COMORBIDITY, AND BODY MASS INDEX (BMI) OF 60.0 TO 69.9 IN ADULT: ICD-10-CM

## 2025-05-20 PROCEDURE — 99215 OFFICE O/P EST HI 40 MIN: CPT | Mod: PBBFAC | Performed by: NURSE PRACTITIONER

## 2025-05-20 PROCEDURE — 99214 OFFICE O/P EST MOD 30 MIN: CPT | Mod: S$PBB,,, | Performed by: NURSE PRACTITIONER

## 2025-05-20 RX ORDER — CHOLECALCIFEROL (VITAMIN D3) 1250 MCG
1250 TABLET ORAL
Qty: 12 TABLET | Refills: 0 | Status: SHIPPED | OUTPATIENT
Start: 2025-05-20

## 2025-05-20 RX ORDER — LIRAGLUTIDE 6 MG/ML
1.2 INJECTION SUBCUTANEOUS DAILY
Qty: 3 ML | Refills: 6 | Status: SHIPPED | OUTPATIENT
Start: 2025-05-20 | End: 2026-05-20

## 2025-05-20 NOTE — ASSESSMENT & PLAN NOTE
Lab Results   Component Value Date    HGBA1C 6.3 05/15/2025     CBGs: not checking   Hypoglycemia episodes: denies symptoms though reports weakness x 1 week ago   Microalbumin:  Lab Results   Component Value Date    MICALBCREAT  05/15/2025      Comment:      UNABLE TO GALDINO       Educated on ADA diet: eliminate/decrease high carbohydrate foods (rice, pasta, bread, potatoes (french fries, chips), candy, sweets, fruit juices, canned fruit, junk food, crackers, carbonated beverages)  Educated on health benefits of at least 5 days/ week of 30 minutes moderate intensity exercise (brisk walking) and 2 or more days/ week of muscle strength activities  Avoid alcohol or tobacco use  Eye exam: 11/20/23 no  Encouraged to f/u with [facility] eye for r/s appt  Foot exam: 11/20/24  Increase Victoza to 1.2 mg ; unsure if can afford ozempic.   Encouraged CBG checks

## 2025-05-20 NOTE — ASSESSMENT & PLAN NOTE
Pt evaluated by [facility] GI. Low fat/ chol diet. Limit Tylenol 2 g/d. Avoid alcohol. Regular exercise as tolerated. Control of co-morbidities such as HTN, HLD, DM if applicable. US yearly for surveillance.

## 2025-05-20 NOTE — PROGRESS NOTES
Sindi L Alexus, NP   [facility]  [address] [facility] [address]      PATIENT NAME: Tawanda Izaguirre  : 1990  DATE: 25  MRN: [id number]        History of Present Illness / Problem Focused Workflow     Tawanda Izaguirre presents to the clinic with Follow-up, Diabetes, and Results (Felt weak last week)     36 yo male for routine 6 mo follow up/ lab review. Last seen 24. PMH includes atypical CP (stress test negative for ischemia), pulmonary nodules and mediastinal lymphadenopathy (resolved). Active diagnosis includes DM, obesity hypoventilation syndrome, on home oxygen, hepatic steatosis, GERD, hemorrhoids/ rectal bleeding. He reports an episode of weakness last week. Denies checking CBG; he states he was not home to be able to check. States he was eating and drinking normally. /76. Labs reviewed. A1c 6.3%. He states he was out of Victoza for a while when he went out of town.  He states Victoza is affordable for him. Vit D 18. Wt 368#; gain 18# since 2024. He states he is not doing the work he was doing before; not moving as much. Unsure if can afford ozempic at this time. Wants to adjust dose of Victoza. Denies any CP, SOB, HA, dizziness.     Other providers:   [facility] Cardiology- 3/7/23- f/u prn   [facility] GI - colonoscopy completed 2023  [facility] Ophthalmology- missed visit 10/17/24    Review of Systems     Review of Systems   Constitutional: Negative.    HENT: Negative.     Eyes: Negative.    Respiratory: Negative.     Cardiovascular: Negative.    Gastrointestinal: Negative.    Endocrine: Negative.    Genitourinary: Negative.    Musculoskeletal: Negative.    Skin: Negative.    Allergic/Immunologic: Negative.    Neurological: Negative.    Hematological: Negative.    Psychiatric/Behavioral: Negative.         Medical / Social / Family History     -------------------------------------    Chest pain    Mediastinal  "lymphadenopathy    Onychomycosis    Pulmonary nodules    Type 2 diabetes mellitus without complications        Past Surgical History:   Procedure Laterality Date    COLONOSCOPY N/A 9/28/2023    Procedure: COLONOSCOPY;  Surgeon: Galindo, David QUAN MD;  Location: [facility] ENDOSCOPY;  Service: Endoscopy;  Laterality: N/A;       Social History[1]     Family History   Problem Relation Name Age of Onset    Diabetes Mellitus Mother      No Known Problems Father          Medications and Allergies     Medications  Current Outpatient Medications   Medication Instructions    albuterol (PROVENTIL HFA) 90 mcg/actuation inhaler 2 puffs, Inhalation, Every 6 hours PRN, Rescue    blood sugar diagnostic Strp To check BG once daily, to use with insurance preferred meter    blood-glucose meter kit To check BG once daily, to use with insurance preferred meter    cholecalciferol (vitamin D3) 1,250 mcg, Oral, Every 7 days    fluticasone-salmeterol diskus inhaler 100-50 mcg 1 puff, Inhalation, 2 times daily, Controller    lancets Misc To check BG once daily, to use with insurance preferred meter    linaCLOtide (LINZESS) 145 mcg, Oral, Before breakfast    liraglutide 0.6 mg/0.1 mL (18 mg/3 mL) subq PNIJ (VICTOZA 2-LOIS) 1.2 mg, Subcutaneous, Daily    omeprazole (PRILOSEC) 40 mg, Oral, Daily    TECHLITE PEN NEEDLE 32 gauge x 5/32" Ndle Use once daily for Victoza injection    terbinafine HCL (LAMISIL) 1 % cream Topical (Top), Nightly       Allergies  Review of patient's allergies indicates:  No Known Allergies    Physical Examination     Visit Vitals  /76 (BP Location: Left arm, Patient Position: Sitting)   Pulse 77   Temp 98.3 °F (36.8 °C) (Oral)   Resp 20   Ht 5' 2.01" (1.575 m)   Wt (!) 167 kg (368 lb 3.2 oz)   SpO2 99%   BMI 67.33 kg/m²       Physical Exam  Constitutional:       General: He is not in acute distress.     Appearance: Normal appearance. He is obese. He is not ill-appearing, toxic-appearing or diaphoretic.   HENT:      " Head: Normocephalic.   Cardiovascular:      Rate and Rhythm: Normal rate and regular rhythm.   Pulmonary:      Effort: Pulmonary effort is normal. No respiratory distress.      Breath sounds: Normal breath sounds.   Skin:     General: Skin is warm and dry.   Neurological:      General: No focal deficit present.      Mental Status: He is alert and oriented to person, place, and time. Mental status is at baseline.      Motor: No weakness.      Coordination: Coordination normal.      Gait: Gait normal.   Psychiatric:         Mood and Affect: Mood normal.         Behavior: Behavior normal.         Thought Content: Thought content normal.         Judgment: Judgment normal.           Results     Lab Results   Component Value Date    WBC 6.81 05/15/2025    RBC 5.14 05/15/2025    HGB 13.6 (L) 05/15/2025    HCT 43.7 05/15/2025    MCV 85.0 05/15/2025    MCH 26.5 (L) 05/15/2025    MCHC 31.1 (L) 05/15/2025    RDW 15.4 05/15/2025     05/15/2025    MPV 10.9 (H) 05/15/2025     Sodium   Date Value Ref Range Status   05/15/2025 137 136 - 145 mmol/L Final     Potassium   Date Value Ref Range Status   05/15/2025 4.3 3.5 - 5.1 mmol/L Final     Chloride   Date Value Ref Range Status   05/15/2025 99 98 - 107 mmol/L Final     CO2   Date Value Ref Range Status   05/15/2025 31 (H) 22 - 29 mmol/L Final     Glucose   Date Value Ref Range Status   05/15/2025 95 74 - 100 mg/dL Final     Blood Urea Nitrogen   Date Value Ref Range Status   05/15/2025 9.5 8.9 - 20.6 mg/dL Final     Creatinine   Date Value Ref Range Status   05/15/2025 0.68 (L) 0.72 - 1.25 mg/dL Final     Calcium   Date Value Ref Range Status   05/15/2025 9.0 8.4 - 10.2 mg/dL Final     Protein Total   Date Value Ref Range Status   05/15/2025 7.6 6.4 - 8.3 gm/dL Final     Albumin   Date Value Ref Range Status   05/15/2025 3.6 3.5 - 5.0 g/dL Final     Bilirubin Total   Date Value Ref Range Status   05/15/2025 0.7 <=1.5 mg/dL Final     ALP   Date Value Ref Range Status    05/15/2025 123 40 - 150 unit/L Final     AST   Date Value Ref Range Status   05/15/2025 17 11 - 45 unit/L Final     ALT   Date Value Ref Range Status   05/15/2025 27 0 - 55 unit/L Final     Estimated GFR-Non    Date Value Ref Range Status   08/02/2022 >60 mls/min/1.73/m2 Final     Lab Results   Component Value Date    CHOL 119 05/20/2024     Lab Results   Component Value Date    HDL 31 (L) 05/20/2024     Lab Results   Component Value Date    TRIG 121 05/20/2024     Lab Results   Component Value Date    LDL 64.00 05/20/2024     Lab Results   Component Value Date    TSH 1.981 05/15/2025     Lab Results   Component Value Date    PHUR 6.0 07/29/2022    PROTEINUA Trace (A) 07/29/2022    GLUCUA Normal 07/29/2022    OCCULTUA Negative 07/29/2022    NITRITE Negative 07/29/2022    LEUKOCYTESUR Negative 07/29/2022     Lab Results   Component Value Date    HGBA1C 6.3 05/15/2025    HGBA1C 6.0 05/20/2024    HGBA1C 5.9 11/20/2023     Lab Results   Component Value Date    MICALBCREAT  05/15/2025      Comment:      UNABLE TO GALDINO          Assessment       ICD-10-CM ICD-9-CM   1. Type 2 diabetes mellitus without complication, without long-term current use of insulin  E11.9 250.00   2. Hepatic steatosis  K76.0 571.8   3. Class 3 obesity with alveolar hypoventilation, serious comorbidity, and body mass index (BMI) of 60.0 to 69.9 in adult  E66.813 278.03    E66.2 V85.44    Z68.44    4. Vitamin D deficiency  E55.9 268.9       Plan       Problem List Items Addressed This Visit          Endocrine    Class 3 severe obesity with serious comorbidity and body mass index (BMI) of 60.0 to 69.9 in adult    Current Assessment & Plan   BMI 67.33, wt gain 18# since last visit November 2024  See DM; on victoza  Educated on increased risk of disease s/t obesity.  Educated on health benefits of at least 5 days/ week of 30 minutes moderate intensity exercise (brisk walking) and 2 or more days/ week of muscle strength activities (as  tolerated).  Eat well balanced diet of fresh fruits/ vegetables, whole grains, lean meats and limit high carbohydrate foods.            Relevant Medications    liraglutide 0.6 mg/0.1 mL, 18 mg/3 mL, subq PNIJ (VICTOZA 2-LOIS) 0.6 mg/0.1 mL (18 mg/3 mL) PnIj pen    Other Relevant Orders    Comprehensive Metabolic Panel    CBC Auto Differential    Lipid Panel    Hemoglobin A1C    Microalbumin/Creatinine Ratio, Urine    Vitamin D    Type 2 diabetes mellitus without complication, without long-term current use of insulin - Primary    Current Assessment & Plan   Lab Results   Component Value Date    HGBA1C 6.3 05/15/2025     CBGs: not checking   Hypoglycemia episodes: denies symptoms though reports weakness x 1 week ago   Microalbumin:  Lab Results   Component Value Date    MICALBCREAT  05/15/2025      Comment:      UNABLE TO GALDINO       Educated on ADA diet: eliminate/decrease high carbohydrate foods (rice, pasta, bread, potatoes (french fries, chips), candy, sweets, fruit juices, canned fruit, junk food, crackers, carbonated beverages)  Educated on health benefits of at least 5 days/ week of 30 minutes moderate intensity exercise (brisk walking) and 2 or more days/ week of muscle strength activities  Avoid alcohol or tobacco use  Eye exam: 11/20/23 no  Encouraged to f/u with [facility] eye for r/s appt  Foot exam: 11/20/24  Increase Victoza to 1.2 mg ; unsure if can afford ozempic.   Encouraged CBG checks          Relevant Medications    liraglutide 0.6 mg/0.1 mL, 18 mg/3 mL, subq PNIJ (VICTOZA 2-LOIS) 0.6 mg/0.1 mL (18 mg/3 mL) PnIj pen    Other Relevant Orders    Comprehensive Metabolic Panel    CBC Auto Differential    Lipid Panel    Hemoglobin A1C    Microalbumin/Creatinine Ratio, Urine    Vitamin D    Vitamin D deficiency    Current Assessment & Plan   Lab Results   Component Value Date    [id number] 18 (L) 05/15/2025   Rx [id number] IU once weekly x 12 weeks   Educated on increasing foods high in Vitamin D such as mushrooms,  fish oil, cod liver, salmon, tuna, egg yolks, milk fortified with Vitamin D and foods fortified with Vitamin D such as cereals and oatmeal.  Daily supplementation of Vitamin D 2000 IU daily in addition to Calcium supplementation 1000 mg- 1200 mg daily.           Relevant Medications    cholecalciferol, vitamin D3, 1,250 mcg (50,000 unit) Tab    Other Relevant Orders    Vitamin D       GI    Hepatic steatosis    Current Assessment & Plan   Pt evaluated by [facility] GI. Low fat/ chol diet. Limit Tylenol 2 g/d. Avoid alcohol. Regular exercise as tolerated. Control of co-morbidities such as HTN, HLD, DM if applicable.  yearly for surveillance.           Relevant Orders    Comprehensive Metabolic Panel    CBC Auto Differential    Lipid Panel    Hemoglobin A1C    Microalbumin/Creatinine Ratio, Urine    Vitamin D       Future Appointments   Date Time Provider Department Center   5/22/2025  8:00 AM [address]   11/20/2025  9:20 AM Sindi Vaughan NP [facility]   12/29/2025 10:00 AM Whit Freidman FNP [facility]        Follow up in about 6 months (around 11/20/2025) for DM with fasting labs before visit .    Signature:  Sindi Vaughan NP  [facility]  [address] [facility] [address]    Date of encounter: 5/20/25         [1]   Social History  Socioeconomic History    Marital status: Single   Tobacco Use    Smoking status: Never    Smokeless tobacco: Never   Substance and Sexual Activity    Alcohol use: Not Currently     Alcohol/week: 3.0 standard drinks of alcohol     Types: 3 Cans of beer per week     Comment: quit drinking 5 years ago    Drug use: Never    Sexual activity: Not Currently     Partners: Female     Social Drivers of Health     Financial Resource Strain: High Risk (11/20/2024)    Overall Financial Resource Strain (CARDIA)     Difficulty of Paying Living Expenses: Very hard   Food Insecurity: No Food  Insecurity (11/20/2024)    Hunger Vital Sign     Worried About Running Out of Food in the Last Year: Never true     Ran Out of Food in the Last Year: Never true   Transportation Needs: No Transportation Needs (11/20/2024)    TRANSPORTATION NEEDS     Transportation : No   Physical Activity: Inactive (11/2/2022)    Exercise Vital Sign     Days of Exercise per Week: 0 days     Minutes of Exercise per Session: 0 min   Housing Stability: Unknown (11/20/2024)    Housing Stability Vital Sign     Unable to Pay for Housing in the Last Year: No     Homeless in the Last Year: No

## 2025-05-20 NOTE — ASSESSMENT & PLAN NOTE
BMI 67.33, wt gain 18# since last visit November 2024  See DM; on victoza  Educated on increased risk of disease s/t obesity.  Educated on health benefits of at least 5 days/ week of 30 minutes moderate intensity exercise (brisk walking) and 2 or more days/ week of muscle strength activities (as tolerated).  Eat well balanced diet of fresh fruits/ vegetables, whole grains, lean meats and limit high carbohydrate foods.

## 2025-05-20 NOTE — ASSESSMENT & PLAN NOTE
Lab Results   Component Value Date    [id number] 18 (L) 05/15/2025   Rx [id number] IU once weekly x 12 weeks   Educated on increasing foods high in Vitamin D such as mushrooms, fish oil, cod liver, salmon, tuna, egg yolks, milk fortified with Vitamin D and foods fortified with Vitamin D such as cereals and oatmeal.  Daily supplementation of Vitamin D 2000 IU daily in addition to Calcium supplementation 1000 mg- 1200 mg daily.

## 2025-05-22 ENCOUNTER — HOSPITAL ENCOUNTER (OUTPATIENT)
Dept: RADIOLOGY | Facility: HOSPITAL | Age: 35
Discharge: HOME | End: 2025-05-22
Attending: NURSE PRACTITIONER

## 2025-05-22 DIAGNOSIS — K76.0 HEPATIC STEATOSIS: ICD-10-CM

## 2025-05-22 PROCEDURE — 76705 ECHO EXAM OF ABDOMEN: CPT | Mod: TC

## (undated) DEVICE — MANIFOLD 4 PORT

## (undated) DEVICE — SOL IRRI STRL WATER 1000ML

## (undated) DEVICE — KIT SURGICAL COLON .25 1.1OZ